# Patient Record
Sex: FEMALE | Race: WHITE | Employment: PART TIME
[De-identification: names, ages, dates, MRNs, and addresses within clinical notes are randomized per-mention and may not be internally consistent; named-entity substitution may affect disease eponyms.]

---

## 2017-01-26 ENCOUNTER — OFFICE VISIT (OUTPATIENT)
Dept: OBGYN | Facility: CLINIC | Age: 37
End: 2017-01-26

## 2017-01-26 VITALS
SYSTOLIC BLOOD PRESSURE: 135 MMHG | WEIGHT: 142 LBS | HEIGHT: 64 IN | HEART RATE: 90 BPM | BODY MASS INDEX: 24.24 KG/M2 | DIASTOLIC BLOOD PRESSURE: 87 MMHG

## 2017-01-26 DIAGNOSIS — N91.2 AMENORRHEA: Primary | ICD-10-CM

## 2017-01-26 DIAGNOSIS — Z32.01 POSITIVE PREGNANCY TEST: ICD-10-CM

## 2017-01-26 LAB
CONTROL: ABNORMAL
PREGNANCY TEST URINE, POC: ABNORMAL

## 2017-01-26 PROCEDURE — 81025 URINE PREGNANCY TEST: CPT | Performed by: SPECIALIST

## 2017-01-26 PROCEDURE — 99213 OFFICE O/P EST LOW 20 MIN: CPT | Performed by: SPECIALIST

## 2017-01-26 RX ORDER — PROMETHAZINE HYDROCHLORIDE 25 MG/1
25 TABLET ORAL EVERY 6 HOURS PRN
Qty: 30 TABLET | Refills: 1 | Status: ON HOLD | OUTPATIENT
Start: 2017-01-26 | End: 2017-07-27 | Stop reason: HOSPADM

## 2017-01-26 RX ORDER — VITAMIN A ACETATE, .BETA.-CAROTENE, ASCORBIC ACID, CHOLECALCIFEROL, .ALPHA.-TOCOPHEROL ACETATE, DL-, THIAMINE MONONITRATE, RIBOFLAVIN, NIACINAMIDE, PYRIDOXINE HYDROCHLORIDE, FOLIC ACID, CYANOCOBALAMIN, CALCIUM CARBONATE, FERROUS FUMARATE, ZINC OXIDE, AND CUPRIC OXIDE 2000; 2000; 120; 400; 22; 1.84; 3; 20; 10; 1; 12; 200; 27; 25; 2 [IU]/1; [IU]/1; MG/1; [IU]/1; MG/1; MG/1; MG/1; MG/1; MG/1; MG/1; UG/1; MG/1; MG/1; MG/1; MG/1
1 TABLET ORAL DAILY
Qty: 30 TABLET | Refills: 11 | Status: SHIPPED | OUTPATIENT
Start: 2017-01-26 | End: 2018-09-05

## 2017-02-03 ENCOUNTER — PROCEDURE VISIT (OUTPATIENT)
Dept: OBGYN | Facility: CLINIC | Age: 37
End: 2017-02-03

## 2017-02-03 DIAGNOSIS — Z3A.14 14 WEEKS GESTATION OF PREGNANCY: ICD-10-CM

## 2017-02-03 DIAGNOSIS — O36.80X0 ENCOUNTER TO DETERMINE FETAL VIABILITY OF PREGNANCY, NOT APPLICABLE OR UNSPECIFIED FETUS: Primary | ICD-10-CM

## 2017-02-03 PROCEDURE — 76805 OB US >/= 14 WKS SNGL FETUS: CPT | Performed by: SPECIALIST

## 2017-02-03 ASSESSMENT — ENCOUNTER SYMPTOMS
APNEA: 0
ABDOMINAL DISTENTION: 0
CONSTIPATION: 0
ABDOMINAL PAIN: 0
VOMITING: 1
NAUSEA: 0
COUGH: 0
DIARRHEA: 0
EYE PAIN: 0

## 2017-02-21 ENCOUNTER — INITIAL PRENATAL (OUTPATIENT)
Dept: OBGYN | Facility: CLINIC | Age: 37
End: 2017-02-21

## 2017-02-21 ENCOUNTER — HOSPITAL ENCOUNTER (OUTPATIENT)
Age: 37
Setting detail: SPECIMEN
Discharge: HOME | End: 2017-02-21
Payer: COMMERCIAL

## 2017-02-21 VITALS
BODY MASS INDEX: 25.23 KG/M2 | WEIGHT: 147 LBS | DIASTOLIC BLOOD PRESSURE: 86 MMHG | HEART RATE: 94 BPM | SYSTOLIC BLOOD PRESSURE: 124 MMHG

## 2017-02-21 DIAGNOSIS — Z3A.16 16 WEEKS GESTATION OF PREGNANCY: ICD-10-CM

## 2017-02-21 DIAGNOSIS — Z34.80 SUPERVISION OF OTHER NORMAL PREGNANCY, ANTEPARTUM: Primary | ICD-10-CM

## 2017-02-21 PROCEDURE — 0502F SUBSEQUENT PRENATAL CARE: CPT | Performed by: SPECIALIST

## 2017-02-22 LAB
-: NORMAL
ABO/RH: NORMAL
ABSOLUTE EOS #: 0.1 K/UL (ref 0–0.4)
ABSOLUTE LYMPH #: 1.9 K/UL (ref 1–4.8)
ABSOLUTE MONO #: 0.4 K/UL (ref 0.1–1.2)
AMORPHOUS: NORMAL
AMPHETAMINE SCREEN URINE: NEGATIVE
ANTIBODY SCREEN: NEGATIVE
BACTERIA: NORMAL
BARBITURATE SCREEN URINE: NEGATIVE
BASOPHILS # BLD: 1 % (ref 0–2)
BASOPHILS ABSOLUTE: 0 K/UL (ref 0–0.2)
BENZODIAZEPINE SCREEN, URINE: NEGATIVE
BILIRUBIN URINE: NEGATIVE
BUPRENORPHINE URINE: ABNORMAL
CANNABINOID SCREEN URINE: POSITIVE
CASTS UA: NORMAL /LPF (ref 0–8)
COCAINE METABOLITE, URINE: NEGATIVE
COLOR: YELLOW
COMMENT UA: ABNORMAL
CRYSTALS, UA: NORMAL /HPF
DIFFERENTIAL TYPE: ABNORMAL
EOSINOPHILS RELATIVE PERCENT: 1 % (ref 1–4)
EPITHELIAL CELLS UA: NORMAL /HPF (ref 0–5)
GLUCOSE URINE: NEGATIVE
HCT VFR BLD CALC: 33.9 % (ref 36–46)
HEMOGLOBIN: 11.6 G/DL (ref 12–16)
HEPATITIS B SURFACE ANTIGEN: NONREACTIVE
HIV AG/AB: NONREACTIVE
KETONES, URINE: NEGATIVE
LEUKOCYTE ESTERASE, URINE: NEGATIVE
LYMPHOCYTES # BLD: 25 % (ref 24–44)
MCH RBC QN AUTO: 29.9 PG (ref 26–34)
MCHC RBC AUTO-ENTMCNC: 34.2 G/DL (ref 31–37)
MCV RBC AUTO: 87.5 FL (ref 80–100)
MDMA URINE: ABNORMAL
METHADONE SCREEN, URINE: NEGATIVE
METHAMPHETAMINE, URINE: ABNORMAL
MONOCYTES # BLD: 5 % (ref 2–11)
MUCUS: NORMAL
NITRITE, URINE: NEGATIVE
OPIATES, URINE: NEGATIVE
OTHER OBSERVATIONS UA: NORMAL
OXYCODONE SCREEN URINE: NEGATIVE
PDW BLD-RTO: 14.1 % (ref 12.5–15.4)
PH UA: 7 (ref 5–8)
PHENCYCLIDINE, URINE: NEGATIVE
PLATELET # BLD: 264 K/UL (ref 140–450)
PLATELET ESTIMATE: ABNORMAL
PMV BLD AUTO: 8.8 FL (ref 6–12)
PROPOXYPHENE, URINE: ABNORMAL
PROTEIN UA: NEGATIVE
RBC # BLD: 3.88 M/UL (ref 4–5.2)
RBC # BLD: ABNORMAL 10*6/UL
RBC UA: NORMAL /HPF (ref 0–4)
RENAL EPITHELIAL, UA: NORMAL /HPF
RUBV IGG SER QL: 10.8 IU/ML
SEG NEUTROPHILS: 68 % (ref 36–66)
SEGMENTED NEUTROPHILS ABSOLUTE COUNT: 5.2 K/UL (ref 1.8–7.7)
SICKLE CELL SCREEN: NEGATIVE
SPECIFIC GRAVITY UA: 1.02 (ref 1–1.03)
T. PALLIDUM, IGG: NONREACTIVE
TEST INFORMATION: ABNORMAL
TRICHOMONAS: NORMAL
TRICYCLIC ANTIDEPRESSANTS, UR: ABNORMAL
TSH SERPL DL<=0.05 MIU/L-ACNC: 3.05 MIU/L (ref 0.3–5)
TURBIDITY: ABNORMAL
URINE HGB: NEGATIVE
UROBILINOGEN, URINE: NORMAL
WBC # BLD: 7.6 K/UL (ref 3.5–11)
WBC # BLD: ABNORMAL 10*3/UL
WBC UA: NORMAL /HPF (ref 0–5)
YEAST: NORMAL

## 2017-03-01 LAB — CYSTIC FIBROSIS: NORMAL

## 2017-03-16 ENCOUNTER — HOSPITAL ENCOUNTER (OUTPATIENT)
Age: 37
Setting detail: SPECIMEN
Discharge: HOME | End: 2017-03-16

## 2017-03-16 ENCOUNTER — ROUTINE PRENATAL (OUTPATIENT)
Dept: OBGYN CLINIC | Age: 37
End: 2017-03-16

## 2017-03-16 VITALS
BODY MASS INDEX: 25.92 KG/M2 | WEIGHT: 151 LBS | HEART RATE: 98 BPM | DIASTOLIC BLOOD PRESSURE: 82 MMHG | SYSTOLIC BLOOD PRESSURE: 128 MMHG

## 2017-03-16 DIAGNOSIS — Z34.82 ENCOUNTER FOR SUPERVISION OF OTHER NORMAL PREGNANCY, SECOND TRIMESTER: ICD-10-CM

## 2017-03-16 DIAGNOSIS — N76.0 ACUTE VAGINITIS: ICD-10-CM

## 2017-03-16 DIAGNOSIS — Z12.4 CERVICAL CANCER SCREENING: Primary | ICD-10-CM

## 2017-03-16 PROCEDURE — 0502F SUBSEQUENT PRENATAL CARE: CPT | Performed by: SPECIALIST

## 2017-03-16 RX ORDER — FLUCONAZOLE 100 MG/1
100 TABLET ORAL DAILY
Qty: 7 TABLET | Refills: 0 | Status: SHIPPED | OUTPATIENT
Start: 2017-03-16 | End: 2017-03-23

## 2017-03-21 LAB
CHLAMYDIA BY THIN PREP: NEGATIVE
N. GONORRHOEAE DNA, THIN PREP: NEGATIVE

## 2017-03-22 LAB — CYTOLOGY REPORT: NORMAL

## 2017-03-24 ENCOUNTER — PROCEDURE VISIT (OUTPATIENT)
Dept: OBGYN CLINIC | Age: 37
End: 2017-03-24
Payer: COMMERCIAL

## 2017-03-24 DIAGNOSIS — Z3A.21 21 WEEKS GESTATION OF PREGNANCY: ICD-10-CM

## 2017-03-24 DIAGNOSIS — R93.89 ABNORMAL FINDING ON ULTRASOUND: ICD-10-CM

## 2017-03-24 DIAGNOSIS — Z36.89 ENCOUNTER FOR FETAL ANATOMIC SURVEY: Primary | ICD-10-CM

## 2017-03-24 PROCEDURE — 76805 OB US >/= 14 WKS SNGL FETUS: CPT | Performed by: SPECIALIST

## 2017-04-18 ENCOUNTER — ROUTINE PRENATAL (OUTPATIENT)
Dept: OBGYN CLINIC | Age: 37
End: 2017-04-18

## 2017-04-18 VITALS
BODY MASS INDEX: 26.26 KG/M2 | HEART RATE: 114 BPM | SYSTOLIC BLOOD PRESSURE: 121 MMHG | DIASTOLIC BLOOD PRESSURE: 76 MMHG | WEIGHT: 153 LBS

## 2017-04-18 DIAGNOSIS — Z34.82 ENCOUNTER FOR SUPERVISION OF OTHER NORMAL PREGNANCY, SECOND TRIMESTER: Primary | ICD-10-CM

## 2017-04-18 DIAGNOSIS — Z3A.25 25 WEEKS GESTATION OF PREGNANCY: ICD-10-CM

## 2017-04-18 PROCEDURE — 0502F SUBSEQUENT PRENATAL CARE: CPT | Performed by: SPECIALIST

## 2017-04-25 ENCOUNTER — HOSPITAL ENCOUNTER (OUTPATIENT)
Age: 37
Discharge: HOME | End: 2017-04-25
Payer: COMMERCIAL

## 2017-04-25 ENCOUNTER — ROUTINE PRENATAL (OUTPATIENT)
Dept: PERINATAL CARE | Age: 37
End: 2017-04-25
Payer: COMMERCIAL

## 2017-04-25 VITALS
BODY MASS INDEX: 27.12 KG/M2 | HEART RATE: 100 BPM | TEMPERATURE: 98.3 F | WEIGHT: 158 LBS | SYSTOLIC BLOOD PRESSURE: 130 MMHG | DIASTOLIC BLOOD PRESSURE: 84 MMHG | RESPIRATION RATE: 20 BRPM

## 2017-04-25 DIAGNOSIS — O35.DXX0 ECHOGENIC FOCUS OF BOWEL, FETAL, AFFECTING CARE OF MOTHER, ANTEPARTUM, NOT APPLICABLE OR UNSPECIFIED FETUS: Primary | ICD-10-CM

## 2017-04-25 DIAGNOSIS — Z3A.25 25 WEEKS GESTATION OF PREGNANCY: ICD-10-CM

## 2017-04-25 LAB
TOXOPLASM IGM: 0.15 INDEX
TOXOPLASMA BLOOD FOR RATIO: <0.5 IU/ML

## 2017-04-25 PROCEDURE — 36415 COLL VENOUS BLD VENIPUNCTURE: CPT

## 2017-04-25 PROCEDURE — 99242 OFF/OP CONSLTJ NEW/EST SF 20: CPT | Performed by: OBSTETRICS & GYNECOLOGY

## 2017-04-25 PROCEDURE — 86777 TOXOPLASMA ANTIBODY: CPT

## 2017-04-25 PROCEDURE — 86658 ENTEROVIRUS ANTIBODY: CPT

## 2017-04-25 PROCEDURE — 86778 TOXOPLASMA ANTIBODY IGM: CPT

## 2017-04-25 PROCEDURE — 76811 OB US DETAILED SNGL FETUS: CPT | Performed by: OBSTETRICS & GYNECOLOGY

## 2017-04-25 PROCEDURE — 86747 PARVOVIRUS ANTIBODY: CPT

## 2017-04-25 PROCEDURE — 86644 CMV ANTIBODY: CPT

## 2017-04-25 PROCEDURE — 86645 CMV ANTIBODY IGM: CPT

## 2017-04-26 LAB
CMV IGM: 0.2
CYTOMEGALOVIRUS IGG ANTIBODY: 7
SEND OUT REPORT: NORMAL
TEST NAME: NORMAL

## 2017-04-28 LAB
COXSACKIE A9 AB: NORMAL
PARVOVIRUS B19 IGG ANTIBODY: 6.47 IV
PARVOVIRUS B19 IGM ANTIBODY: 1.24 IV

## 2017-05-02 ENCOUNTER — ROUTINE PRENATAL (OUTPATIENT)
Dept: OBGYN CLINIC | Age: 37
End: 2017-05-02

## 2017-05-02 VITALS
WEIGHT: 159 LBS | BODY MASS INDEX: 27.29 KG/M2 | SYSTOLIC BLOOD PRESSURE: 133 MMHG | HEART RATE: 120 BPM | DIASTOLIC BLOOD PRESSURE: 82 MMHG

## 2017-05-02 DIAGNOSIS — O36.0930: ICD-10-CM

## 2017-05-02 DIAGNOSIS — Z3A.26 26 WEEKS GESTATION OF PREGNANCY: ICD-10-CM

## 2017-05-02 DIAGNOSIS — Z34.83 ENCOUNTER FOR SUPERVISION OF OTHER NORMAL PREGNANCY, THIRD TRIMESTER: Primary | ICD-10-CM

## 2017-05-02 LAB
COXSACKIE B1 ANTIBODY: NORMAL
COXSACKIE B2 ANTIBODY: NORMAL
COXSACKIE B3 ANTIBODY: NORMAL
COXSACKIE B4 ANTIBODY: NORMAL
COXSACKIE B5 ANTIBODY: NORMAL
COXSACKIE B6 ANTIBODY: NORMAL

## 2017-05-02 PROCEDURE — 0502F SUBSEQUENT PRENATAL CARE: CPT | Performed by: SPECIALIST

## 2017-05-12 ENCOUNTER — HOSPITAL ENCOUNTER (OUTPATIENT)
Dept: LABOR AND DELIVERY | Age: 37
Discharge: HOME | End: 2017-05-12
Payer: COMMERCIAL

## 2017-05-12 ENCOUNTER — HOSPITAL ENCOUNTER (OUTPATIENT)
Age: 37
Discharge: HOME | End: 2017-05-12
Payer: COMMERCIAL

## 2017-05-12 ENCOUNTER — TELEPHONE (OUTPATIENT)
Dept: OBGYN CLINIC | Age: 37
End: 2017-05-12

## 2017-05-12 DIAGNOSIS — T80.40XA: Primary | ICD-10-CM

## 2017-05-12 DIAGNOSIS — Z3A.26 26 WEEKS GESTATION OF PREGNANCY: ICD-10-CM

## 2017-05-12 DIAGNOSIS — Z34.83 ENCOUNTER FOR SUPERVISION OF OTHER NORMAL PREGNANCY, THIRD TRIMESTER: ICD-10-CM

## 2017-05-12 DIAGNOSIS — O36.0930: ICD-10-CM

## 2017-05-12 LAB
ABSOLUTE EOS #: 0.1 K/UL (ref 0–0.4)
ABSOLUTE LYMPH #: 1.4 K/UL (ref 1–4.8)
ABSOLUTE MONO #: 0.4 K/UL (ref 0.1–1.3)
BASOPHILS # BLD: 0 %
BASOPHILS ABSOLUTE: 0 K/UL (ref 0–0.2)
DIFFERENTIAL TYPE: ABNORMAL
EOSINOPHILS RELATIVE PERCENT: 1 %
GLUCOSE ADMINISTRATION: ABNORMAL
GLUCOSE TOLERANCE SCREEN 50G: 190 MG/DL (ref 70–135)
HCT VFR BLD CALC: 33.2 % (ref 36–46)
HEMOGLOBIN: 11 G/DL (ref 12–16)
LYMPHOCYTES # BLD: 16 %
MCH RBC QN AUTO: 28.7 PG (ref 26–34)
MCHC RBC AUTO-ENTMCNC: 33.3 G/DL (ref 31–37)
MCV RBC AUTO: 86.3 FL (ref 80–100)
MONOCYTES # BLD: 4 %
PDW BLD-RTO: 12.8 % (ref 11.5–14.9)
PLATELET # BLD: 282 K/UL (ref 150–450)
PLATELET ESTIMATE: ABNORMAL
PMV BLD AUTO: 7.9 FL (ref 6–12)
RBC # BLD: 3.84 M/UL (ref 4–5.2)
RBC # BLD: ABNORMAL 10*6/UL
SEG NEUTROPHILS: 79 %
SEGMENTED NEUTROPHILS ABSOLUTE COUNT: 7 K/UL (ref 1.3–9.1)
WBC # BLD: 8.9 K/UL (ref 3.5–11)
WBC # BLD: ABNORMAL 10*3/UL

## 2017-05-12 PROCEDURE — 86850 RBC ANTIBODY SCREEN: CPT

## 2017-05-12 PROCEDURE — 86900 BLOOD TYPING SEROLOGIC ABO: CPT

## 2017-05-12 PROCEDURE — 6360000002 HC RX W HCPCS: Performed by: SPECIALIST

## 2017-05-12 PROCEDURE — 36415 COLL VENOUS BLD VENIPUNCTURE: CPT

## 2017-05-12 PROCEDURE — 86901 BLOOD TYPING SEROLOGIC RH(D): CPT

## 2017-05-12 PROCEDURE — 85025 COMPLETE CBC W/AUTO DIFF WBC: CPT

## 2017-05-12 PROCEDURE — 96372 THER/PROPH/DIAG INJ SC/IM: CPT

## 2017-05-12 PROCEDURE — 82950 GLUCOSE TEST: CPT

## 2017-05-12 RX ADMIN — HUMAN RHO(D) IMMUNE GLOBULIN 300 MCG: 1500 SOLUTION INTRAMUSCULAR; INTRAVENOUS at 11:47

## 2017-05-15 ENCOUNTER — TELEPHONE (OUTPATIENT)
Dept: OBGYN CLINIC | Age: 37
End: 2017-05-15

## 2017-05-15 DIAGNOSIS — O24.410 DIET CONTROLLED GESTATIONAL DIABETES MELLITUS (GDM) IN THIRD TRIMESTER: Primary | ICD-10-CM

## 2017-05-15 LAB
ABO/RH: NORMAL
ANTIBODY SCREEN: NEGATIVE
BLD PROD TYP BPU: NORMAL
BLD PROD TYP BPU: NORMAL
DISPENSE STATUS BLOOD BANK: NORMAL
DU ANTIGEN: NEGATIVE
HISTORY CHECK: NORMAL
Lab: 1
TRANSFUSION STATUS: NORMAL
UNIT DIVISION: 0
UNIT NUMBER: NORMAL

## 2017-05-18 ENCOUNTER — ROUTINE PRENATAL (OUTPATIENT)
Dept: OBGYN CLINIC | Age: 37
End: 2017-05-18

## 2017-05-18 VITALS
HEART RATE: 111 BPM | WEIGHT: 161 LBS | SYSTOLIC BLOOD PRESSURE: 128 MMHG | BODY MASS INDEX: 27.64 KG/M2 | DIASTOLIC BLOOD PRESSURE: 81 MMHG

## 2017-05-18 DIAGNOSIS — O35.DXX0 ECHOGENIC FOCUS OF BOWEL, FETAL, AFFECTING CARE OF MOTHER, ANTEPARTUM, NOT APPLICABLE OR UNSPECIFIED FETUS: ICD-10-CM

## 2017-05-18 DIAGNOSIS — O24.410 DIET CONTROLLED GESTATIONAL DIABETES MELLITUS (GDM) IN THIRD TRIMESTER: Primary | ICD-10-CM

## 2017-05-18 DIAGNOSIS — Z3A.29 29 WEEKS GESTATION OF PREGNANCY: ICD-10-CM

## 2017-05-18 DIAGNOSIS — O09.93 HRP (HIGH RISK PREGNANCY), THIRD TRIMESTER: ICD-10-CM

## 2017-05-18 PROCEDURE — 0502F SUBSEQUENT PRENATAL CARE: CPT | Performed by: SPECIALIST

## 2017-05-26 ENCOUNTER — ROUTINE PRENATAL (OUTPATIENT)
Dept: OBGYN CLINIC | Age: 37
End: 2017-05-26

## 2017-05-26 VITALS
BODY MASS INDEX: 27.64 KG/M2 | DIASTOLIC BLOOD PRESSURE: 80 MMHG | WEIGHT: 161 LBS | HEART RATE: 101 BPM | SYSTOLIC BLOOD PRESSURE: 126 MMHG

## 2017-05-26 DIAGNOSIS — Z3A.30 30 WEEKS GESTATION OF PREGNANCY: Primary | ICD-10-CM

## 2017-05-26 DIAGNOSIS — Z34.83 PRENATAL CARE, SUBSEQUENT PREGNANCY, THIRD TRIMESTER: ICD-10-CM

## 2017-05-26 PROCEDURE — 0502F SUBSEQUENT PRENATAL CARE: CPT | Performed by: SPECIALIST

## 2017-05-26 RX ORDER — BLOOD SUGAR DIAGNOSTIC
STRIP MISCELLANEOUS
COMMUNITY
Start: 2017-05-25 | End: 2017-08-09

## 2017-05-26 RX ORDER — LANCETS
EACH MISCELLANEOUS
COMMUNITY
Start: 2017-05-25 | End: 2017-08-09

## 2017-05-28 PROBLEM — Z34.80 PRENATAL CARE, SUBSEQUENT PREGNANCY: Status: ACTIVE | Noted: 2017-05-28

## 2017-06-02 ENCOUNTER — ROUTINE PRENATAL (OUTPATIENT)
Dept: OBGYN CLINIC | Age: 37
End: 2017-06-02

## 2017-06-02 VITALS
DIASTOLIC BLOOD PRESSURE: 92 MMHG | HEART RATE: 112 BPM | BODY MASS INDEX: 27.12 KG/M2 | WEIGHT: 158 LBS | SYSTOLIC BLOOD PRESSURE: 132 MMHG

## 2017-06-02 DIAGNOSIS — Z34.83 PRENATAL CARE, SUBSEQUENT PREGNANCY, THIRD TRIMESTER: ICD-10-CM

## 2017-06-02 DIAGNOSIS — Z3A.31 31 WEEKS GESTATION OF PREGNANCY: Primary | ICD-10-CM

## 2017-06-02 PROCEDURE — 0502F SUBSEQUENT PRENATAL CARE: CPT | Performed by: SPECIALIST

## 2017-06-02 RX ORDER — FAMOTIDINE 20 MG/1
20 TABLET, FILM COATED ORAL 2 TIMES DAILY
Qty: 60 TABLET | Refills: 3 | Status: SHIPPED | OUTPATIENT
Start: 2017-06-02 | End: 2017-08-09

## 2017-06-09 ENCOUNTER — PROCEDURE VISIT (OUTPATIENT)
Dept: OBGYN CLINIC | Age: 37
End: 2017-06-09
Payer: COMMERCIAL

## 2017-06-09 DIAGNOSIS — Z3A.32 32 WEEKS GESTATION OF PREGNANCY: ICD-10-CM

## 2017-06-09 DIAGNOSIS — O24.419 GESTATIONAL DIABETES MELLITUS (GDM) AFFECTING PREGNANCY: Primary | ICD-10-CM

## 2017-06-09 LAB
ABDOMINAL CIRCUMFERENCE: 27.57 CM
BIPARIETAL DIAMETER: 7.62 CM
ESTIMATED FETAL WEIGHT: 1869 GRAMS
FEMORAL DIAMETER: NORMAL CM
HC/AC: 1.05
HEAD CIRCUMFERENCE: 28.82 CM

## 2017-06-09 PROCEDURE — 76818 FETAL BIOPHYS PROFILE W/NST: CPT | Performed by: SPECIALIST

## 2017-06-13 ENCOUNTER — ROUTINE PRENATAL (OUTPATIENT)
Dept: OBGYN CLINIC | Age: 37
End: 2017-06-13
Payer: COMMERCIAL

## 2017-06-13 VITALS
WEIGHT: 163 LBS | SYSTOLIC BLOOD PRESSURE: 125 MMHG | BODY MASS INDEX: 27.98 KG/M2 | DIASTOLIC BLOOD PRESSURE: 85 MMHG | HEART RATE: 100 BPM

## 2017-06-13 DIAGNOSIS — O24.410 DIET CONTROLLED GESTATIONAL DIABETES MELLITUS (GDM) IN THIRD TRIMESTER: ICD-10-CM

## 2017-06-13 DIAGNOSIS — Z3A.32 32 WEEKS GESTATION OF PREGNANCY: ICD-10-CM

## 2017-06-13 DIAGNOSIS — O09.93 HRP (HIGH RISK PREGNANCY), THIRD TRIMESTER: Primary | ICD-10-CM

## 2017-06-13 DIAGNOSIS — Z34.83 PRENATAL CARE, SUBSEQUENT PREGNANCY, THIRD TRIMESTER: ICD-10-CM

## 2017-06-13 LAB
ACCELERATIONS > 10BPM: NORMAL
ACCELERATIONS > 15 BPM: NORMAL
ACOUSTIC STIMULATION: NORMAL
DECELERATIONS: NORMAL
FHR VARIABILITIES: NORMAL
NST ASSESSMENT: NORMAL
NST BASELINE: NORMAL
NST DURATION: NORMAL MINUTES
NST INDICATIONS: NORMAL
NST LOCATIONS: NORMAL
NST READ BY: NORMAL
NST RETURN: NORMAL
UTERINE ACTIVITY: NORMAL

## 2017-06-13 PROCEDURE — 99213 OFFICE O/P EST LOW 20 MIN: CPT | Performed by: SPECIALIST

## 2017-06-13 PROCEDURE — 59025 FETAL NON-STRESS TEST: CPT | Performed by: SPECIALIST

## 2017-06-14 ENCOUNTER — HOSPITAL ENCOUNTER (EMERGENCY)
Facility: CLINIC | Age: 37
Discharge: OTHER INSTITUTION | End: 2017-06-14
Attending: EMERGENCY MEDICINE
Payer: COMMERCIAL

## 2017-06-14 ENCOUNTER — HOSPITAL ENCOUNTER (OUTPATIENT)
Age: 37
Discharge: HOME | End: 2017-06-14
Attending: SPECIALIST | Admitting: SPECIALIST
Payer: COMMERCIAL

## 2017-06-14 VITALS
BODY MASS INDEX: 30 KG/M2 | DIASTOLIC BLOOD PRESSURE: 87 MMHG | HEART RATE: 103 BPM | SYSTOLIC BLOOD PRESSURE: 124 MMHG | OXYGEN SATURATION: 97 % | HEIGHT: 62 IN | WEIGHT: 163 LBS | TEMPERATURE: 98.4 F | RESPIRATION RATE: 16 BRPM

## 2017-06-14 VITALS
SYSTOLIC BLOOD PRESSURE: 125 MMHG | DIASTOLIC BLOOD PRESSURE: 78 MMHG | TEMPERATURE: 98.3 F | RESPIRATION RATE: 18 BRPM | HEART RATE: 89 BPM

## 2017-06-14 DIAGNOSIS — R10.9 ABDOMINAL CRAMPING AFFECTING PREGNANCY: Primary | ICD-10-CM

## 2017-06-14 DIAGNOSIS — O26.899 ABDOMINAL CRAMPING AFFECTING PREGNANCY: Primary | ICD-10-CM

## 2017-06-14 LAB
ABSOLUTE EOS #: 0 K/UL (ref 0–0.4)
ABSOLUTE LYMPH #: 1.5 K/UL (ref 1–4.8)
ABSOLUTE MONO #: 0.4 K/UL (ref 0.1–1.2)
ANION GAP SERPL CALCULATED.3IONS-SCNC: 15 MMOL/L (ref 9–17)
BASOPHILS # BLD: 0 %
BASOPHILS ABSOLUTE: 0 K/UL (ref 0–0.2)
BILIRUBIN URINE: NEGATIVE
BUN BLDV-MCNC: 8 MG/DL (ref 6–20)
BUN/CREAT BLD: ABNORMAL (ref 9–20)
CALCIUM SERPL-MCNC: 8.2 MG/DL (ref 8.6–10.4)
CHLORIDE BLD-SCNC: 102 MMOL/L (ref 98–107)
CO2: 20 MMOL/L (ref 20–31)
COLOR: YELLOW
COMMENT UA: NORMAL
CREAT SERPL-MCNC: 0.6 MG/DL (ref 0.5–0.9)
DIFFERENTIAL TYPE: ABNORMAL
EOSINOPHILS RELATIVE PERCENT: 0 %
FETAL FIBRONECTIN APPEARANCE: NORMAL
FETAL FIBRONECTIN: NEGATIVE
GFR AFRICAN AMERICAN: >60 ML/MIN
GFR NON-AFRICAN AMERICAN: >60 ML/MIN
GFR SERPL CREATININE-BSD FRML MDRD: ABNORMAL ML/MIN/{1.73_M2}
GFR SERPL CREATININE-BSD FRML MDRD: ABNORMAL ML/MIN/{1.73_M2}
GLUCOSE BLD-MCNC: 98 MG/DL (ref 70–99)
GLUCOSE URINE: NEGATIVE
HCT VFR BLD CALC: 30.9 % (ref 36–46)
HEMOGLOBIN: 10.4 G/DL (ref 12–16)
KETONES, URINE: NEGATIVE
LEUKOCYTE ESTERASE, URINE: NEGATIVE
LYMPHOCYTES # BLD: 16 %
MCH RBC QN AUTO: 28.6 PG (ref 26–34)
MCHC RBC AUTO-ENTMCNC: 33.6 G/DL (ref 31–37)
MCV RBC AUTO: 85.2 FL (ref 80–100)
MONOCYTES # BLD: 4 %
NITRITE, URINE: NEGATIVE
PDW BLD-RTO: 12.8 % (ref 12.5–15.4)
PH UA: 6 (ref 5–8)
PLATELET # BLD: 273 K/UL (ref 140–450)
PLATELET ESTIMATE: ABNORMAL
PMV BLD AUTO: 7.9 FL (ref 6–12)
POTASSIUM SERPL-SCNC: 3.9 MMOL/L (ref 3.7–5.3)
PROTEIN UA: NEGATIVE
RBC # BLD: 3.63 M/UL (ref 4–5.2)
RBC # BLD: ABNORMAL 10*6/UL
SEG NEUTROPHILS: 80 %
SEGMENTED NEUTROPHILS ABSOLUTE COUNT: 7.7 K/UL (ref 1.8–7.7)
SODIUM BLD-SCNC: 137 MMOL/L (ref 135–144)
SPECIFIC GRAVITY UA: 1.02 (ref 1–1.03)
TURBIDITY: CLEAR
URINE HGB: NEGATIVE
UROBILINOGEN, URINE: NORMAL
WBC # BLD: 9.7 K/UL (ref 3.5–11)
WBC # BLD: ABNORMAL 10*3/UL

## 2017-06-14 PROCEDURE — 85025 COMPLETE CBC W/AUTO DIFF WBC: CPT

## 2017-06-14 PROCEDURE — 99284 EMERGENCY DEPT VISIT MOD MDM: CPT

## 2017-06-14 PROCEDURE — G0463 HOSPITAL OUTPT CLINIC VISIT: HCPCS

## 2017-06-14 PROCEDURE — 99213 OFFICE O/P EST LOW 20 MIN: CPT

## 2017-06-14 PROCEDURE — 80048 BASIC METABOLIC PNL TOTAL CA: CPT

## 2017-06-14 PROCEDURE — 82731 ASSAY OF FETAL FIBRONECTIN: CPT

## 2017-06-14 PROCEDURE — 36415 COLL VENOUS BLD VENIPUNCTURE: CPT

## 2017-06-14 ASSESSMENT — PAIN DESCRIPTION - ORIENTATION: ORIENTATION: RIGHT;LEFT;LOWER

## 2017-06-14 ASSESSMENT — PAIN DESCRIPTION - FREQUENCY: FREQUENCY: CONTINUOUS

## 2017-06-14 ASSESSMENT — ENCOUNTER SYMPTOMS
ABDOMINAL PAIN: 1
VOMITING: 0
DIARRHEA: 0

## 2017-06-14 ASSESSMENT — PAIN DESCRIPTION - DESCRIPTORS: DESCRIPTORS: ACHING

## 2017-06-14 ASSESSMENT — PAIN DESCRIPTION - LOCATION: LOCATION: ABDOMEN

## 2017-06-14 ASSESSMENT — PAIN DESCRIPTION - PAIN TYPE: TYPE: ACUTE PAIN

## 2017-06-14 ASSESSMENT — PAIN DESCRIPTION - PROGRESSION: CLINICAL_PROGRESSION: NOT CHANGED

## 2017-06-14 ASSESSMENT — PAIN SCALES - GENERAL: PAINLEVEL_OUTOF10: 3

## 2017-06-23 ENCOUNTER — PROCEDURE VISIT (OUTPATIENT)
Dept: OBGYN CLINIC | Age: 37
End: 2017-06-23
Payer: COMMERCIAL

## 2017-06-23 DIAGNOSIS — Z3A.32 32 WEEKS GESTATION OF PREGNANCY: ICD-10-CM

## 2017-06-23 DIAGNOSIS — O24.419 GESTATIONAL DIABETES MELLITUS (GDM) AFFECTING PREGNANCY: ICD-10-CM

## 2017-06-23 DIAGNOSIS — Z3A.34 34 WEEKS GESTATION OF PREGNANCY: Primary | ICD-10-CM

## 2017-06-23 PROCEDURE — 76818 FETAL BIOPHYS PROFILE W/NST: CPT | Performed by: SPECIALIST

## 2017-06-27 ENCOUNTER — ROUTINE PRENATAL (OUTPATIENT)
Dept: OBGYN CLINIC | Age: 37
End: 2017-06-27
Payer: COMMERCIAL

## 2017-06-27 VITALS
WEIGHT: 162.6 LBS | HEART RATE: 103 BPM | BODY MASS INDEX: 29.74 KG/M2 | SYSTOLIC BLOOD PRESSURE: 115 MMHG | DIASTOLIC BLOOD PRESSURE: 75 MMHG

## 2017-06-27 DIAGNOSIS — Z3A.34 34 WEEKS GESTATION OF PREGNANCY: ICD-10-CM

## 2017-06-27 DIAGNOSIS — O09.93 HIGH-RISK PREGNANCY SUPERVISION, THIRD TRIMESTER: Primary | ICD-10-CM

## 2017-06-27 DIAGNOSIS — O24.410 DIET CONTROLLED GESTATIONAL DIABETES MELLITUS (GDM) IN THIRD TRIMESTER: ICD-10-CM

## 2017-06-27 LAB
ACCELERATIONS > 10BPM: NORMAL
ACCELERATIONS > 15 BPM: NORMAL
ACOUSTIC STIMULATION: NORMAL
BILIRUBIN, POC: NORMAL
BLOOD URINE, POC: NORMAL
CLARITY, POC: CLEAR
COLOR, POC: YELLOW
DECELERATIONS: NORMAL
FHR VARIABILITIES: NORMAL
GLUCOSE URINE, POC: NORMAL
KETONES, POC: NORMAL
LEUKOCYTE EST, POC: NORMAL
NITRITE, POC: NORMAL
NST ASSESSMENT: NORMAL
NST BASELINE: NORMAL
NST DURATION: NORMAL MINUTES
NST INDICATIONS: NORMAL
NST LOCATIONS: NORMAL
NST READ BY: NORMAL
NST RETURN: NORMAL
PH, POC: NORMAL
PROTEIN, POC: NORMAL
SPECIFIC GRAVITY, POC: NORMAL
UROBILINOGEN, POC: NORMAL
UTERINE ACTIVITY: NORMAL

## 2017-06-27 PROCEDURE — 0502F SUBSEQUENT PRENATAL CARE: CPT | Performed by: SPECIALIST

## 2017-06-27 PROCEDURE — 59025 FETAL NON-STRESS TEST: CPT | Performed by: SPECIALIST

## 2017-06-27 PROCEDURE — 81002 URINALYSIS NONAUTO W/O SCOPE: CPT | Performed by: SPECIALIST

## 2017-06-30 ENCOUNTER — PROCEDURE VISIT (OUTPATIENT)
Dept: OBGYN CLINIC | Age: 37
End: 2017-06-30
Payer: COMMERCIAL

## 2017-06-30 DIAGNOSIS — Z3A.32 32 WEEKS GESTATION OF PREGNANCY: ICD-10-CM

## 2017-06-30 DIAGNOSIS — O24.419 GESTATIONAL DIABETES MELLITUS (GDM) AFFECTING PREGNANCY: Primary | ICD-10-CM

## 2017-06-30 DIAGNOSIS — Z3A.35 35 WEEKS GESTATION OF PREGNANCY: ICD-10-CM

## 2017-06-30 LAB
ABDOMINAL CIRCUMFERENCE: NORMAL CM
BIPARIETAL DIAMETER: NORMAL CM
ESTIMATED FETAL WEIGHT: NORMAL GRAMS
FEMORAL DIAMETER: NORMAL CM
HC/AC: NORMAL
HEAD CIRCUMFERENCE: NORMAL CM

## 2017-06-30 PROCEDURE — 76816 OB US FOLLOW-UP PER FETUS: CPT | Performed by: SPECIALIST

## 2017-06-30 PROCEDURE — 76818 FETAL BIOPHYS PROFILE W/NST: CPT | Performed by: SPECIALIST

## 2017-07-04 ENCOUNTER — HOSPITAL ENCOUNTER (OUTPATIENT)
Dept: LABOR AND DELIVERY | Age: 37
Discharge: HOME | End: 2017-07-04
Payer: COMMERCIAL

## 2017-07-04 ENCOUNTER — HOSPITAL ENCOUNTER (OUTPATIENT)
Age: 37
Discharge: HOME | End: 2017-07-04
Attending: SPECIALIST | Admitting: SPECIALIST
Payer: COMMERCIAL

## 2017-07-04 VITALS — HEART RATE: 109 BPM | SYSTOLIC BLOOD PRESSURE: 114 MMHG | DIASTOLIC BLOOD PRESSURE: 75 MMHG | TEMPERATURE: 99.4 F

## 2017-07-04 PROCEDURE — 59025 FETAL NON-STRESS TEST: CPT

## 2017-07-07 ENCOUNTER — PROCEDURE VISIT (OUTPATIENT)
Dept: OBGYN CLINIC | Age: 37
End: 2017-07-07
Payer: COMMERCIAL

## 2017-07-07 DIAGNOSIS — Z3A.32 32 WEEKS GESTATION OF PREGNANCY: ICD-10-CM

## 2017-07-07 DIAGNOSIS — O24.419 GESTATIONAL DIABETES MELLITUS (GDM) AFFECTING PREGNANCY: ICD-10-CM

## 2017-07-07 DIAGNOSIS — Z3A.36 36 WEEKS GESTATION OF PREGNANCY: Primary | ICD-10-CM

## 2017-07-07 PROCEDURE — 76818 FETAL BIOPHYS PROFILE W/NST: CPT | Performed by: SPECIALIST

## 2017-07-11 ENCOUNTER — HOSPITAL ENCOUNTER (OUTPATIENT)
Age: 37
Setting detail: SPECIMEN
Discharge: HOME | End: 2017-07-11
Payer: COMMERCIAL

## 2017-07-11 ENCOUNTER — ROUTINE PRENATAL (OUTPATIENT)
Dept: OBGYN CLINIC | Age: 37
End: 2017-07-11
Payer: COMMERCIAL

## 2017-07-11 VITALS
WEIGHT: 166 LBS | DIASTOLIC BLOOD PRESSURE: 82 MMHG | SYSTOLIC BLOOD PRESSURE: 122 MMHG | BODY MASS INDEX: 30.36 KG/M2 | HEART RATE: 103 BPM

## 2017-07-11 DIAGNOSIS — O09.93 HRP (HIGH RISK PREGNANCY), THIRD TRIMESTER: Primary | ICD-10-CM

## 2017-07-11 DIAGNOSIS — O24.410 DIET CONTROLLED GESTATIONAL DIABETES MELLITUS (GDM) IN THIRD TRIMESTER: ICD-10-CM

## 2017-07-11 DIAGNOSIS — Z3A.36 36 WEEKS GESTATION OF PREGNANCY: ICD-10-CM

## 2017-07-11 PROBLEM — O09.90 HRP (HIGH RISK PREGNANCY): Status: ACTIVE | Noted: 2017-07-11

## 2017-07-11 PROCEDURE — 0502F SUBSEQUENT PRENATAL CARE: CPT | Performed by: SPECIALIST

## 2017-07-11 PROCEDURE — 59025 FETAL NON-STRESS TEST: CPT | Performed by: SPECIALIST

## 2017-07-14 ENCOUNTER — PROCEDURE VISIT (OUTPATIENT)
Dept: OBGYN CLINIC | Age: 37
End: 2017-07-14
Payer: COMMERCIAL

## 2017-07-14 DIAGNOSIS — Z3A.37 37 WEEKS GESTATION OF PREGNANCY: Primary | ICD-10-CM

## 2017-07-14 DIAGNOSIS — O24.419 GESTATIONAL DIABETES MELLITUS (GDM) AFFECTING PREGNANCY: ICD-10-CM

## 2017-07-14 DIAGNOSIS — Z3A.32 32 WEEKS GESTATION OF PREGNANCY: ICD-10-CM

## 2017-07-14 LAB
CULTURE: NORMAL
CULTURE: NORMAL
Lab: NORMAL
SPECIMEN DESCRIPTION: NORMAL
STATUS: NORMAL

## 2017-07-14 PROCEDURE — 76818 FETAL BIOPHYS PROFILE W/NST: CPT | Performed by: SPECIALIST

## 2017-07-18 ENCOUNTER — ROUTINE PRENATAL (OUTPATIENT)
Dept: OBGYN CLINIC | Age: 37
End: 2017-07-18
Payer: COMMERCIAL

## 2017-07-18 VITALS
HEART RATE: 76 BPM | BODY MASS INDEX: 30.36 KG/M2 | DIASTOLIC BLOOD PRESSURE: 81 MMHG | WEIGHT: 166 LBS | SYSTOLIC BLOOD PRESSURE: 139 MMHG

## 2017-07-18 DIAGNOSIS — O09.93 HRP (HIGH RISK PREGNANCY), THIRD TRIMESTER: Primary | ICD-10-CM

## 2017-07-18 DIAGNOSIS — Z3A.37 37 WEEKS GESTATION OF PREGNANCY: ICD-10-CM

## 2017-07-18 DIAGNOSIS — O35.DXX1 ECHOGENIC FOCUS OF BOWEL, FETAL, AFFECTING CARE OF MOTHER, ANTEPARTUM, FETUS 1: ICD-10-CM

## 2017-07-18 PROCEDURE — 0502F SUBSEQUENT PRENATAL CARE: CPT | Performed by: SPECIALIST

## 2017-07-18 PROCEDURE — 59025 FETAL NON-STRESS TEST: CPT | Performed by: SPECIALIST

## 2017-07-21 ENCOUNTER — PROCEDURE VISIT (OUTPATIENT)
Dept: OBGYN CLINIC | Age: 37
End: 2017-07-21
Payer: COMMERCIAL

## 2017-07-21 DIAGNOSIS — O24.419 GESTATIONAL DIABETES MELLITUS (GDM) AFFECTING PREGNANCY: ICD-10-CM

## 2017-07-21 DIAGNOSIS — Z3A.38 38 WEEKS GESTATION OF PREGNANCY: Primary | ICD-10-CM

## 2017-07-21 DIAGNOSIS — Z3A.32 32 WEEKS GESTATION OF PREGNANCY: ICD-10-CM

## 2017-07-21 PROCEDURE — 76818 FETAL BIOPHYS PROFILE W/NST: CPT | Performed by: SPECIALIST

## 2017-07-25 ENCOUNTER — ROUTINE PRENATAL (OUTPATIENT)
Dept: OBGYN CLINIC | Age: 37
End: 2017-07-25
Payer: COMMERCIAL

## 2017-07-25 VITALS
HEART RATE: 110 BPM | WEIGHT: 168 LBS | DIASTOLIC BLOOD PRESSURE: 84 MMHG | SYSTOLIC BLOOD PRESSURE: 137 MMHG | BODY MASS INDEX: 30.73 KG/M2

## 2017-07-25 DIAGNOSIS — Z3A.38 38 WEEKS GESTATION OF PREGNANCY: ICD-10-CM

## 2017-07-25 DIAGNOSIS — O09.93 HRP (HIGH RISK PREGNANCY), THIRD TRIMESTER: ICD-10-CM

## 2017-07-25 DIAGNOSIS — O24.410 DIET CONTROLLED GESTATIONAL DIABETES MELLITUS (GDM) IN THIRD TRIMESTER: Primary | ICD-10-CM

## 2017-07-25 PROCEDURE — 0502F SUBSEQUENT PRENATAL CARE: CPT | Performed by: SPECIALIST

## 2017-07-25 PROCEDURE — 59025 FETAL NON-STRESS TEST: CPT | Performed by: SPECIALIST

## 2017-07-26 ENCOUNTER — ANESTHESIA EVENT (OUTPATIENT)
Dept: LABOR AND DELIVERY | Age: 37
End: 2017-07-26
Payer: COMMERCIAL

## 2017-07-26 ENCOUNTER — HOSPITAL ENCOUNTER (INPATIENT)
Age: 37
LOS: 2 days | Discharge: HOME | End: 2017-07-28
Attending: SPECIALIST | Admitting: SPECIALIST
Payer: COMMERCIAL

## 2017-07-26 ENCOUNTER — ANESTHESIA (OUTPATIENT)
Dept: LABOR AND DELIVERY | Age: 37
End: 2017-07-26
Payer: COMMERCIAL

## 2017-07-26 ENCOUNTER — HOSPITAL ENCOUNTER (OUTPATIENT)
Dept: LABOR AND DELIVERY | Age: 37
Discharge: HOME | End: 2017-07-26
Payer: COMMERCIAL

## 2017-07-26 PROBLEM — Z34.80 PRENATAL CARE, SUBSEQUENT PREGNANCY: Status: RESOLVED | Noted: 2017-05-28 | Resolved: 2017-07-26

## 2017-07-26 PROBLEM — F12.90 MARIJUANA USE: Status: ACTIVE | Noted: 2017-07-26

## 2017-07-26 LAB
-: ABNORMAL
ABO/RH: NORMAL
ABSOLUTE EOS #: 0 K/UL (ref 0–0.4)
ABSOLUTE LYMPH #: 1.3 K/UL (ref 1–4.8)
ABSOLUTE MONO #: 0.4 K/UL (ref 0.1–1.3)
AMORPHOUS: ABNORMAL
ANTIBODY SCREEN: NEGATIVE
ARM BAND NUMBER: NORMAL
BACTERIA: ABNORMAL
BASOPHILS # BLD: 1 %
BASOPHILS ABSOLUTE: 0 K/UL (ref 0–0.2)
BILIRUBIN URINE: ABNORMAL
CASTS UA: ABNORMAL /LPF
COLOR: ABNORMAL
COMMENT UA: ABNORMAL
CRYSTALS, UA: ABNORMAL /HPF
DIFFERENTIAL TYPE: ABNORMAL
DU ANTIGEN: NEGATIVE
EOSINOPHILS RELATIVE PERCENT: 0 %
EPITHELIAL CELLS UA: ABNORMAL /HPF
EXPIRATION DATE: NORMAL
GLUCOSE BLD-MCNC: 102 MG/DL (ref 65–105)
GLUCOSE BLD-MCNC: 125 MG/DL (ref 65–105)
GLUCOSE URINE: NEGATIVE
HCT VFR BLD CALC: 29.6 % (ref 36–46)
HEMOGLOBIN: 9.8 G/DL (ref 12–16)
KETONES, URINE: NEGATIVE
LEUKOCYTE ESTERASE, URINE: NEGATIVE
LYMPHOCYTES # BLD: 22 %
MCH RBC QN AUTO: 28.2 PG (ref 26–34)
MCHC RBC AUTO-ENTMCNC: 33.2 G/DL (ref 31–37)
MCV RBC AUTO: 84.8 FL (ref 80–100)
MONOCYTES # BLD: 7 %
MUCUS: ABNORMAL
NITRITE, URINE: NEGATIVE
OTHER OBSERVATIONS UA: ABNORMAL
PDW BLD-RTO: 13.5 % (ref 11.5–14.9)
PH UA: 6 (ref 5–8)
PLATELET # BLD: 255 K/UL (ref 150–450)
PLATELET ESTIMATE: ABNORMAL
PMV BLD AUTO: 8.3 FL (ref 6–12)
PROTEIN UA: ABNORMAL
RBC # BLD: 3.49 M/UL (ref 4–5.2)
RBC # BLD: ABNORMAL 10*6/UL
RBC UA: ABNORMAL /HPF
RENAL EPITHELIAL, UA: ABNORMAL /HPF
SEG NEUTROPHILS: 70 %
SEGMENTED NEUTROPHILS ABSOLUTE COUNT: 4.1 K/UL (ref 1.3–9.1)
SPECIFIC GRAVITY UA: 1.03 (ref 1–1.03)
T. PALLIDUM, IGG: NONREACTIVE
TRICHOMONAS: ABNORMAL
TURBIDITY: CLEAR
URINE HGB: ABNORMAL
UROBILINOGEN, URINE: ABNORMAL
WBC # BLD: 5.8 K/UL (ref 3.5–11)
WBC # BLD: ABNORMAL 10*3/UL
WBC UA: ABNORMAL /HPF
YEAST: ABNORMAL

## 2017-07-26 PROCEDURE — 10907ZC DRAINAGE OF AMNIOTIC FLUID, THERAPEUTIC FROM PRODUCTS OF CONCEPTION, VIA NATURAL OR ARTIFICIAL OPENING: ICD-10-PCS | Performed by: OBSTETRICS & GYNECOLOGY

## 2017-07-26 PROCEDURE — 81001 URINALYSIS AUTO W/SCOPE: CPT

## 2017-07-26 PROCEDURE — 88307 TISSUE EXAM BY PATHOLOGIST: CPT

## 2017-07-26 PROCEDURE — 6360000002 HC RX W HCPCS: Performed by: STUDENT IN AN ORGANIZED HEALTH CARE EDUCATION/TRAINING PROGRAM

## 2017-07-26 PROCEDURE — 2580000003 HC RX 258: Performed by: OBSTETRICS & GYNECOLOGY

## 2017-07-26 PROCEDURE — 86900 BLOOD TYPING SEROLOGIC ABO: CPT

## 2017-07-26 PROCEDURE — 3E0P7GC INTRODUCTION OF OTHER THERAPEUTIC SUBSTANCE INTO FEMALE REPRODUCTIVE, VIA NATURAL OR ARTIFICIAL OPENING: ICD-10-PCS | Performed by: OBSTETRICS & GYNECOLOGY

## 2017-07-26 PROCEDURE — 7200000001 HC VAGINAL DELIVERY

## 2017-07-26 PROCEDURE — 2500000003 HC RX 250 WO HCPCS

## 2017-07-26 PROCEDURE — 86850 RBC ANTIBODY SCREEN: CPT

## 2017-07-26 PROCEDURE — 1220000000 HC SEMI PRIVATE OB R&B

## 2017-07-26 PROCEDURE — 36415 COLL VENOUS BLD VENIPUNCTURE: CPT

## 2017-07-26 PROCEDURE — 86901 BLOOD TYPING SEROLOGIC RH(D): CPT

## 2017-07-26 PROCEDURE — 82947 ASSAY GLUCOSE BLOOD QUANT: CPT

## 2017-07-26 PROCEDURE — 6370000000 HC RX 637 (ALT 250 FOR IP): Performed by: OBSTETRICS & GYNECOLOGY

## 2017-07-26 PROCEDURE — 0HQ9XZZ REPAIR PERINEUM SKIN, EXTERNAL APPROACH: ICD-10-PCS | Performed by: SPECIALIST

## 2017-07-26 PROCEDURE — 86780 TREPONEMA PALLIDUM: CPT

## 2017-07-26 PROCEDURE — 3700000025 ANESTHESIA EPIDURAL BLOCK: Performed by: ANESTHESIOLOGY

## 2017-07-26 PROCEDURE — 6360000002 HC RX W HCPCS: Performed by: ANESTHESIOLOGY

## 2017-07-26 PROCEDURE — 85025 COMPLETE CBC W/AUTO DIFF WBC: CPT

## 2017-07-26 PROCEDURE — 6360000002 HC RX W HCPCS: Performed by: OBSTETRICS & GYNECOLOGY

## 2017-07-26 RX ORDER — LANOLIN 100 %
OINTMENT (GRAM) TOPICAL PRN
Status: DISCONTINUED | OUTPATIENT
Start: 2017-07-26 | End: 2017-07-28 | Stop reason: HOSPADM

## 2017-07-26 RX ORDER — SODIUM CHLORIDE 0.9 % (FLUSH) 0.9 %
10 SYRINGE (ML) INJECTION EVERY 12 HOURS SCHEDULED
Status: DISCONTINUED | OUTPATIENT
Start: 2017-07-26 | End: 2017-07-26

## 2017-07-26 RX ORDER — SIMETHICONE 80 MG
80 TABLET,CHEWABLE ORAL EVERY 6 HOURS PRN
Status: DISCONTINUED | OUTPATIENT
Start: 2017-07-26 | End: 2017-07-28 | Stop reason: HOSPADM

## 2017-07-26 RX ORDER — EPHEDRINE SULFATE 50 MG/ML
INJECTION, SOLUTION INTRAVENOUS
Status: DISCONTINUED
Start: 2017-07-26 | End: 2017-07-26 | Stop reason: WASHOUT

## 2017-07-26 RX ORDER — SODIUM CHLORIDE 0.9 % (FLUSH) 0.9 %
10 SYRINGE (ML) INJECTION PRN
Status: DISCONTINUED | OUTPATIENT
Start: 2017-07-26 | End: 2017-07-28 | Stop reason: HOSPADM

## 2017-07-26 RX ORDER — FAMOTIDINE 20 MG/1
20 TABLET, FILM COATED ORAL 2 TIMES DAILY PRN
Status: DISCONTINUED | OUTPATIENT
Start: 2017-07-26 | End: 2017-07-28 | Stop reason: HOSPADM

## 2017-07-26 RX ORDER — SODIUM CHLORIDE 9 MG/ML
INJECTION, SOLUTION INTRAVENOUS CONTINUOUS
Status: DISCONTINUED | OUTPATIENT
Start: 2017-07-26 | End: 2017-07-26

## 2017-07-26 RX ORDER — BISACODYL 10 MG
10 SUPPOSITORY, RECTAL RECTAL DAILY PRN
Status: DISCONTINUED | OUTPATIENT
Start: 2017-07-26 | End: 2017-07-28 | Stop reason: HOSPADM

## 2017-07-26 RX ORDER — SODIUM CHLORIDE 0.9 % (FLUSH) 0.9 %
10 SYRINGE (ML) INJECTION PRN
Status: DISCONTINUED | OUTPATIENT
Start: 2017-07-26 | End: 2017-07-26

## 2017-07-26 RX ORDER — SODIUM CHLORIDE 0.9 % (FLUSH) 0.9 %
10 SYRINGE (ML) INJECTION EVERY 12 HOURS SCHEDULED
Status: DISCONTINUED | OUTPATIENT
Start: 2017-07-26 | End: 2017-07-28 | Stop reason: HOSPADM

## 2017-07-26 RX ORDER — ACETAMINOPHEN 500 MG
1000 TABLET ORAL EVERY 6 HOURS PRN
Status: DISCONTINUED | OUTPATIENT
Start: 2017-07-26 | End: 2017-07-27

## 2017-07-26 RX ORDER — ROPIVACAINE HYDROCHLORIDE 2 MG/ML
INJECTION, SOLUTION EPIDURAL; INFILTRATION; PERINEURAL CONTINUOUS PRN
Status: DISCONTINUED | OUTPATIENT
Start: 2017-07-26 | End: 2017-07-26 | Stop reason: SDUPTHER

## 2017-07-26 RX ORDER — ACETAMINOPHEN 325 MG/1
650 TABLET ORAL EVERY 4 HOURS PRN
Status: DISCONTINUED | OUTPATIENT
Start: 2017-07-26 | End: 2017-07-26

## 2017-07-26 RX ORDER — NALBUPHINE HCL 10 MG/ML
10 AMPUL (ML) INJECTION
Status: COMPLETED | OUTPATIENT
Start: 2017-07-26 | End: 2017-07-26

## 2017-07-26 RX ORDER — DOCUSATE SODIUM 100 MG/1
100 CAPSULE, LIQUID FILLED ORAL 2 TIMES DAILY
Status: DISCONTINUED | OUTPATIENT
Start: 2017-07-26 | End: 2017-07-28 | Stop reason: HOSPADM

## 2017-07-26 RX ORDER — ONDANSETRON 2 MG/ML
4 INJECTION INTRAMUSCULAR; INTRAVENOUS EVERY 6 HOURS PRN
Status: DISCONTINUED | OUTPATIENT
Start: 2017-07-26 | End: 2017-07-26

## 2017-07-26 RX ORDER — IBUPROFEN 800 MG/1
800 TABLET ORAL EVERY 8 HOURS
Status: DISCONTINUED | OUTPATIENT
Start: 2017-07-26 | End: 2017-07-28 | Stop reason: HOSPADM

## 2017-07-26 RX ORDER — ONDANSETRON HYDROCHLORIDE 8 MG/1
8 TABLET, FILM COATED ORAL EVERY 8 HOURS PRN
Status: DISCONTINUED | OUTPATIENT
Start: 2017-07-26 | End: 2017-07-28 | Stop reason: HOSPADM

## 2017-07-26 RX ORDER — FENTANYL CITRATE 50 UG/ML
INJECTION, SOLUTION INTRAMUSCULAR; INTRAVENOUS
Status: DISCONTINUED
Start: 2017-07-26 | End: 2017-07-26 | Stop reason: WASHOUT

## 2017-07-26 RX ADMIN — SODIUM CHLORIDE: 9 INJECTION, SOLUTION INTRAVENOUS at 18:19

## 2017-07-26 RX ADMIN — Medication 100 ML: at 19:20

## 2017-07-26 RX ADMIN — SODIUM CHLORIDE: 9 INJECTION, SOLUTION INTRAVENOUS at 09:58

## 2017-07-26 RX ADMIN — NALBUPHINE HYDROCHLORIDE 10 MG: 10 INJECTION, SOLUTION INTRAMUSCULAR; INTRAVENOUS; SUBCUTANEOUS at 15:36

## 2017-07-26 RX ADMIN — Medication 1 MILLI-UNITS/MIN: at 20:51

## 2017-07-26 RX ADMIN — IBUPROFEN 800 MG: 800 TABLET, FILM COATED ORAL at 22:58

## 2017-07-26 RX ADMIN — ROPIVACAINE HYDROCHLORIDE 7 ML/HR: 2 INJECTION, SOLUTION EPIDURAL; INFILTRATION at 19:16

## 2017-07-26 RX ADMIN — SODIUM CHLORIDE: 9 INJECTION, SOLUTION INTRAVENOUS at 20:53

## 2017-07-26 RX ADMIN — DINOPROSTONE 10 MG: 10 INSERT VAGINAL at 10:31

## 2017-07-26 ASSESSMENT — PAIN SCALES - GENERAL
PAINLEVEL_OUTOF10: 8
PAINLEVEL_OUTOF10: 0
PAINLEVEL_OUTOF10: 3

## 2017-07-27 LAB
GLUCOSE BLD-MCNC: 154 MG/DL (ref 65–105)
HCT VFR BLD CALC: 27.4 % (ref 36–46)
HCT VFR BLD CALC: 29.3 % (ref 36–46)
HEMOGLOBIN: 9.2 G/DL (ref 12–16)
HEMOGLOBIN: 9.7 G/DL (ref 12–16)
MCH RBC QN AUTO: 28 PG (ref 26–34)
MCHC RBC AUTO-ENTMCNC: 33.2 G/DL (ref 31–37)
MCV RBC AUTO: 84.5 FL (ref 80–100)
PDW BLD-RTO: 13.7 % (ref 11.5–14.9)
PLATELET # BLD: 239 K/UL (ref 150–450)
PMV BLD AUTO: 8.6 FL (ref 6–12)
RBC # BLD: 3.47 M/UL (ref 4–5.2)
WBC # BLD: 12.5 K/UL (ref 3.5–11)

## 2017-07-27 PROCEDURE — 36415 COLL VENOUS BLD VENIPUNCTURE: CPT

## 2017-07-27 PROCEDURE — 85014 HEMATOCRIT: CPT

## 2017-07-27 PROCEDURE — 86900 BLOOD TYPING SEROLOGIC ABO: CPT

## 2017-07-27 PROCEDURE — 85461 HEMOGLOBIN FETAL: CPT

## 2017-07-27 PROCEDURE — 86901 BLOOD TYPING SEROLOGIC RH(D): CPT

## 2017-07-27 PROCEDURE — 6360000002 HC RX W HCPCS: Performed by: OBSTETRICS & GYNECOLOGY

## 2017-07-27 PROCEDURE — 2580000003 HC RX 258: Performed by: OBSTETRICS & GYNECOLOGY

## 2017-07-27 PROCEDURE — 86850 RBC ANTIBODY SCREEN: CPT

## 2017-07-27 PROCEDURE — 6370000000 HC RX 637 (ALT 250 FOR IP): Performed by: STUDENT IN AN ORGANIZED HEALTH CARE EDUCATION/TRAINING PROGRAM

## 2017-07-27 PROCEDURE — 1220000000 HC SEMI PRIVATE OB R&B

## 2017-07-27 PROCEDURE — 85027 COMPLETE CBC AUTOMATED: CPT

## 2017-07-27 PROCEDURE — 85018 HEMOGLOBIN: CPT

## 2017-07-27 PROCEDURE — 6370000000 HC RX 637 (ALT 250 FOR IP): Performed by: OBSTETRICS & GYNECOLOGY

## 2017-07-27 RX ORDER — IBUPROFEN 800 MG/1
800 TABLET ORAL EVERY 8 HOURS
Qty: 40 TABLET | Refills: 0 | Status: SHIPPED | OUTPATIENT
Start: 2017-07-27 | End: 2018-09-05

## 2017-07-27 RX ORDER — FERROUS SULFATE TAB EC 324 MG (65 MG FE EQUIVALENT) 324 (65 FE) MG
324 TABLET DELAYED RESPONSE ORAL
Qty: 30 TABLET | Refills: 0 | Status: SHIPPED | OUTPATIENT
Start: 2017-07-27 | End: 2017-08-09

## 2017-07-27 RX ORDER — PSEUDOEPHEDRINE HCL 30 MG
100 TABLET ORAL 2 TIMES DAILY
Qty: 60 CAPSULE | Refills: 0 | Status: SHIPPED | OUTPATIENT
Start: 2017-07-27 | End: 2017-08-09

## 2017-07-27 RX ORDER — FERROUS SULFATE TAB EC 324 MG (65 MG FE EQUIVALENT) 324 (65 FE) MG
324 TABLET DELAYED RESPONSE ORAL
Status: DISCONTINUED | OUTPATIENT
Start: 2017-07-27 | End: 2017-07-28 | Stop reason: HOSPADM

## 2017-07-27 RX ORDER — ACETAMINOPHEN 325 MG/1
650 TABLET ORAL EVERY 4 HOURS PRN
Status: DISCONTINUED | OUTPATIENT
Start: 2017-07-27 | End: 2017-07-28 | Stop reason: HOSPADM

## 2017-07-27 RX ADMIN — DOCUSATE SODIUM 100 MG: 100 CAPSULE, LIQUID FILLED ORAL at 20:40

## 2017-07-27 RX ADMIN — HUMAN RHO(D) IMMUNE GLOBULIN 300 MCG: 1500 SOLUTION INTRAMUSCULAR; INTRAVENOUS at 13:44

## 2017-07-27 RX ADMIN — IRON SUPPLEMENT 324 MG: 325 TABLET ORAL at 08:08

## 2017-07-27 RX ADMIN — Medication 10 ML: at 09:00

## 2017-07-27 RX ADMIN — ACETAMINOPHEN 650 MG: 325 TABLET ORAL at 13:43

## 2017-07-27 RX ADMIN — IBUPROFEN 800 MG: 800 TABLET, FILM COATED ORAL at 16:06

## 2017-07-27 RX ADMIN — ACETAMINOPHEN 650 MG: 325 TABLET ORAL at 20:39

## 2017-07-27 RX ADMIN — DOCUSATE SODIUM 100 MG: 100 CAPSULE, LIQUID FILLED ORAL at 08:08

## 2017-07-27 RX ADMIN — ACETAMINOPHEN 1000 MG: 500 TABLET ORAL at 09:25

## 2017-07-27 RX ADMIN — Medication 10 ML: at 13:44

## 2017-07-27 RX ADMIN — IBUPROFEN 800 MG: 800 TABLET, FILM COATED ORAL at 08:08

## 2017-07-27 ASSESSMENT — PAIN SCALES - GENERAL
PAINLEVEL_OUTOF10: 6
PAINLEVEL_OUTOF10: 3
PAINLEVEL_OUTOF10: 5
PAINLEVEL_OUTOF10: 4
PAINLEVEL_OUTOF10: 5
PAINLEVEL_OUTOF10: 1

## 2017-07-28 VITALS
SYSTOLIC BLOOD PRESSURE: 106 MMHG | RESPIRATION RATE: 16 BRPM | HEIGHT: 62 IN | BODY MASS INDEX: 30.91 KG/M2 | DIASTOLIC BLOOD PRESSURE: 67 MMHG | HEART RATE: 63 BPM | OXYGEN SATURATION: 98 % | TEMPERATURE: 97.2 F | WEIGHT: 168 LBS

## 2017-07-28 LAB
ABO/RH: NORMAL
ANTIBODY SCREEN: NORMAL
BLD PROD TYP BPU: NORMAL
BLD PROD TYP BPU: NORMAL
DISPENSE STATUS BLOOD BANK: NORMAL
DU ANTIGEN: NORMAL
FETAL ROSETTE: NEGATIVE
HISTORY CHECK: NORMAL
Lab: 1
RHIG ELIGIBILITY: NORMAL
TRANSFUSION STATUS: NORMAL
UNIT DIVISION: 0
UNIT NUMBER: NORMAL

## 2017-07-28 PROCEDURE — 6360000002 HC RX W HCPCS: Performed by: OBSTETRICS & GYNECOLOGY

## 2017-07-28 PROCEDURE — 6370000000 HC RX 637 (ALT 250 FOR IP): Performed by: STUDENT IN AN ORGANIZED HEALTH CARE EDUCATION/TRAINING PROGRAM

## 2017-07-28 PROCEDURE — 90715 TDAP VACCINE 7 YRS/> IM: CPT | Performed by: OBSTETRICS & GYNECOLOGY

## 2017-07-28 PROCEDURE — 6370000000 HC RX 637 (ALT 250 FOR IP): Performed by: OBSTETRICS & GYNECOLOGY

## 2017-07-28 PROCEDURE — 90471 IMMUNIZATION ADMIN: CPT | Performed by: OBSTETRICS & GYNECOLOGY

## 2017-07-28 RX ADMIN — IBUPROFEN 800 MG: 800 TABLET, FILM COATED ORAL at 07:57

## 2017-07-28 RX ADMIN — DOCUSATE SODIUM 100 MG: 100 CAPSULE, LIQUID FILLED ORAL at 11:25

## 2017-07-28 RX ADMIN — TETANUS TOXOID, REDUCED DIPHTHERIA TOXOID AND ACELLULAR PERTUSSIS VACCINE, ADSORBED 0.5 ML: 5; 2.5; 8; 8; 2.5 SUSPENSION INTRAMUSCULAR at 11:43

## 2017-07-28 RX ADMIN — IBUPROFEN 800 MG: 800 TABLET, FILM COATED ORAL at 00:29

## 2017-07-28 RX ADMIN — ACETAMINOPHEN 650 MG: 325 TABLET ORAL at 06:01

## 2017-07-28 RX ADMIN — IRON SUPPLEMENT 324 MG: 325 TABLET ORAL at 11:25

## 2017-07-28 ASSESSMENT — PAIN SCALES - GENERAL
PAINLEVEL_OUTOF10: 3
PAINLEVEL_OUTOF10: 5
PAINLEVEL_OUTOF10: 5

## 2017-07-28 ASSESSMENT — PAIN DESCRIPTION - PROGRESSION
CLINICAL_PROGRESSION: GRADUALLY WORSENING

## 2017-07-31 LAB — SURGICAL PATHOLOGY REPORT: NORMAL

## 2017-08-09 ENCOUNTER — POSTPARTUM VISIT (OUTPATIENT)
Dept: OBGYN CLINIC | Age: 37
End: 2017-08-09
Payer: COMMERCIAL

## 2017-08-09 VITALS
SYSTOLIC BLOOD PRESSURE: 135 MMHG | WEIGHT: 151 LBS | DIASTOLIC BLOOD PRESSURE: 83 MMHG | HEART RATE: 77 BPM | BODY MASS INDEX: 27.62 KG/M2

## 2017-08-09 DIAGNOSIS — Z30.011 BCP (BIRTH CONTROL PILLS) INITIATION: Primary | ICD-10-CM

## 2017-08-09 PROCEDURE — 99212 OFFICE O/P EST SF 10 MIN: CPT | Performed by: SPECIALIST

## 2017-08-09 RX ORDER — ACETAMINOPHEN AND CODEINE PHOSPHATE 120; 12 MG/5ML; MG/5ML
1 SOLUTION ORAL DAILY
Qty: 28 TABLET | Refills: 3 | Status: SHIPPED | OUTPATIENT
Start: 2017-08-09 | End: 2018-09-05

## 2017-08-09 ASSESSMENT — ENCOUNTER SYMPTOMS
NAUSEA: 0
VOMITING: 0
DIARRHEA: 0
EYE PAIN: 0
ABDOMINAL PAIN: 0
CONSTIPATION: 0
APNEA: 0
ABDOMINAL DISTENTION: 0
COUGH: 0

## 2017-09-06 ENCOUNTER — POSTPARTUM VISIT (OUTPATIENT)
Dept: OBGYN CLINIC | Age: 37
End: 2017-09-06

## 2017-09-06 VITALS
BODY MASS INDEX: 29.81 KG/M2 | HEART RATE: 104 BPM | RESPIRATION RATE: 18 BRPM | SYSTOLIC BLOOD PRESSURE: 133 MMHG | DIASTOLIC BLOOD PRESSURE: 81 MMHG | HEIGHT: 62 IN | WEIGHT: 162 LBS

## 2017-09-06 DIAGNOSIS — Z86.32 HISTORY OF GESTATIONAL DIABETES: ICD-10-CM

## 2017-09-06 DIAGNOSIS — N76.0 ACUTE VAGINITIS: ICD-10-CM

## 2017-09-06 PROCEDURE — 0503F POSTPARTUM CARE VISIT: CPT | Performed by: SPECIALIST

## 2017-09-06 RX ORDER — FLUCONAZOLE 100 MG/1
100 TABLET ORAL DAILY
Qty: 7 TABLET | Refills: 0 | Status: SHIPPED | OUTPATIENT
Start: 2017-09-06 | End: 2017-09-13

## 2017-09-06 RX ORDER — METRONIDAZOLE 500 MG/1
500 TABLET ORAL 2 TIMES DAILY
Qty: 14 TABLET | Refills: 0 | Status: SHIPPED | OUTPATIENT
Start: 2017-09-06 | End: 2017-09-13

## 2017-09-06 ASSESSMENT — ENCOUNTER SYMPTOMS
EYE PAIN: 0
ABDOMINAL PAIN: 0
APNEA: 0
NAUSEA: 0
ABDOMINAL DISTENTION: 0
COUGH: 0
VOMITING: 0
DIARRHEA: 0
CONSTIPATION: 0

## 2017-09-19 ENCOUNTER — HOSPITAL ENCOUNTER (OUTPATIENT)
Age: 37
Discharge: HOME | End: 2017-09-19
Payer: COMMERCIAL

## 2017-09-19 DIAGNOSIS — Z86.32 HISTORY OF GESTATIONAL DIABETES: ICD-10-CM

## 2017-09-19 LAB
AMOUNT GLUCOSE GIVEN: 75 G
GLUCOSE FASTING: 100 MG/DL (ref 65–99)
GLUCOSE TOLERANCE TEST 1 HOUR: 70 MG/DL (ref 65–184)
GLUCOSE TOLERANCE TEST 2 HOUR: 71 MG/DL (ref 65–139)

## 2017-09-19 PROCEDURE — 82951 GLUCOSE TOLERANCE TEST (GTT): CPT

## 2017-09-19 PROCEDURE — 36415 COLL VENOUS BLD VENIPUNCTURE: CPT

## 2018-09-05 ENCOUNTER — HOSPITAL ENCOUNTER (EMERGENCY)
Facility: CLINIC | Age: 38
Discharge: HOME | End: 2018-09-05
Attending: SPECIALIST
Payer: MEDICAID

## 2018-09-05 VITALS
OXYGEN SATURATION: 100 % | HEART RATE: 113 BPM | HEIGHT: 62 IN | WEIGHT: 160 LBS | SYSTOLIC BLOOD PRESSURE: 134 MMHG | RESPIRATION RATE: 16 BRPM | BODY MASS INDEX: 29.44 KG/M2 | DIASTOLIC BLOOD PRESSURE: 93 MMHG | TEMPERATURE: 98.3 F

## 2018-09-05 DIAGNOSIS — E86.0 DEHYDRATION: ICD-10-CM

## 2018-09-05 DIAGNOSIS — R19.7 NAUSEA VOMITING AND DIARRHEA: ICD-10-CM

## 2018-09-05 DIAGNOSIS — R11.2 NAUSEA VOMITING AND DIARRHEA: ICD-10-CM

## 2018-09-05 DIAGNOSIS — R10.33 PERIUMBILICAL ABDOMINAL PAIN: Primary | ICD-10-CM

## 2018-09-05 LAB
-: ABNORMAL
ABSOLUTE EOS #: 0.1 K/UL (ref 0–0.4)
ABSOLUTE IMMATURE GRANULOCYTE: ABNORMAL K/UL (ref 0–0.3)
ABSOLUTE LYMPH #: 1.6 K/UL (ref 1–4.8)
ABSOLUTE MONO #: 0.3 K/UL (ref 0.1–1.2)
ALBUMIN SERPL-MCNC: 4.3 G/DL (ref 3.5–5.2)
ALBUMIN/GLOBULIN RATIO: 1.3 (ref 1–2.5)
ALP BLD-CCNC: 104 U/L (ref 35–104)
ALT SERPL-CCNC: 10 U/L (ref 5–33)
AMORPHOUS: ABNORMAL
ANION GAP SERPL CALCULATED.3IONS-SCNC: 14 MMOL/L (ref 9–17)
AST SERPL-CCNC: 11 U/L
BACTERIA: ABNORMAL
BASOPHILS # BLD: 0 % (ref 0–2)
BASOPHILS ABSOLUTE: 0 K/UL (ref 0–0.2)
BILIRUB SERPL-MCNC: 0.6 MG/DL (ref 0.3–1.2)
BILIRUBIN URINE: ABNORMAL
BUN BLDV-MCNC: 12 MG/DL (ref 6–20)
BUN/CREAT BLD: ABNORMAL (ref 9–20)
CALCIUM SERPL-MCNC: 9.2 MG/DL (ref 8.6–10.4)
CASTS UA: ABNORMAL /LPF (ref 0–2)
CHLORIDE BLD-SCNC: 100 MMOL/L (ref 98–107)
CO2: 26 MMOL/L (ref 20–31)
COLOR: YELLOW
COMMENT UA: ABNORMAL
CREAT SERPL-MCNC: 0.8 MG/DL (ref 0.5–0.9)
CRYSTALS, UA: ABNORMAL /HPF
DIFFERENTIAL TYPE: ABNORMAL
EOSINOPHILS RELATIVE PERCENT: 1 % (ref 1–4)
EPITHELIAL CELLS UA: ABNORMAL /HPF (ref 0–5)
GFR AFRICAN AMERICAN: >60 ML/MIN
GFR NON-AFRICAN AMERICAN: >60 ML/MIN
GFR SERPL CREATININE-BSD FRML MDRD: ABNORMAL ML/MIN/{1.73_M2}
GFR SERPL CREATININE-BSD FRML MDRD: ABNORMAL ML/MIN/{1.73_M2}
GLUCOSE BLD-MCNC: 107 MG/DL (ref 70–99)
GLUCOSE URINE: NEGATIVE
HCG(URINE) PREGNANCY TEST: NEGATIVE
HCT VFR BLD CALC: 42.3 % (ref 36–46)
HEMOGLOBIN: 14.1 G/DL (ref 12–16)
IMMATURE GRANULOCYTES: ABNORMAL %
KETONES, URINE: ABNORMAL
LACTIC ACID: 1.6 MMOL/L (ref 0.5–2.2)
LEUKOCYTE ESTERASE, URINE: NEGATIVE
LIPASE: 13 U/L (ref 13–60)
LYMPHOCYTES # BLD: 25 % (ref 24–44)
MCH RBC QN AUTO: 28.2 PG (ref 26–34)
MCHC RBC AUTO-ENTMCNC: 33.3 G/DL (ref 31–37)
MCV RBC AUTO: 84.7 FL (ref 80–100)
MONOCYTES # BLD: 5 % (ref 2–11)
MUCUS: ABNORMAL
NITRITE, URINE: NEGATIVE
NRBC AUTOMATED: ABNORMAL PER 100 WBC
OTHER OBSERVATIONS UA: ABNORMAL
PDW BLD-RTO: 13.8 % (ref 12.5–15.4)
PH UA: 6 (ref 5–8)
PLATELET # BLD: 326 K/UL (ref 140–450)
PLATELET ESTIMATE: ABNORMAL
PMV BLD AUTO: 8.7 FL (ref 6–12)
POTASSIUM SERPL-SCNC: 4 MMOL/L (ref 3.7–5.3)
PROTEIN UA: ABNORMAL
RBC # BLD: 4.99 M/UL (ref 4–5.2)
RBC # BLD: ABNORMAL 10*6/UL
RBC UA: ABNORMAL /HPF (ref 0–2)
RENAL EPITHELIAL, UA: ABNORMAL /HPF
SEG NEUTROPHILS: 69 % (ref 36–66)
SEGMENTED NEUTROPHILS ABSOLUTE COUNT: 4.5 K/UL (ref 1.8–7.7)
SODIUM BLD-SCNC: 140 MMOL/L (ref 135–144)
SPECIFIC GRAVITY UA: 1.02 (ref 1–1.03)
TOTAL PROTEIN: 7.5 G/DL (ref 6.4–8.3)
TRICHOMONAS: ABNORMAL
TURBIDITY: CLEAR
URINE HGB: NEGATIVE
UROBILINOGEN, URINE: NORMAL
WBC # BLD: 6.6 K/UL (ref 3.5–11)
WBC # BLD: ABNORMAL 10*3/UL
WBC UA: ABNORMAL /HPF (ref 0–5)
YEAST: ABNORMAL

## 2018-09-05 PROCEDURE — 96374 THER/PROPH/DIAG INJ IV PUSH: CPT

## 2018-09-05 PROCEDURE — 84703 CHORIONIC GONADOTROPIN ASSAY: CPT

## 2018-09-05 PROCEDURE — 2580000003 HC RX 258: Performed by: SPECIALIST

## 2018-09-05 PROCEDURE — 85025 COMPLETE CBC W/AUTO DIFF WBC: CPT

## 2018-09-05 PROCEDURE — 83605 ASSAY OF LACTIC ACID: CPT

## 2018-09-05 PROCEDURE — 80053 COMPREHEN METABOLIC PANEL: CPT

## 2018-09-05 PROCEDURE — 96361 HYDRATE IV INFUSION ADD-ON: CPT

## 2018-09-05 PROCEDURE — 99284 EMERGENCY DEPT VISIT MOD MDM: CPT

## 2018-09-05 PROCEDURE — 96375 TX/PRO/DX INJ NEW DRUG ADDON: CPT

## 2018-09-05 PROCEDURE — 36415 COLL VENOUS BLD VENIPUNCTURE: CPT

## 2018-09-05 PROCEDURE — 81001 URINALYSIS AUTO W/SCOPE: CPT

## 2018-09-05 PROCEDURE — 6360000002 HC RX W HCPCS: Performed by: SPECIALIST

## 2018-09-05 PROCEDURE — 83690 ASSAY OF LIPASE: CPT

## 2018-09-05 RX ORDER — ONDANSETRON 4 MG/1
4 TABLET, ORALLY DISINTEGRATING ORAL EVERY 8 HOURS PRN
Qty: 12 TABLET | Refills: 0 | Status: SHIPPED | OUTPATIENT
Start: 2018-09-05 | End: 2019-09-13

## 2018-09-05 RX ORDER — MORPHINE SULFATE 2 MG/ML
2 INJECTION, SOLUTION INTRAMUSCULAR; INTRAVENOUS ONCE
Status: COMPLETED | OUTPATIENT
Start: 2018-09-05 | End: 2018-09-05

## 2018-09-05 RX ORDER — 0.9 % SODIUM CHLORIDE 0.9 %
1000 INTRAVENOUS SOLUTION INTRAVENOUS ONCE
Status: COMPLETED | OUTPATIENT
Start: 2018-09-05 | End: 2018-09-05

## 2018-09-05 RX ORDER — ONDANSETRON 2 MG/ML
4 INJECTION INTRAMUSCULAR; INTRAVENOUS ONCE
Status: COMPLETED | OUTPATIENT
Start: 2018-09-05 | End: 2018-09-05

## 2018-09-05 RX ORDER — SODIUM CHLORIDE 9 MG/ML
INJECTION, SOLUTION INTRAVENOUS CONTINUOUS
Status: DISCONTINUED | OUTPATIENT
Start: 2018-09-05 | End: 2018-09-05 | Stop reason: HOSPADM

## 2018-09-05 RX ADMIN — ONDANSETRON HYDROCHLORIDE 4 MG: 2 INJECTION, SOLUTION INTRAMUSCULAR; INTRAVENOUS at 13:52

## 2018-09-05 RX ADMIN — SODIUM CHLORIDE 1000 ML: 9 INJECTION, SOLUTION INTRAVENOUS at 13:52

## 2018-09-05 RX ADMIN — MORPHINE SULFATE 2 MG: 2 INJECTION, SOLUTION INTRAMUSCULAR; INTRAVENOUS at 13:52

## 2018-09-05 ASSESSMENT — ENCOUNTER SYMPTOMS
DIARRHEA: 1
VOMITING: 1
BLOOD IN STOOL: 0
CONSTIPATION: 0
NAUSEA: 1
BACK PAIN: 0
ABDOMINAL PAIN: 1

## 2018-09-05 ASSESSMENT — PAIN SCALES - GENERAL
PAINLEVEL_OUTOF10: 3
PAINLEVEL_OUTOF10: 3
PAINLEVEL_OUTOF10: 2

## 2018-09-05 ASSESSMENT — PAIN DESCRIPTION - PAIN TYPE: TYPE: ACUTE PAIN

## 2018-09-05 ASSESSMENT — PAIN DESCRIPTION - ORIENTATION: ORIENTATION: RIGHT;UPPER

## 2018-09-05 ASSESSMENT — PAIN DESCRIPTION - PROGRESSION: CLINICAL_PROGRESSION: GRADUALLY IMPROVING

## 2018-09-05 ASSESSMENT — PAIN DESCRIPTION - LOCATION: LOCATION: ABDOMEN

## 2018-09-05 NOTE — ED PROVIDER NOTES
[facility]  [address] [facility] [address]  Phone: [phone]      Pt Name: Addis Mckeon  MRN: [id number]  [place] 1980  Date of evaluation: 9/5/2018      CHIEF COMPLAINT       Chief Complaint   Patient presents with    Emesis     Vomiting and diarrhea yesterday. Today, improved but persistant diarrhea, no vomiting, and RUQ abd pain.  Diarrhea    Abdominal Pain         HISTORY OF PRESENT ILLNESS    Addis Mckeon is a 30 y.o. female who presents   Chief Complaint   Patient presents with    Emesis     Vomiting and diarrhea yesterday. Today, improved but persistant diarrhea, no vomiting, and RUQ abd pain.  Diarrhea    Abdominal Pain   . 79-year-old female patient presents to the emergency department complaining of abdominal pain, nausea, vomiting and diarrhea. Patient started having vomiting yesterday and has had about 7-8 episodes of vomiting yesterday but none today. She has had about 5 episodes of diarrhea yesterday and once this morning. She started having abdominal pain on the right side of the umbilicus at about 10 a.m. this morning and describes abdominal pain as sharp stabbing and burning in nature 8-9 out of 10 in intensity. She denies any hematemesis, melena or hematochezia and denies any fever or chills. She denies any flank pain, back pain, lightheadedness or dizziness and denies any urinary frequency, urgency, dysuria or hematuria. Abdominal pain is worse with the coughing and straining abdominal muscles and is better by laying on her back and pushing on the abdomen. Patient has not taken any medications for the pain. Her last menstrual period was 5 days ago and patient denies any vaginal bleeding or discharge. She has not had any abdominal surgeries in the past.     REVIEW OF SYSTEMS       Review of Systems   Constitutional: Negative for chills and fever. Gastrointestinal: Positive for abdominal pain, diarrhea, nausea and vomiting.  Negative for blood in Neurological: She is alert and oriented to person, place, and time. Skin: Skin is warm and dry. Nursing note and vitals reviewed. DIFFERENTIAL DIAGNOSIS/ MDM:     Cholecystitis, appendicitis, pancreatitis,  urinary tract infection, renal stone, small bowel obstruction,diverticulitis, gastritis, enteritis, colitis. DIAGNOSTIC RESULTS     EKG: All EKG's are interpreted by the Emergency Department Physician who either signs or Co-signs this chart in the absence of a cardiologist.    None clinically indicated    RADIOLOGY:   I directly visualized the following  images and reviewed the radiologist interpretations:  No orders to display      None clinically indicated      LABS:  Labs Reviewed   CBC WITH AUTO DIFFERENTIAL - Abnormal; Notable for the following:        Result Value    Seg Neutrophils 69 (*)     All other components within normal limits   COMPREHENSIVE METABOLIC PANEL - Abnormal; Notable for the following:     Glucose 107 (*)     All other components within normal limits   URINE RT REFLEX TO CULTURE - Abnormal; Notable for the following:     Bilirubin Urine NEGATIVE  Verified by ictotest. (*)     Ketones, Urine MODERATE (*)     Protein, UA TRACE (*)     All other components within normal limits   MICROSCOPIC URINALYSIS - Abnormal; Notable for the following: Other Observations UA   (*)     Value: Utilizing a urinalysis as the only screening method to exclude a potential    All other components within normal limits   LIPASE   PREGNANCY, URINE   LACTIC ACID       I reviewed all the lab results, patient has moderate ketones in the urine, rest of the labs are within normal range.     EMERGENCY DEPARTMENT COURSE:   Vitals:    Vitals:    09/05/18 1326   BP: (!) 134/93   Pulse: 113   Resp: 18   Temp: 98.3 °F (36.8 °C)   TempSrc: Oral   SpO2: 100%   Weight: 72.6 kg (160 lb)   Height: 5' 2\" (1.575 m)     -------------------------  BP: (!) 134/93, Temp: 98.3 °F (36.8 °C), Pulse: 113, Resp: 18    Orders

## 2019-09-13 ENCOUNTER — HOSPITAL ENCOUNTER (EMERGENCY)
Facility: CLINIC | Age: 39
Discharge: HOME | End: 2019-09-13
Attending: EMERGENCY MEDICINE
Payer: MEDICAID

## 2019-09-13 VITALS
WEIGHT: 140 LBS | SYSTOLIC BLOOD PRESSURE: 134 MMHG | HEART RATE: 99 BPM | BODY MASS INDEX: 25.76 KG/M2 | OXYGEN SATURATION: 98 % | TEMPERATURE: 97.9 F | HEIGHT: 62 IN | DIASTOLIC BLOOD PRESSURE: 94 MMHG | RESPIRATION RATE: 16 BRPM

## 2019-09-13 DIAGNOSIS — S05.01XA RIGHT CORNEAL ABRASION, INITIAL ENCOUNTER: Primary | ICD-10-CM

## 2019-09-13 PROCEDURE — 99283 EMERGENCY DEPT VISIT LOW MDM: CPT

## 2019-09-13 RX ORDER — GENTAMICIN SULFATE 3 MG/ML
1 SOLUTION/ DROPS OPHTHALMIC EVERY 4 HOURS
Qty: 5 ML | Refills: 0 | Status: SHIPPED | OUTPATIENT
Start: 2019-09-13 | End: 2019-09-20

## 2019-09-13 ASSESSMENT — PAIN SCALES - GENERAL: PAINLEVEL_OUTOF10: 5

## 2019-09-13 ASSESSMENT — PAIN DESCRIPTION - LOCATION: LOCATION: EYE

## 2019-09-13 ASSESSMENT — ENCOUNTER SYMPTOMS
EYE ITCHING: 1
EYE PAIN: 1
EYE REDNESS: 1
EYE DISCHARGE: 1

## 2019-09-13 ASSESSMENT — PAIN DESCRIPTION - ORIENTATION: ORIENTATION: RIGHT

## 2020-05-28 ENCOUNTER — HOSPITAL ENCOUNTER (OUTPATIENT)
Age: 40
Setting detail: SPECIMEN
Discharge: HOME | End: 2020-05-28
Payer: MEDICAID

## 2020-05-28 ENCOUNTER — OFFICE VISIT (OUTPATIENT)
Dept: OBGYN CLINIC | Age: 40
End: 2020-05-28
Payer: MEDICAID

## 2020-05-28 VITALS
HEART RATE: 104 BPM | DIASTOLIC BLOOD PRESSURE: 82 MMHG | BODY MASS INDEX: 25.51 KG/M2 | HEIGHT: 62 IN | WEIGHT: 138.6 LBS | SYSTOLIC BLOOD PRESSURE: 138 MMHG | TEMPERATURE: 99.3 F

## 2020-05-28 LAB
CONTROL: ABNORMAL
PREGNANCY TEST URINE, POC: POSITIVE

## 2020-05-28 PROCEDURE — 1036F TOBACCO NON-USER: CPT | Performed by: CLINICAL NURSE SPECIALIST

## 2020-05-28 PROCEDURE — 99213 OFFICE O/P EST LOW 20 MIN: CPT | Performed by: CLINICAL NURSE SPECIALIST

## 2020-05-28 PROCEDURE — 81025 URINE PREGNANCY TEST: CPT | Performed by: CLINICAL NURSE SPECIALIST

## 2020-05-28 PROCEDURE — G8427 DOCREV CUR MEDS BY ELIG CLIN: HCPCS | Performed by: CLINICAL NURSE SPECIALIST

## 2020-05-28 PROCEDURE — G8419 CALC BMI OUT NRM PARAM NOF/U: HCPCS | Performed by: CLINICAL NURSE SPECIALIST

## 2020-05-28 RX ORDER — VITAMIN A ACETATE, .BETA.-CAROTENE, ASCORBIC ACID, CHOLECALCIFEROL, .ALPHA.-TOCOPHEROL ACETATE, DL-, THIAMINE MONONITRATE, RIBOFLAVIN, NIACINAMIDE, PYRIDOXINE HYDROCHLORIDE, FOLIC ACID, CYANOCOBALAMIN, CALCIUM CARBONATE, FERROUS FUMARATE, ZINC OXIDE, AND CUPRIC OXIDE 2000; 2000; 120; 400; 22; 1.84; 3; 20; 10; 1; 12; 200; 27; 25; 2 [IU]/1; [IU]/1; MG/1; [IU]/1; MG/1; MG/1; MG/1; MG/1; MG/1; MG/1; UG/1; MG/1; MG/1; MG/1; MG/1
1 TABLET ORAL DAILY
Qty: 30 TABLET | Refills: 11 | Status: SHIPPED | OUTPATIENT
Start: 2020-05-28 | End: 2021-02-18

## 2020-05-28 NOTE — PROGRESS NOTES
DATE OF VISIT:  20  PATIENT NAME:  Freeman Bajwa     YOB: 1980    SUBJECTIVE:    Chief Complaint:  Chief Complaint   Patient presents with    Amenorrhea     LMP 2020       HPI:  Damon Blanc  is being seen today for missed menses. She reports a  positive pregnancy test on 20. This  is not a planned pregnancy. She is not accepting at this time. LMP: 20      Sure and definite: Yes     28 day cycle: Yes    She was not on a contraceptive at the time of conception. Estimated weeks gestation today : 6w 2d  Tentative SUZANNA: 21    Relationship with FOB: involved    OBJECTIVE:    Vitals:    20 1624 20 1634   BP: (!) 141/96 138/82   Site:  Right Upper Arm   Position:  Sitting   Cuff Size:  Medium Adult   Pulse: 104    Temp: 99.3 °F (37.4 °C)    TempSrc: Oral    Weight: 138 lb 9.6 oz (62.9 kg)    Height: 5' 2\" (1.575 m)      Body mass index is 25.35 kg/m². Patient's last menstrual period was 2020 (approximate). Mother's ethnicity:    Father's ethnicity:      She is complaining today of:   Pain: No  Cramping: Yes intermittent  Bleeding or spotting: No    Nausea: No  Vomiting: No    Breast enlargement/tenderness: Yes  Fatigue: Yes  Frequency of urination: Yes    Previous high risk obstetrical history: yes GDM  OB History    Para Term  AB Living   4 3 3     3   SAB TAB Ectopic Molar Multiple Live Births             3      # Outcome Date GA Lbr Joel/2nd Weight Sex Delivery Anes PTL Lv   4 Current            3 Term 17 39w0d 00:38 / 00:05 6 lb 14.1 oz (3.12 kg) M Vag-Spont EPI N PALLAVI   2 Term 10/12/10 39w0d  7 lb 7 oz (3.374 kg) F Vag-Spont EPI N PALLAVI   1 Term 08 39w0d  7 lb 14 oz (3.572 kg) F Vag-Spont EPI N PALLAVI       ROS was done and is negative except as documented in HPI. History was reviewed as documented on Epic Navigator. ASSESSMENT:  Missed menses with + UCG  1. Missed menses    2. Amenorrhea    3.  Positive pregnancy test    4. Pregnancy with inconclusive fetal viability, single or unspecified fetus    Patient was in the office today for a beta Hcg. Draw per physician order using sterile technique. Drawn from the right antecube. Patient is very ambivalent with pregnancy diagnosis     PLAN:  UCG was done and noted as positive  Prenatal vitamins were ordered: Yes  Ultrasound for dating and viability was ordered: Yes  1 hour glucola was ordered: Yes  She was instructed to call with any concerns or worsening of any symptoms  She will return for New OB intake visit  Becki pinedo reviewed    Patient was seen with total face to face time of 15 minutes. More than 50% of this visit was spent face to face coordinating plan of care and answering questions . She was also counseled on her preventative health maintenance recommendations and follow-up. Return for call in am for quant level if 5000 or above will schedule dating US.     Electronically signed by: Payal Pack CNP

## 2020-05-29 LAB — HCG QUANTITATIVE: ABNORMAL IU/L

## 2020-06-08 ENCOUNTER — TELEPHONE (OUTPATIENT)
Dept: OBGYN CLINIC | Age: 40
End: 2020-06-08

## 2020-06-08 RX ORDER — DOCUSATE SODIUM 100 MG/1
100 CAPSULE, LIQUID FILLED ORAL 2 TIMES DAILY PRN
Qty: 60 CAPSULE | Refills: 1 | Status: SHIPPED | OUTPATIENT
Start: 2020-06-08 | End: 2020-07-08

## 2020-06-08 RX ORDER — PROMETHAZINE HYDROCHLORIDE 25 MG/1
25 TABLET ORAL EVERY 8 HOURS PRN
Qty: 30 TABLET | Refills: 2 | Status: SHIPPED | OUTPATIENT
Start: 2020-06-08 | End: 2021-02-02

## 2020-06-23 ENCOUNTER — TELEPHONE (OUTPATIENT)
Dept: OBGYN CLINIC | Age: 40
End: 2020-06-23

## 2020-06-23 RX ORDER — SENNA PLUS 8.6 MG/1
1 TABLET ORAL DAILY PRN
Qty: 60 TABLET | Refills: 0 | Status: SHIPPED | OUTPATIENT
Start: 2020-06-23 | End: 2021-02-02

## 2020-06-30 ENCOUNTER — INITIAL PRENATAL (OUTPATIENT)
Dept: OBGYN CLINIC | Age: 40
End: 2020-06-30
Payer: MEDICAID

## 2020-06-30 ENCOUNTER — HOSPITAL ENCOUNTER (OUTPATIENT)
Age: 40
Setting detail: SPECIMEN
Discharge: HOME | End: 2020-06-30
Payer: MEDICAID

## 2020-06-30 VITALS
SYSTOLIC BLOOD PRESSURE: 117 MMHG | WEIGHT: 143 LBS | DIASTOLIC BLOOD PRESSURE: 72 MMHG | TEMPERATURE: 99.2 F | BODY MASS INDEX: 26.16 KG/M2 | HEART RATE: 83 BPM

## 2020-06-30 PROBLEM — O35.DXX0 ECHOGENIC FOCUS OF BOWEL, FETAL, AFFECTING CARE OF MOTHER, ANTEPARTUM: Status: RESOLVED | Noted: 2017-04-25 | Resolved: 2020-06-30

## 2020-06-30 PROBLEM — O09.521 HIGH RISK PREGNANCY, MULTIGRAVIDA OF ADVANCED MATERNAL AGE IN FIRST TRIMESTER: Status: ACTIVE | Noted: 2017-07-11

## 2020-06-30 PROBLEM — O24.410 DIET CONTROLLED GESTATIONAL DIABETES MELLITUS (GDM) IN THIRD TRIMESTER: Status: RESOLVED | Noted: 2017-07-11 | Resolved: 2020-06-30

## 2020-06-30 PROBLEM — Z86.32 HISTORY OF GESTATIONAL DIABETES MELLITUS (GDM): Status: ACTIVE | Noted: 2020-06-30

## 2020-06-30 LAB
-: ABNORMAL
AMORPHOUS: ABNORMAL
AMPHETAMINE SCREEN URINE: NEGATIVE
BACTERIA: ABNORMAL
BARBITURATE SCREEN URINE: NEGATIVE
BENZODIAZEPINE SCREEN, URINE: NEGATIVE
BILIRUBIN URINE: NEGATIVE
BUPRENORPHINE URINE: ABNORMAL
CANNABINOID SCREEN URINE: POSITIVE
CASTS UA: ABNORMAL /LPF (ref 0–8)
COCAINE METABOLITE, URINE: NEGATIVE
COLOR: YELLOW
COMMENT UA: ABNORMAL
CRYSTALS, UA: ABNORMAL /HPF
EPITHELIAL CELLS UA: ABNORMAL /HPF (ref 0–5)
GLUCOSE URINE: NEGATIVE
KETONES, URINE: NEGATIVE
LEUKOCYTE ESTERASE, URINE: ABNORMAL
MDMA URINE: ABNORMAL
METHADONE SCREEN, URINE: NEGATIVE
METHAMPHETAMINE, URINE: ABNORMAL
MUCUS: ABNORMAL
NITRITE, URINE: NEGATIVE
OPIATES, URINE: NEGATIVE
OTHER OBSERVATIONS UA: ABNORMAL
OXYCODONE SCREEN URINE: NEGATIVE
PH UA: 5 (ref 5–8)
PHENCYCLIDINE, URINE: NEGATIVE
PROPOXYPHENE, URINE: ABNORMAL
PROTEIN UA: NEGATIVE
RBC UA: ABNORMAL /HPF (ref 0–4)
RENAL EPITHELIAL, UA: ABNORMAL /HPF
SICKLE CELL SCREEN: NEGATIVE
SPECIFIC GRAVITY UA: 1.02 (ref 1–1.03)
TEST INFORMATION: ABNORMAL
TRICHOMONAS: ABNORMAL
TRICYCLIC ANTIDEPRESSANTS, UR: ABNORMAL
TURBIDITY: ABNORMAL
URINE HGB: NEGATIVE
UROBILINOGEN, URINE: NORMAL
WBC UA: ABNORMAL /HPF (ref 0–5)
YEAST: ABNORMAL

## 2020-06-30 PROCEDURE — 36415 COLL VENOUS BLD VENIPUNCTURE: CPT | Performed by: CLINICAL NURSE SPECIALIST

## 2020-06-30 PROCEDURE — 1036F TOBACCO NON-USER: CPT | Performed by: CLINICAL NURSE SPECIALIST

## 2020-06-30 PROCEDURE — G8419 CALC BMI OUT NRM PARAM NOF/U: HCPCS | Performed by: CLINICAL NURSE SPECIALIST

## 2020-06-30 PROCEDURE — 99213 OFFICE O/P EST LOW 20 MIN: CPT | Performed by: CLINICAL NURSE SPECIALIST

## 2020-06-30 PROCEDURE — G8427 DOCREV CUR MEDS BY ELIG CLIN: HCPCS | Performed by: CLINICAL NURSE SPECIALIST

## 2020-06-30 PROCEDURE — H1000 PRENATAL CARE ATRISK ASSESSM: HCPCS | Performed by: CLINICAL NURSE SPECIALIST

## 2020-06-30 NOTE — PROGRESS NOTES
Hoda Bustillo is a 42 y.o. female 10w3d    [id number]    OB History    Para Term  AB Living   5 3 3   1 3   SAB TAB Ectopic Molar Multiple Live Births   1         3      # Outcome Date GA Lbr Joel/2nd Weight Sex Delivery Anes PTL Lv   5 Current            4 Term 17 39w0d 00:38 / 00:05 6 lb 14.1 oz (3.12 kg) M Vag-Spont EPI N PALLAVI   3 SAB            2 Term 10/12/10 39w0d  7 lb 7 oz (3.374 kg) F Vag-Spont EPI N PALLAVI   1 Term 08 39w0d  7 lb 14 oz (3.572 kg) F Vag-Spont EPI N PALLAVI       Blood pressure 117/72, pulse 83, temperature 99.2 °F (37.3 °C), temperature source Oral, weight 143 lb (64.9 kg), last menstrual period 2020, not currently breastfeeding. The patient was seen and evaluated. There was N/A fetal movements. No contractions or leakage of fluid. Signs and symptoms of  labor as well as labor were reviewed. Dates were reviewed with the patient. The physical exam will be completed along with pap and cultures at next visit. Prenatal labs were drawn today. The patient will return to the office for her next visit in 1 weeks. See antepartum flow sheet. Patient Active Problem List    Diagnosis Date Noted    History of gestational diabetes mellitus (GDM) 2020    Marijuana use 2017     UDS + THC on 17  M Apg 8/8 Wt 6#14  2017    High risk pregnancy, multigravida of advanced maternal age in first trimester 2017        Diagnosis Orders   1. High risk pregnancy, multigravida of advanced maternal age in first trimester  PAP SMEAR    C.trachomatis N.gonorrhoeae DNA    Maternal Serum Screen Quad   2. 10 weeks gestation of pregnancy  PAP SMEAR    C.trachomatis N.gonorrhoeae DNA    Maternal Serum Screen Quad   3. History of gestational diabetes mellitus (GDM)     4. Genetic screening  Maternal Serum Screen Quad   Continue prenatal vitamins, increase water intake and frequent rest periods as needed.     10w3d Prenatal history and OB

## 2020-07-01 LAB
ABO/RH: NORMAL
ABSOLUTE EOS #: 0.03 K/UL (ref 0–0.44)
ABSOLUTE IMMATURE GRANULOCYTE: <0.03 K/UL (ref 0–0.3)
ABSOLUTE LYMPH #: 1.57 K/UL (ref 1.1–3.7)
ABSOLUTE MONO #: 0.36 K/UL (ref 0.1–1.2)
ANTIBODY SCREEN: NEGATIVE
BASOPHILS # BLD: 0 % (ref 0–2)
BASOPHILS ABSOLUTE: 0.03 K/UL (ref 0–0.2)
CULTURE: NORMAL
DIFFERENTIAL TYPE: ABNORMAL
EOSINOPHILS RELATIVE PERCENT: 0 % (ref 1–4)
GLUCOSE ADMINISTRATION: NORMAL
GLUCOSE TOLERANCE SCREEN 50G: 103 MG/DL (ref 70–135)
HCT VFR BLD CALC: 38.3 % (ref 36.3–47.1)
HEMOGLOBIN: 12.4 G/DL (ref 11.9–15.1)
HEPATITIS B SURFACE ANTIGEN: NONREACTIVE
HIV AG/AB: NONREACTIVE
IMMATURE GRANULOCYTES: 0 %
LYMPHOCYTES # BLD: 21 % (ref 24–43)
Lab: NORMAL
MCH RBC QN AUTO: 30.1 PG (ref 25.2–33.5)
MCHC RBC AUTO-ENTMCNC: 32.4 G/DL (ref 28.4–34.8)
MCV RBC AUTO: 93 FL (ref 82.6–102.9)
MONOCYTES # BLD: 5 % (ref 3–12)
NRBC AUTOMATED: 0 PER 100 WBC
PDW BLD-RTO: 13 % (ref 11.8–14.4)
PLATELET # BLD: 328 K/UL (ref 138–453)
PLATELET ESTIMATE: ABNORMAL
PMV BLD AUTO: 10.5 FL (ref 8.1–13.5)
RBC # BLD: 4.12 M/UL (ref 3.95–5.11)
RBC # BLD: ABNORMAL 10*6/UL
RUBV IGG SER QL: 6.9 IU/ML
SEG NEUTROPHILS: 74 % (ref 36–65)
SEGMENTED NEUTROPHILS ABSOLUTE COUNT: 5.5 K/UL (ref 1.5–8.1)
SPECIMEN DESCRIPTION: NORMAL
T. PALLIDUM, IGG: NONREACTIVE
TSH SERPL DL<=0.05 MIU/L-ACNC: 1.57 MIU/L (ref 0.3–5)
WBC # BLD: 7.5 K/UL (ref 3.5–11.3)
WBC # BLD: ABNORMAL 10*3/UL

## 2020-07-01 RX ORDER — NITROFURANTOIN 25; 75 MG/1; MG/1
100 CAPSULE ORAL 2 TIMES DAILY
Qty: 20 CAPSULE | Refills: 0 | Status: SHIPPED | OUTPATIENT
Start: 2020-07-01 | End: 2020-07-11

## 2020-07-14 ENCOUNTER — ROUTINE PRENATAL (OUTPATIENT)
Dept: OBGYN CLINIC | Age: 40
End: 2020-07-14
Payer: MEDICAID

## 2020-07-14 ENCOUNTER — HOSPITAL ENCOUNTER (OUTPATIENT)
Age: 40
Setting detail: SPECIMEN
Discharge: HOME | End: 2020-07-14
Payer: MEDICAID

## 2020-07-14 VITALS
TEMPERATURE: 99.3 F | SYSTOLIC BLOOD PRESSURE: 117 MMHG | WEIGHT: 142.2 LBS | HEART RATE: 92 BPM | DIASTOLIC BLOOD PRESSURE: 74 MMHG | BODY MASS INDEX: 26.01 KG/M2

## 2020-07-14 PROCEDURE — G8419 CALC BMI OUT NRM PARAM NOF/U: HCPCS | Performed by: CLINICAL NURSE SPECIALIST

## 2020-07-14 PROCEDURE — 99213 OFFICE O/P EST LOW 20 MIN: CPT | Performed by: CLINICAL NURSE SPECIALIST

## 2020-07-14 PROCEDURE — G8427 DOCREV CUR MEDS BY ELIG CLIN: HCPCS | Performed by: CLINICAL NURSE SPECIALIST

## 2020-07-14 PROCEDURE — 1036F TOBACCO NON-USER: CPT | Performed by: CLINICAL NURSE SPECIALIST

## 2020-07-14 RX ORDER — FLUCONAZOLE 100 MG/1
100 TABLET ORAL DAILY
Qty: 7 TABLET | Refills: 0 | Status: SHIPPED | OUTPATIENT
Start: 2020-07-14 | End: 2020-07-21

## 2020-07-14 RX ORDER — METRONIDAZOLE 500 MG/1
500 TABLET ORAL 2 TIMES DAILY
Qty: 14 TABLET | Refills: 0 | Status: SHIPPED | OUTPATIENT
Start: 2020-07-14 | End: 2020-07-21

## 2020-07-14 NOTE — PROGRESS NOTES
Zulema Bolaños is a 42 y.o. female 12w3d    [id number]    OB History    Para Term  AB Living   5 3 3   1 3   SAB TAB Ectopic Molar Multiple Live Births   1         3      # Outcome Date GA Lbr Joel/2nd Weight Sex Delivery Anes PTL Lv   5 Current            4 Term 17 39w0d 00:38 / 00:05 6 lb 14.1 oz (3.12 kg) M Vag-Spont EPI N PALLAVI   3 SAB            2 Term 10/12/10 39w0d  7 lb 7 oz (3.374 kg) F Vag-Spont EPI N PALLAVI   1 Term 08 39w0d  7 lb 14 oz (3.572 kg) F Vag-Spont EPI N PALLAVI     Blood pressure 117/74, pulse 92, temperature 99.3 °F (37.4 °C), temperature source Oral, weight 142 lb 3.2 oz (64.5 kg), last menstrual period 2020, not currently breastfeeding. The patient was seen and evaluated. There was N/A fetal movements. No contractions or leakage of fluid. Signs and symptoms of  labor as well as labor were reviewed. Dates were reviewed with the patient. The physical exam was completed along with pap and cultures. Prenatal labs were drawn today. The patient will return to the office for her next visit in 1 weeks. See antepartum flow sheet. Patient Active Problem List    Diagnosis Date Noted    History of gestational diabetes mellitus (GDM) 2020    Marijuana use 2017     UDS + THC on 17  M Apg 8/8 Wt 6#14  2017    High risk pregnancy, multigravida of advanced maternal age in first trimester 2017        Diagnosis Orders   1. High risk pregnancy, multigravida of advanced maternal age in first trimester  VAGINITIS DNA PROBE   2. 12 weeks gestation of pregnancy  VAGINITIS DNA PROBE   3. Acute vaginitis  metroNIDAZOLE (FLAGYL) 500 MG tablet    fluconazole (DIFLUCAN) 100 MG tablet   Continue prenatal vitamins, increase water intake and frequent rest periods as needed.   Patient was given numbers to shan wise zepf for grief counseling     Electronically signed by: Keith Dawson CNP

## 2020-07-15 LAB
C TRACH DNA GENITAL QL NAA+PROBE: NEGATIVE
DIRECT EXAM: ABNORMAL
Lab: ABNORMAL
N. GONORRHOEAE DNA: NEGATIVE
SPECIMEN DESCRIPTION: ABNORMAL
SPECIMEN DESCRIPTION: NORMAL

## 2020-07-16 LAB
HPV SAMPLE: NORMAL
HPV, GENOTYPE 16: NOT DETECTED
HPV, GENOTYPE 18: NOT DETECTED
HPV, HIGH RISK OTHER: NOT DETECTED
HPV, INTERPRETATION: NORMAL
SPECIMEN DESCRIPTION: NORMAL

## 2020-07-17 LAB — CYTOLOGY REPORT: NORMAL

## 2020-07-21 ENCOUNTER — ROUTINE PRENATAL (OUTPATIENT)
Dept: OBGYN CLINIC | Age: 40
End: 2020-07-21
Payer: MEDICAID

## 2020-07-21 VITALS
HEART RATE: 86 BPM | WEIGHT: 142.8 LBS | SYSTOLIC BLOOD PRESSURE: 114 MMHG | DIASTOLIC BLOOD PRESSURE: 73 MMHG | TEMPERATURE: 99.7 F | BODY MASS INDEX: 26.12 KG/M2

## 2020-07-21 PROBLEM — Z36.89 ENCOUNTER FOR FETAL ANATOMIC SURVEY: Status: ACTIVE | Noted: 2020-07-21

## 2020-07-21 PROBLEM — Z36.89 ENCOUNTER FOR FETAL ANATOMIC SURVEY: Status: RESOLVED | Noted: 2020-07-21 | Resolved: 2020-07-21

## 2020-07-21 PROCEDURE — G8419 CALC BMI OUT NRM PARAM NOF/U: HCPCS | Performed by: SPECIALIST

## 2020-07-21 PROCEDURE — G8427 DOCREV CUR MEDS BY ELIG CLIN: HCPCS | Performed by: SPECIALIST

## 2020-07-21 PROCEDURE — 1036F TOBACCO NON-USER: CPT | Performed by: SPECIALIST

## 2020-07-21 PROCEDURE — 99213 OFFICE O/P EST LOW 20 MIN: CPT | Performed by: SPECIALIST

## 2020-07-21 NOTE — PROGRESS NOTES
Charbel Rojas is a 42 y.o. female 13w3d    [id number]    OB History    Para Term  AB Living   5 3 3   1 3   SAB TAB Ectopic Molar Multiple Live Births   1         3      # Outcome Date GA Lbr Joel/2nd Weight Sex Delivery Anes PTL Lv   5 Current            4 Term 17 39w0d 00:38 / 00:05 6 lb 14.1 oz (3.12 kg) M Vag-Spont EPI N PALLAVI   3 SAB            2 Term 10/12/10 39w0d  7 lb 7 oz (3.374 kg) F Vag-Spont EPI N PALLAVI   1 Term 08 39w0d  7 lb 14 oz (3.572 kg) F Vag-Spont EPI N PALLAVI       Chief Complaint   Patient presents with    Routine Prenatal Visit     Patient is 13 weeks and 3 days gestation and is here for supervision of high risk pregnancy    High Risk Pregnancy    Advanced Maternal Age            Blood pressure 114/73, pulse 86, temperature 99.7 °F (37.6 °C), temperature source Temporal, weight 142 lb 12.8 oz (64.8 kg), last menstrual period 2020, not currently breastfeeding. The patient was seen and evaluated. There was N/A fetal movements. No contractions or leakage of fluid. Signs and symptoms of  labor as well as labor were reviewed. Dates were reviewed with the patient. The patient will return to the office for her next visit in 1 week. See antepartum flow sheet. Patient Active Problem List    Diagnosis Date Noted    History of gestational diabetes mellitus (GDM) 2020    Marijuana use 2017     UDS + THC on 17      High risk pregnancy, multigravida of advanced maternal age in first trimester 2017        Diagnosis Orders   1. Encounter for fetal anatomic survey  US OB Transvaginal    US OB 14 Plus Weeks Single or First Gestation   2. High risk pregnancy, multigravida of advanced maternal age in first trimester         Patient doing well. Fetal heart rate auscultated at 146 bpm and fundal height was not done today due to early gestational age. Patient advised to continue taking prenatal vitamins and to rest as necessary.  A second trimester ultrasound and cervical length ultrasound were ordered today and patient was advised to schedule an appointment to have these done between 21 to 22 gestational weeks. Patient also reminded that novel coronavirus may infect her and the baby and to wear a mask at all time when she leaves her home. 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 Brenda Justice am scribing for, and in the presence of Dr. Mildred García. Electronically signed by: Lizzy Gongora 7/21/20 10:45 AM   I agree to the above documentation placed by my scribe Lizzy Gongora. I reviewed the scribe's note and agree with the documented findings and plan of care. Any areas of disagreement are noted on the chart. I have personally evaluated this patient. Additional findings are as noted. I agree with the chief complaint, past medical history, past surgical history, allergies, medications, social and family history as documented unless otherwise noted below.      Electronically signed by Mildred García MD on 7/22/2020 at 5:29 AM

## 2020-07-22 PROBLEM — Z3A.13 13 WEEKS GESTATION OF PREGNANCY: Status: ACTIVE | Noted: 2020-07-22

## 2020-07-28 ENCOUNTER — ROUTINE PRENATAL (OUTPATIENT)
Dept: OBGYN CLINIC | Age: 40
End: 2020-07-28
Payer: MEDICAID

## 2020-07-28 VITALS
DIASTOLIC BLOOD PRESSURE: 68 MMHG | HEART RATE: 82 BPM | TEMPERATURE: 99.7 F | WEIGHT: 144 LBS | BODY MASS INDEX: 26.5 KG/M2 | HEIGHT: 62 IN | SYSTOLIC BLOOD PRESSURE: 109 MMHG

## 2020-07-28 PROCEDURE — 1036F TOBACCO NON-USER: CPT | Performed by: CLINICAL NURSE SPECIALIST

## 2020-07-28 PROCEDURE — G8427 DOCREV CUR MEDS BY ELIG CLIN: HCPCS | Performed by: CLINICAL NURSE SPECIALIST

## 2020-07-28 PROCEDURE — G8419 CALC BMI OUT NRM PARAM NOF/U: HCPCS | Performed by: CLINICAL NURSE SPECIALIST

## 2020-07-28 PROCEDURE — 99213 OFFICE O/P EST LOW 20 MIN: CPT | Performed by: CLINICAL NURSE SPECIALIST

## 2020-07-28 NOTE — PROGRESS NOTES
Gustavo Soni is a 42 y.o. female 14w3d, reports occasional headaches mostly when she is working at the end of the day and states that she does not drink enough when working and feels it could be related to wearing a ponytail. Patient denies any visual disturbances or epigastric pain. [id number]    OB History    Para Term  AB Living   5 3 3   1 3   SAB TAB Ectopic Molar Multiple Live Births   1         3      # Outcome Date GA Lbr Joel/2nd Weight Sex Delivery Anes PTL Lv   5 Current            4 Term 17 39w0d 00:38 / 00:05 6 lb 14.1 oz (3.12 kg) M Vag-Spont EPI N PALLAVI   3 SAB            2 Term 10/12/10 39w0d  7 lb 7 oz (3.374 kg) F Vag-Spont EPI N PALLAVI   1 Term 08 39w0d  7 lb 14 oz (3.572 kg) F Vag-Spont EPI N PALLAVI       Blood pressure 109/68, pulse 82, temperature 99.7 °F (37.6 °C), temperature source Temporal, height 5' 2\" (1.575 m), weight 144 lb (65.3 kg), last menstrual period 2020, not currently breastfeeding. The patient was seen and evaluated. There was Positive fetal movements. No contractions or leakage of fluid. Signs and symptoms of  labor as well as labor were reviewed. A Quad Screen was ordered for a 15-20 week gestational age window. Dates were reviewed with the patient. An 18-22 week anatomy ultrasound has been ordered. The patient will return to the office for her next visit in 1 weeks. See antepartum flow sheet. Patient Active Problem List    Diagnosis Date Noted    13 weeks gestation of pregnancy 2020    History of gestational diabetes mellitus (GDM) 2020    Marijuana use 2017     UDS + THC on 17      High risk pregnancy, multigravida of advanced maternal age in first trimester 2017        Diagnosis Orders   1. Encounter for supervision of high risk pregnancy in second trimester, antepartum     2. High risk pregnancy, multigravida of advanced maternal age in second trimester     3.  History of gestational diabetes mellitus (GDM)     4. 14 weeks gestation of pregnancy     Continue prenatal vitamins, increase water intake and frequent rest periods as needed.     Electronically signed by: Abdullahi Smith CNP

## 2020-08-04 ENCOUNTER — ROUTINE PRENATAL (OUTPATIENT)
Dept: OBGYN CLINIC | Age: 40
End: 2020-08-04
Payer: MEDICAID

## 2020-08-04 VITALS
WEIGHT: 145 LBS | HEIGHT: 62 IN | TEMPERATURE: 98.8 F | BODY MASS INDEX: 26.68 KG/M2 | DIASTOLIC BLOOD PRESSURE: 66 MMHG | SYSTOLIC BLOOD PRESSURE: 106 MMHG | HEART RATE: 82 BPM

## 2020-08-04 PROBLEM — O09.92 ENCOUNTER FOR SUPERVISION OF HIGH RISK PREGNANCY IN SECOND TRIMESTER, ANTEPARTUM: Status: ACTIVE | Noted: 2017-07-11

## 2020-08-04 PROCEDURE — 99213 OFFICE O/P EST LOW 20 MIN: CPT | Performed by: CLINICAL NURSE SPECIALIST

## 2020-08-04 PROCEDURE — G8427 DOCREV CUR MEDS BY ELIG CLIN: HCPCS | Performed by: CLINICAL NURSE SPECIALIST

## 2020-08-04 PROCEDURE — G8419 CALC BMI OUT NRM PARAM NOF/U: HCPCS | Performed by: CLINICAL NURSE SPECIALIST

## 2020-08-04 PROCEDURE — 1036F TOBACCO NON-USER: CPT | Performed by: CLINICAL NURSE SPECIALIST

## 2020-08-04 NOTE — PROGRESS NOTES
Primus Mark is a 42 y.o. female 15w3d, doing well patient reports that no more headaches she is using a clip in her hair instead a ponytail armos and not making her surgical cap as tight and that has helped. [id number]    OB History    Para Term  AB Living   5 3 3   1 3   SAB TAB Ectopic Molar Multiple Live Births   1         3      # Outcome Date GA Lbr Joel/2nd Weight Sex Delivery Anes PTL Lv   5 Current            4 Term 17 39w0d 00:38 / 00:05 6 lb 14.1 oz (3.12 kg) M Vag-Spont EPI N PALLAVI   3 SAB            2 Term 10/12/10 39w0d  7 lb 7 oz (3.374 kg) F Vag-Spont EPI N PALLAVI   1 Term 08 39w0d  7 lb 14 oz (3.572 kg) F Vag-Spont EPI N PALLAVI       Blood pressure 106/66, pulse 82, temperature 98.8 °F (37.1 °C), temperature source Temporal, height 5' 2\" (1.575 m), weight 145 lb (65.8 kg), last menstrual period 2020, not currently breastfeeding. The patient was seen and evaluated. There was Positive fetal movements. No contractions or leakage of fluid. Signs and symptoms of  labor as well as labor were reviewed. A Quad Screen was ordered for a 15-20 week gestational age window. Dates were reviewed with the patient. An 18-22 week anatomy ultrasound has been ordered. The patient will return to the office for her next visit in 1 weeks. See antepartum flow sheet. Patient Active Problem List    Diagnosis Date Noted    13 weeks gestation of pregnancy 2020    History of gestational diabetes mellitus (GDM) 2020    Marijuana use 2017     UDS + THC on 17      Encounter for supervision of high risk pregnancy in second trimester, antepartum 2017        Diagnosis Orders   1. Encounter for supervision of high risk pregnancy in second trimester, antepartum     2. 15 weeks gestation of pregnancy     3.  High risk pregnancy, multigravida of advanced maternal age in second trimester     Continue prenatal vitamins, increase water intake and frequent rest periods as needed.       Electronically signed by: Veronica Kay CNP

## 2020-08-11 ENCOUNTER — HOSPITAL ENCOUNTER (OUTPATIENT)
Age: 40
Setting detail: SPECIMEN
Discharge: HOME | End: 2020-08-11
Payer: MEDICAID

## 2020-08-11 ENCOUNTER — ROUTINE PRENATAL (OUTPATIENT)
Dept: OBGYN CLINIC | Age: 40
End: 2020-08-11
Payer: MEDICAID

## 2020-08-11 VITALS
DIASTOLIC BLOOD PRESSURE: 68 MMHG | BODY MASS INDEX: 26.7 KG/M2 | SYSTOLIC BLOOD PRESSURE: 106 MMHG | WEIGHT: 146 LBS | TEMPERATURE: 99.5 F | HEART RATE: 85 BPM

## 2020-08-11 PROBLEM — O09.522 MULTIGRAVIDA OF ADVANCED MATERNAL AGE IN SECOND TRIMESTER: Status: ACTIVE | Noted: 2020-08-11

## 2020-08-11 PROCEDURE — 99213 OFFICE O/P EST LOW 20 MIN: CPT | Performed by: SPECIALIST

## 2020-08-11 PROCEDURE — 1036F TOBACCO NON-USER: CPT | Performed by: SPECIALIST

## 2020-08-11 PROCEDURE — G8419 CALC BMI OUT NRM PARAM NOF/U: HCPCS | Performed by: SPECIALIST

## 2020-08-11 PROCEDURE — G8427 DOCREV CUR MEDS BY ELIG CLIN: HCPCS | Performed by: SPECIALIST

## 2020-08-11 PROCEDURE — 36415 COLL VENOUS BLD VENIPUNCTURE: CPT | Performed by: SPECIALIST

## 2020-08-11 NOTE — PROGRESS NOTES
Patient was in the office today for a Maternal Quad screen. Draw per physician order using sterile technique.   Drawn from the Right antecubital.
height was not measured today due to early gestational age. She had the quad screen drawn today. Patient advised to continue taking prenatal vitamins and to rest as necessary. Patient states she is avoiding crowds due to the risk of Covid-19. Kirstie Grossman am scribing for, and in the presence of Dr. Torito Yanez. Electronically signed by: Neto Sams 8/11/20 10:40 AM   I agree to the above documentation placed by my scribe Neto Sams. I reviewed the scribe's note and agree with the documented findings and plan of care. Any areas of disagreement are noted on the chart. I have personally evaluated this patient. Additional findings are as noted. I agree with the chief complaint, past medical history, past surgical history, allergies, medications, social and family history as documented unless otherwise noted below.      Electronically signed by Torito Yanez MD on 8/12/2020 at 5:12 AM

## 2020-08-14 LAB
AFP INTERPRETATION: NORMAL
AFP MOM: 0.68
AFP QUAD INTERPRETATION: NORMAL
AFP SPECIMEN: NORMAL
AFP: 24 NG/ML
DATE OF BIRTH: NORMAL
DATING METHOD: NORMAL
DETERMINED BY: NORMAL
DIABETIC: NEGATIVE
DIMERIC INHIBIN A: 120 PG/ML
DUE DATE: NORMAL
ESTIMATED DUE DATE: NORMAL
FAMILY HISTORY NTD: NEGATIVE
GESTATIONAL AGE: NORMAL
HISTORY OF ANEUPLOIDY?: NORMAL
IN VITRO FERTILIZATION: NORMAL
INHIBIN A MOM: 0.71
INSULIN REQ DIABETES: NO
LAST MENSTRUAL PERIOD: NORMAL
MATERNAL AGE AT EDD: 38.1 YR
MATERNAL WEIGHT: 146
MOM FOR HCG, 2ND TRIMESTER: 0.37
MONOCHORIONIC TWINS: NORMAL
NUMBER OF FETUSES: NORMAL
PATIENT WEIGHT UNITS: NORMAL
PATIENT WEIGHT: NORMAL
PATIENT'S HCG, TRI 2: NORMAL IU/L
PREV TRISOMY PREG: NORMAL
RACE (MATERNAL): NORMAL
RACE: NORMAL
REPEAT SPECIMEN?: NORMAL
SMOKING: NORMAL
SMOKING: NORMAL
UE3 MOM: 0.83
UE3 VALUE: 0.92 NG/ML
VALPROIC/CARBAMAZEP: NORMAL
ZZ NTE CLEAN UP: HISTORY: NO

## 2020-08-18 ENCOUNTER — ROUTINE PRENATAL (OUTPATIENT)
Dept: OBGYN CLINIC | Age: 40
End: 2020-08-18
Payer: MEDICAID

## 2020-08-18 VITALS
DIASTOLIC BLOOD PRESSURE: 78 MMHG | HEIGHT: 62 IN | SYSTOLIC BLOOD PRESSURE: 115 MMHG | BODY MASS INDEX: 27.05 KG/M2 | TEMPERATURE: 99.7 F | HEART RATE: 91 BPM | WEIGHT: 147 LBS

## 2020-08-18 PROCEDURE — G8419 CALC BMI OUT NRM PARAM NOF/U: HCPCS | Performed by: CLINICAL NURSE SPECIALIST

## 2020-08-18 PROCEDURE — 1036F TOBACCO NON-USER: CPT | Performed by: CLINICAL NURSE SPECIALIST

## 2020-08-18 PROCEDURE — G8427 DOCREV CUR MEDS BY ELIG CLIN: HCPCS | Performed by: CLINICAL NURSE SPECIALIST

## 2020-08-18 PROCEDURE — 99213 OFFICE O/P EST LOW 20 MIN: CPT | Performed by: CLINICAL NURSE SPECIALIST

## 2020-08-18 NOTE — PROGRESS NOTES
vitamins, increase water intake and frequent rest periods as needed. Patient was offered physical therapy or LSO but she declined today but is aware that if she changes her mind the referral for either can be made.    T 162    Electronically signed by: Isidoro Ortega CNP

## 2020-08-25 ENCOUNTER — ROUTINE PRENATAL (OUTPATIENT)
Dept: OBGYN CLINIC | Age: 40
End: 2020-08-25
Payer: MEDICAID

## 2020-08-25 VITALS
WEIGHT: 148 LBS | SYSTOLIC BLOOD PRESSURE: 116 MMHG | HEART RATE: 77 BPM | BODY MASS INDEX: 27.23 KG/M2 | DIASTOLIC BLOOD PRESSURE: 69 MMHG | HEIGHT: 62 IN | TEMPERATURE: 98.1 F

## 2020-08-25 PROBLEM — Z3A.13 13 WEEKS GESTATION OF PREGNANCY: Status: RESOLVED | Noted: 2020-07-22 | Resolved: 2020-08-25

## 2020-08-25 PROBLEM — Z3A.18 18 WEEKS GESTATION OF PREGNANCY: Status: ACTIVE | Noted: 2020-08-25

## 2020-08-25 PROCEDURE — G8419 CALC BMI OUT NRM PARAM NOF/U: HCPCS | Performed by: SPECIALIST

## 2020-08-25 PROCEDURE — 1036F TOBACCO NON-USER: CPT | Performed by: SPECIALIST

## 2020-08-25 PROCEDURE — 99213 OFFICE O/P EST LOW 20 MIN: CPT | Performed by: SPECIALIST

## 2020-08-25 PROCEDURE — G8427 DOCREV CUR MEDS BY ELIG CLIN: HCPCS | Performed by: SPECIALIST

## 2020-08-25 NOTE — PROGRESS NOTES
Gustavo Soni is a 42 y.o. female 18w3d    [id number]    OB History    Para Term  AB Living   5 3 3   1 3   SAB TAB Ectopic Molar Multiple Live Births   1         3      # Outcome Date GA Lbr Joel/2nd Weight Sex Delivery Anes PTL Lv   5 Current            4 Term 17 39w0d 00:38 / 00:05 6 lb 14.1 oz (3.12 kg) M Vag-Spont EPI N PALLAVI   3 SAB            2 Term 10/12/10 39w0d  7 lb 7 oz (3.374 kg) F Vag-Spont EPI N PALLAVI   1 Term 08 39w0d  7 lb 14 oz (3.572 kg) F Vag-Spont EPI N PALLAVI       Chief Complaint   Patient presents with    Routine Prenatal Visit            Blood pressure 116/69, pulse 77, temperature 98.1 °F (36.7 °C), temperature source Temporal, height 5' 2\" (1.575 m), weight 148 lb (67.1 kg), last menstrual period 2020, not currently breastfeeding. The patient was seen and evaluated. There was Positive fetal movements. No contractions or leakage of fluid. Signs and symptoms of  labor as well as labor were reviewed. Dates were reviewed with the patient. The patient will return to the office for her next visit in 1 week. See antepartum flow sheet. Patient Active Problem List    Diagnosis Date Noted    18 weeks gestation of pregnancy 2020   Cecillia Due of advanced maternal age in second trimester 2020    History of gestational diabetes mellitus (GDM) 2020    Marijuana use 2017     UDS + THC on 17      Encounter for supervision of high risk pregnancy in second trimester, antepartum 2017        Diagnosis Orders   1. 18 weeks gestation of pregnancy     2. Multigravida of advanced maternal age in second trimester     3. Encounter for supervision of high risk pregnancy in second trimester, antepartum         Patient doing well. She reports more fetal movement recently. Fetal heart rate auscultated at 146 bpm and fundal height is 18 cm. today. Patient advised to continue taking prenatal vitamins and to rest as necessary.   Patient reminded to stay away from crowds and to wear a mask when outside her home. Addie Stacy am scribing for, and in the presence of Dr. Roman Tomlinson. Electronically signed by: Loretta Riedel 8/25/20 9:46 AM   I agree to the above documentation placed by my scribe Loretta Riedel. I reviewed the scribe's note and agree with the documented findings and plan of care. Any areas of disagreement are noted on the chart. I have personally evaluated this patient. Additional findings are as noted. I agree with the chief complaint, past medical history, past surgical history, allergies, medications, social and family history as documented unless otherwise noted below.      Electronically signed by Roman Tomlinson MD on 8/26/2020 at 5:17 AM

## 2020-09-01 ENCOUNTER — ROUTINE PRENATAL (OUTPATIENT)
Dept: OBGYN CLINIC | Age: 40
End: 2020-09-01
Payer: MEDICAID

## 2020-09-01 VITALS
SYSTOLIC BLOOD PRESSURE: 111 MMHG | TEMPERATURE: 97.3 F | DIASTOLIC BLOOD PRESSURE: 76 MMHG | BODY MASS INDEX: 27.22 KG/M2 | WEIGHT: 148.8 LBS | HEART RATE: 86 BPM

## 2020-09-01 PROBLEM — Z3A.19 19 WEEKS GESTATION OF PREGNANCY: Status: ACTIVE | Noted: 2020-09-01

## 2020-09-01 PROCEDURE — G8427 DOCREV CUR MEDS BY ELIG CLIN: HCPCS | Performed by: SPECIALIST

## 2020-09-01 PROCEDURE — 99213 OFFICE O/P EST LOW 20 MIN: CPT | Performed by: SPECIALIST

## 2020-09-01 PROCEDURE — 1036F TOBACCO NON-USER: CPT | Performed by: SPECIALIST

## 2020-09-01 PROCEDURE — G8419 CALC BMI OUT NRM PARAM NOF/U: HCPCS | Performed by: SPECIALIST

## 2020-09-01 NOTE — PROGRESS NOTES
Mirna Gauthier is a 42 y.o. female 19w3d    [id number]    OB History    Para Term  AB Living   5 3 3   1 3   SAB TAB Ectopic Molar Multiple Live Births   1         3      # Outcome Date GA Lbr Joel/2nd Weight Sex Delivery Anes PTL Lv   5 Current            4 Term 17 39w0d 00:38 / 00:05 6 lb 14.1 oz (3.12 kg) M Vag-Spont EPI N PALLAVI   3 SAB            2 Term 10/12/10 39w0d  7 lb 7 oz (3.374 kg) F Vag-Spont EPI N PALLAVI   1 Term 08 39w0d  7 lb 14 oz (3.572 kg) F Vag-Spont EPI N PALLAVI       Chief Complaint   Patient presents with    Routine Prenatal Visit     Patient is 19 weeks and 3 days gestation and is here for supervision of high risk pregnancy.  High Risk Pregnancy    Advanced Maternal Age        Blood pressure 111/76, pulse 86, temperature 97.3 °F (36.3 °C), temperature source Temporal, weight 148 lb 12.8 oz (67.5 kg), last menstrual period 2020, not currently breastfeeding. The patient was seen and evaluated. There was positive fetal movements. No contractions or leakage of fluid. Signs and symptoms of  labor as well as labor were reviewed. The patient's anatomy ultrasound has been scheduled. The S/S of Pre-Eclampsia were reviewed with the patient in detail. She is to report any of these if they occur. She currently denies any of these. The patient is RH negative Rhogam Ordered: Not due yet. Patient Active Problem List    Diagnosis Date Noted    19 weeks gestation of pregnancy 2020    18 weeks gestation of pregnancy 2020   Kathryn Paulson of advanced maternal age in second trimester 2020    History of gestational diabetes mellitus (GDM) 2020    Marijuana use 2017     UDS + THC on 17      Encounter for supervision of high risk pregnancy in second trimester, antepartum 2017        Diagnosis Orders   1. 19 weeks gestation of pregnancy     2. Multigravida of advanced maternal age in second trimester     3.  Encounter for supervision of high risk pregnancy in second trimester, antepartum         Patient doing well. Fetal heart rate auscultated at 150 bpm and fundal height is 19 cm. today. Patient advised to continue taking prenatal vitamins and to rest as necessary. The patient will return to the office for her next visit in 1 week. See antepartum flow sheet. Peyton Curling, am scribing for, and in the presence of Dr. Iain Huggins. Electronically signed by: Chiquis Rome 9/1/20 9:53 AM   I agree to the above documentation placed by my scribe Chiquis Rome. I reviewed the scribe's note and agree with the documented findings and plan of care. Any areas of disagreement are noted on the chart. I have personally evaluated this patient. Additional findings are as noted. I agree with the chief complaint, past medical history, past surgical history, allergies, medications, social and family history as documented unless otherwise noted below.      Electronically signed by Iain Huggins MD on 9/2/2020 at 2:31 AM

## 2020-09-08 ENCOUNTER — ROUTINE PRENATAL (OUTPATIENT)
Dept: OBGYN CLINIC | Age: 40
End: 2020-09-08
Payer: MEDICAID

## 2020-09-08 VITALS
BODY MASS INDEX: 27.44 KG/M2 | HEART RATE: 88 BPM | DIASTOLIC BLOOD PRESSURE: 72 MMHG | SYSTOLIC BLOOD PRESSURE: 124 MMHG | WEIGHT: 150 LBS

## 2020-09-08 PROBLEM — Z3A.20 20 WEEKS GESTATION OF PREGNANCY: Status: ACTIVE | Noted: 2020-09-08

## 2020-09-08 PROCEDURE — G8419 CALC BMI OUT NRM PARAM NOF/U: HCPCS | Performed by: SPECIALIST

## 2020-09-08 PROCEDURE — 1036F TOBACCO NON-USER: CPT | Performed by: SPECIALIST

## 2020-09-08 PROCEDURE — G8427 DOCREV CUR MEDS BY ELIG CLIN: HCPCS | Performed by: SPECIALIST

## 2020-09-08 PROCEDURE — 99213 OFFICE O/P EST LOW 20 MIN: CPT | Performed by: SPECIALIST

## 2020-09-08 NOTE — PROGRESS NOTES
wears it. Patient was warned about the risk of Covid-19 and that is could be serious in a pregnant woman and it is known to affect both the baby and mother. Fetal heart rate auscultated at 147 bpm and fundal height is 20 cm. today. Patient advised to continue taking prenatal vitamins and to rest as necessary. The patient will return to the office for her next visit in 1 week. See antepartum flow sheet. Alex Perry am scribing for, and in the presence of Dr. Rabia Henderson. Electronically signed by: Soniya Love 9/8/20 10:08 AM   I agree to the above documentation placed by my scribe Soniya Love. I reviewed the scribe's note and agree with the documented findings and plan of care. Any areas of disagreement are noted on the chart. I have personally evaluated this patient. Additional findings are as noted. I agree with the chief complaint, past medical history, past surgical history, allergies, medications, social and family history as documented unless otherwise noted below.      Electronically signed by Rabia Henderson MD on 9/9/2020 at 5:19 AM

## 2020-09-14 ENCOUNTER — ROUTINE PRENATAL (OUTPATIENT)
Dept: OBGYN CLINIC | Age: 40
End: 2020-09-14
Payer: MEDICAID

## 2020-09-14 VITALS
DIASTOLIC BLOOD PRESSURE: 67 MMHG | WEIGHT: 151 LBS | HEART RATE: 78 BPM | TEMPERATURE: 98.6 F | BODY MASS INDEX: 27.62 KG/M2 | SYSTOLIC BLOOD PRESSURE: 110 MMHG

## 2020-09-14 PROBLEM — Z3A.19 19 WEEKS GESTATION OF PREGNANCY: Status: RESOLVED | Noted: 2020-09-01 | Resolved: 2020-09-14

## 2020-09-14 PROBLEM — Z3A.18 18 WEEKS GESTATION OF PREGNANCY: Status: RESOLVED | Noted: 2020-08-25 | Resolved: 2020-09-14

## 2020-09-14 PROCEDURE — G8427 DOCREV CUR MEDS BY ELIG CLIN: HCPCS | Performed by: SPECIALIST

## 2020-09-14 PROCEDURE — 1036F TOBACCO NON-USER: CPT | Performed by: SPECIALIST

## 2020-09-14 PROCEDURE — 99213 OFFICE O/P EST LOW 20 MIN: CPT | Performed by: SPECIALIST

## 2020-09-14 PROCEDURE — G8419 CALC BMI OUT NRM PARAM NOF/U: HCPCS | Performed by: SPECIALIST

## 2020-09-19 NOTE — PROGRESS NOTES
Mirna Gauthier is a 42 y.o. female 21w2d    [id number]    OB History    Para Term  AB Living   5 3 3   1 3   SAB TAB Ectopic Molar Multiple Live Births   1         3      # Outcome Date GA Lbr Joel/2nd Weight Sex Delivery Anes PTL Lv   5 Current            4 Term 17 39w0d 00:38 / 00:05 6 lb 14.1 oz (3.12 kg) M Vag-Spont EPI N PALLAVI   3 SAB            2 Term 10/12/10 39w0d  7 lb 7 oz (3.374 kg) F Vag-Spont EPI N PALLAVI   1 Term 08 39w0d  7 lb 14 oz (3.572 kg) F Vag-Spont EPI N PALLAVI       Chief Complaint   Patient presents with    Routine Prenatal Visit        Blood pressure 110/67, pulse 78, temperature 98.6 °F (37 °C), temperature source Temporal, weight 151 lb (68.5 kg), last menstrual period 2020, not currently breastfeeding. The patient was seen and evaluated. There was positive fetal movements. No contractions or leakage of fluid. Signs and symptoms of  labor as well as labor were reviewed. The patient's anatomy ultrasound has been completed today. The S/S of Pre-Eclampsia were reviewed with the patient in detail. She is to report any of these if they occur. She currently denies any of these. The patient is RH negative Rhogam Ordered: Not due yet. Patient Active Problem List    Diagnosis Date Noted    20 weeks gestation of pregnancy 2020   Kathryn Paulson of advanced maternal age in second trimester 2020    History of gestational diabetes mellitus (GDM) 2020    Marijuana use 2017     UDS + THC on 17      HRP (high risk pregnancy), second trimester 2017        Diagnosis Orders   1. 21 weeks gestation of pregnancy     2. Multigravida of advanced maternal age in second trimester     3. HRP (high risk pregnancy), second trimester         Patient doing well. Fetal heart rate auscultated at 158 bpm and fundal height is 22 cm. today. Patient advised to continue taking prenatal vitamins and to rest as necessary.      The patient will return to the office for her next visit in 1 week. See antepartum flow sheet. Sowmya Alcantar am scribing for, and in the presence of Dr. Katiana Araujo. Electronically signed by: Marily Laura 9/20/20 10:04 AM   I agree to the above documentation placed by my scribe Marily Laura. I reviewed the scribe's note and agree with the documented findings and plan of care. Any areas of disagreement are noted on the chart. I have personally evaluated this patient. Additional findings are as noted. I agree with the chief complaint, past medical history, past surgical history, allergies, medications, social and family history as documented unless otherwise noted below.      Electronically signed by Katiana Araujo MD on 9/20/2020 at 1:19 PM

## 2020-09-20 PROBLEM — Z3A.21 21 WEEKS GESTATION OF PREGNANCY: Status: ACTIVE | Noted: 2020-09-20

## 2020-09-21 ENCOUNTER — ROUTINE PRENATAL (OUTPATIENT)
Dept: OBGYN CLINIC | Age: 40
End: 2020-09-21
Payer: MEDICAID

## 2020-09-21 VITALS — DIASTOLIC BLOOD PRESSURE: 70 MMHG | HEART RATE: 88 BPM | SYSTOLIC BLOOD PRESSURE: 110 MMHG | TEMPERATURE: 98.9 F

## 2020-09-21 PROCEDURE — 99213 OFFICE O/P EST LOW 20 MIN: CPT | Performed by: CLINICAL NURSE SPECIALIST

## 2020-09-21 PROCEDURE — 90471 IMMUNIZATION ADMIN: CPT | Performed by: CLINICAL NURSE SPECIALIST

## 2020-09-21 PROCEDURE — G8419 CALC BMI OUT NRM PARAM NOF/U: HCPCS | Performed by: CLINICAL NURSE SPECIALIST

## 2020-09-21 PROCEDURE — G8427 DOCREV CUR MEDS BY ELIG CLIN: HCPCS | Performed by: CLINICAL NURSE SPECIALIST

## 2020-09-21 PROCEDURE — 1036F TOBACCO NON-USER: CPT | Performed by: CLINICAL NURSE SPECIALIST

## 2020-09-21 NOTE — PROGRESS NOTES
Patient was given FLU in the Left Deltoid. NDC# [id number]  LOT# [id number]  Exp date- 6/30/2021  Patient's last injection was n/a  Patient's last annual exam was on n/a  Patient tolerated well without difficulty.

## 2020-09-21 NOTE — PROGRESS NOTES
Steven Cid is a 42 y.o. female 22w2d, complains of round ligament pain intermittently. [id number]    OB History    Para Term  AB Living   5 3 3   1 3   SAB TAB Ectopic Molar Multiple Live Births   1         3      # Outcome Date GA Lbr Joel/2nd Weight Sex Delivery Anes PTL Lv   5 Current            4 Term 17 39w0d 00:38 / 00:05 6 lb 14.1 oz (3.12 kg) M Vag-Spont EPI N PALLAVI   3 SAB            2 Term 10/12/10 39w0d  7 lb 7 oz (3.374 kg) F Vag-Spont EPI N PALLAVI   1 Term 08 39w0d  7 lb 14 oz (3.572 kg) F Vag-Spont EPI N PALLAVI         Blood pressure 110/70, pulse 88, temperature 98.9 °F (37.2 °C), temperature source Temporal, last menstrual period 2020, not currently breastfeeding. The patient was seen and evaluated. There was positive fetal movements. No contractions or leakage of fluid. Signs and symptoms of  labor as well as labor were reviewed. The patients anatomy ultrasound has been completed and reviewed with patient. A 28 week lab panel was ordered. This includes a (CBC, 1 hr GTT). The patient is to complete this in the next two to four weeks. The S/S of Pre-Eclampsia were reviewed with the patient in detail. She is to report any of these if they occur. She currently denies any of these. The patient is RH negative Rhogam Ordered: Not due at this time. Patient Active Problem List    Diagnosis Date Noted    21 weeks gestation of pregnancy 2020    20 weeks gestation of pregnancy 2020   Tevin Rader of advanced maternal age in second trimester 2020    History of gestational diabetes mellitus (GDM) 2020    Marijuana use 2017     UDS + THC on 17      HRP (high risk pregnancy), second trimester 2017        Diagnosis Orders   1. HRP (high risk pregnancy), second trimester     2.  Multigravida of advanced maternal age in second trimester     3. 22 weeks gestation of pregnancy     Continue prenatal vitamins, increase water intake and frequent rest periods as needed. Patient signed consent for flu vaccine. The patient will return to the office for her next visit in 1 weeks. See antepartum flow sheet.      Electronically signed by: Mendoza Schulz CNP

## 2020-09-25 ENCOUNTER — IMMUNIZATION (OUTPATIENT)
Dept: OBGYN CLINIC | Age: 40
End: 2020-09-25

## 2020-09-25 PROCEDURE — 90471 IMMUNIZATION ADMIN: CPT | Performed by: CLINICAL NURSE SPECIALIST

## 2020-09-25 PROCEDURE — 90688 IIV4 VACCINE SPLT 0.5 ML IM: CPT | Performed by: CLINICAL NURSE SPECIALIST

## 2020-09-28 ENCOUNTER — ROUTINE PRENATAL (OUTPATIENT)
Dept: OBGYN CLINIC | Age: 40
End: 2020-09-28
Payer: MEDICAID

## 2020-09-28 VITALS
DIASTOLIC BLOOD PRESSURE: 85 MMHG | SYSTOLIC BLOOD PRESSURE: 131 MMHG | TEMPERATURE: 98.4 F | BODY MASS INDEX: 28.13 KG/M2 | WEIGHT: 153.8 LBS | HEART RATE: 85 BPM

## 2020-09-28 PROBLEM — O26.892 HEARTBURN DURING PREGNANCY IN SECOND TRIMESTER: Status: ACTIVE | Noted: 2020-09-28

## 2020-09-28 PROBLEM — R12 HEARTBURN DURING PREGNANCY IN SECOND TRIMESTER: Status: ACTIVE | Noted: 2020-09-28

## 2020-09-28 PROBLEM — Z3A.23 23 WEEKS GESTATION OF PREGNANCY: Status: ACTIVE | Noted: 2020-09-28

## 2020-09-28 PROCEDURE — G8419 CALC BMI OUT NRM PARAM NOF/U: HCPCS | Performed by: SPECIALIST

## 2020-09-28 PROCEDURE — G8427 DOCREV CUR MEDS BY ELIG CLIN: HCPCS | Performed by: SPECIALIST

## 2020-09-28 PROCEDURE — 1036F TOBACCO NON-USER: CPT | Performed by: SPECIALIST

## 2020-09-28 PROCEDURE — 99213 OFFICE O/P EST LOW 20 MIN: CPT | Performed by: SPECIALIST

## 2020-09-28 RX ORDER — FAMOTIDINE 20 MG/1
20 TABLET, FILM COATED ORAL 2 TIMES DAILY
Qty: 60 TABLET | Refills: 3 | Status: SHIPPED | OUTPATIENT
Start: 2020-09-28 | End: 2021-02-02

## 2020-09-28 NOTE — PROGRESS NOTES
Nola Barber is a 42 y.o. female 23w2d    [id number]    OB History    Para Term  AB Living   5 3 3   1 3   SAB TAB Ectopic Molar Multiple Live Births   1         3      # Outcome Date GA Lbr Joel/2nd Weight Sex Delivery Anes PTL Lv   5 Current            4 Term 17 39w0d 00:38 / 00:05 6 lb 14.1 oz (3.12 kg) M Vag-Spont EPI N PALLAVI   3 SAB            2 Term 10/12/10 39w0d  7 lb 7 oz (3.374 kg) F Vag-Spont EPI N PALLAVI   1 Term 08 39w0d  7 lb 14 oz (3.572 kg) F Vag-Spont EPI N PALLAVI       Chief Complaint   Patient presents with    Routine Prenatal Visit     Patient is 23 weeks and 2 days gestation and is here for supervision of high risk pregnancy.  High Risk Pregnancy    Advanced Maternal Age        Blood pressure 131/85, pulse 85, temperature 98.4 °F (36.9 °C), temperature source Temporal, weight 153 lb 12.8 oz (69.8 kg), last menstrual period 2020, not currently breastfeeding. The patient was seen and evaluated. There was positive fetal movements. No contractions or leakage of fluid. Signs and symptoms of  labor as well as labor were reviewed. The patient's anatomy ultrasound has been completed and reviewed with patient. The S/S of Pre-Eclampsia were reviewed with the patient in detail. She is to report any of these if they occur. She currently denies any of these.     The patient is RH negative Rhogam Ordered: Not due yet    Patient Active Problem List    Diagnosis Date Noted    Heartburn during pregnancy in second trimester 2020    23 weeks gestation of pregnancy 2020    21 weeks gestation of pregnancy 2020    20 weeks gestation of pregnancy 2020   [place] of advanced maternal age in second trimester 2020    History of gestational diabetes mellitus (GDM) 2020    Marijuana use 2017     UDS + THC on 17      HRP (high risk pregnancy), second trimester 2017        Diagnosis Orders   1. 23 weeks gestation of pregnancy  famotidine (PEPCID) 20 MG tablet   2. Heartburn during pregnancy in second trimester  famotidine (PEPCID) 20 MG tablet   3. Multigravida of advanced maternal age in second trimester     4. HRP (high risk pregnancy), second trimester       Orders Placed This Encounter   Medications    famotidine (PEPCID) 20 MG tablet     Sig: Take 1 tablet by mouth 2 times daily     Dispense:  60 tablet     Refill:  3        Patient complains of heartburn, otherwise doing well. Will treat with Pepcid. Fetal heart rate auscultated at 152 bpm and fundal height is 23 cm. today. Patient advised to continue taking prenatal vitamins and to rest as necessary. The patient will return to the office for her next visit in 1 week. See antepartum flow sheet. Chas Casanova am scribing for, and in the presence of Dr. Lilia Downs. Electronically signed by: Jyoti Luna 9/28/20 10:14 AM   I agree to the above documentation placed by my scribe Jyoti Luna. I reviewed the scribe's note and agree with the documented findings and plan of care. Any areas of disagreement are noted on the chart. I have personally evaluated this patient. Additional findings are as noted. I agree with the chief complaint, past medical history, past surgical history, allergies, medications, social and family history as documented unless otherwise noted below.      Electronically signed by Lilia Downs MD on 9/28/2020 at 3:50 PM

## 2020-10-05 ENCOUNTER — ROUTINE PRENATAL (OUTPATIENT)
Dept: OBGYN CLINIC | Age: 40
End: 2020-10-05
Payer: MEDICAID

## 2020-10-05 VITALS
SYSTOLIC BLOOD PRESSURE: 120 MMHG | WEIGHT: 157 LBS | DIASTOLIC BLOOD PRESSURE: 78 MMHG | HEART RATE: 84 BPM | BODY MASS INDEX: 28.72 KG/M2

## 2020-10-05 PROCEDURE — G8419 CALC BMI OUT NRM PARAM NOF/U: HCPCS | Performed by: CLINICAL NURSE SPECIALIST

## 2020-10-05 PROCEDURE — G8482 FLU IMMUNIZE ORDER/ADMIN: HCPCS | Performed by: CLINICAL NURSE SPECIALIST

## 2020-10-05 PROCEDURE — 99213 OFFICE O/P EST LOW 20 MIN: CPT | Performed by: CLINICAL NURSE SPECIALIST

## 2020-10-05 PROCEDURE — G8427 DOCREV CUR MEDS BY ELIG CLIN: HCPCS | Performed by: CLINICAL NURSE SPECIALIST

## 2020-10-05 PROCEDURE — 1036F TOBACCO NON-USER: CPT | Performed by: CLINICAL NURSE SPECIALIST

## 2020-10-05 NOTE — PROGRESS NOTES
Melissa Rueda is a 42 y.o. female 24w2d, doing well        OB History    Para Term  AB Living   5 3 3   1 3   SAB TAB Ectopic Molar Multiple Live Births   1         3      # Outcome Date GA Lbr Joel/2nd Weight Sex Delivery Anes PTL Lv   5 Current            4 Term 17 39w0d 00:38 / 00:05 6 lb 14.1 oz (3.12 kg) M Vag-Spont EPI N PALLAVI   3 SAB            2 Term 10/12/10 39w0d  7 lb 7 oz (3.374 kg) F Vag-Spont EPI N PALLAVI   1 Term 08 39w0d  7 lb 14 oz (3.572 kg) F Vag-Spont EPI N PALLAVI         Blood pressure 120/78, pulse 84, weight 157 lb (71.2 kg), last menstrual period 2020, not currently breastfeeding. The patient was seen and evaluated. There was positive fetal movements. No contractions or leakage of fluid. Signs and symptoms of  labor as well as labor were reviewed. The patients anatomy ultrasound has been completed and reviewed with patient. A 28 week lab panel was ordered. This includes a (CBC, 1 hr GTT). The patient is to complete this in the next two to four weeks. The S/S of Pre-Eclampsia were reviewed with the patient in detail. She is to report any of these if they occur. She currently denies any of these. The patient is RH negative Rhogam Ordered: Not due at this time. Patient Active Problem List    Diagnosis Date Noted    Heartburn during pregnancy in second trimester 2020    23 weeks gestation of pregnancy 2020    21 weeks gestation of pregnancy 2020    20 weeks gestation of pregnancy 2020   Dharmesh Mcgarry of advanced maternal age in second trimester 2020    History of gestational diabetes mellitus (GDM) 2020    Marijuana use 2017     UDS + THC on 17      HRP (high risk pregnancy), second trimester 2017        Diagnosis Orders   1. HRP (high risk pregnancy), second trimester     2.  Multigravida of advanced maternal age in second trimester     3. 24 weeks gestation of pregnancy     Continue prenatal vitamins, increase water intake and frequent rest periods as needed. The patient will return to the office for her next visit in 1 weeks. See antepartum flow sheet.      Electronically signed by: Bro Camejo CNP

## 2020-10-05 NOTE — PATIENT INSTRUCTIONS
Patient Education        Weeks 22 to 26 of Your Pregnancy: Care Instructions  Your Care Instructions     As you enter your 7th month of pregnancy at week 26, your baby's lungs are growing stronger and getting ready to breathe. You may notice that your baby responds to the sound of your or your partner's voice. You may also notice that your baby does less turning and twisting and more squirming or jerking. Jerking often means that your baby has the hiccups. Hiccups are perfectly normal and are only temporary. You may want to think about attending a childbirth preparation class. This is also a good time to start thinking about whether you want to have pain medicine during labor. Most pregnant women are tested for gestational diabetes between weeks 25 and 28. Gestational diabetes occurs when your blood sugar level gets too high when you're pregnant. The test is important, because you can have gestational diabetes and not know it. But the condition can cause problems for your baby. Follow-up care is a key part of your treatment and safety. Be sure to make and go to all appointments, and call your doctor if you are having problems. It's also a good idea to know your test results and keep a list of the medicines you take. How can you care for yourself at home? Ease discomfort from your baby's kicking  · Change your position. Sometimes this will cause your baby to change position too. · Take a deep breath while you raise your arm over your head. Then breathe out while you drop your arm. Do Kegel exercises to prevent urine from leaking  · You can do Kegel exercises while you stand or sit. ? Squeeze the same muscles you would use to stop your urine. Your belly and thighs should not move. ? Hold the squeeze for 3 seconds, and then relax for 3 seconds. ? Start with 3 seconds. Then add 1 second each week until you are able to squeeze for 10 seconds. ? Repeat the exercise 10 to 15 times for each session.  Do three or more sessions each day. Ease or reduce swelling in your feet, ankles, hands, and fingers  · If your fingers are puffy, take off your rings. · Do not eat high-salt foods, such as potato chips. · Prop up your feet on a stool or couch as much as possible. Sleep with pillows under your feet. · Do not stand for long periods of time or wear tight shoes. · Wear support stockings. Where can you learn more? Go to [email or web address]. org and sign in to your [employer] account. Enter G264 in the [employer] box to learn more about \"Weeks 22 to 26 of Your Pregnancy: Care Instructions. \"     If you do not have an account, please click on the \"Sign Up Now\" link. Current as of: February 11, 2020               Content Version: 12.5  © 1639-4299 [employer], [employer]. Care instructions adapted under license by [facility] ([facility]). If you have questions about a medical condition or this instruction, always ask your healthcare professional. [place] [address] any warranty or liability for your use of this information.

## 2020-10-12 ENCOUNTER — ROUTINE PRENATAL (OUTPATIENT)
Dept: OBGYN CLINIC | Age: 40
End: 2020-10-12
Payer: MEDICAID

## 2020-10-12 VITALS
HEART RATE: 79 BPM | SYSTOLIC BLOOD PRESSURE: 117 MMHG | WEIGHT: 156 LBS | DIASTOLIC BLOOD PRESSURE: 72 MMHG | BODY MASS INDEX: 28.53 KG/M2

## 2020-10-12 PROBLEM — Z3A.27 27 WEEKS GESTATION OF PREGNANCY: Status: ACTIVE | Noted: 2020-10-12

## 2020-10-12 PROBLEM — Z3A.25 25 WEEKS GESTATION OF PREGNANCY: Status: ACTIVE | Noted: 2020-10-12

## 2020-10-12 PROCEDURE — G8419 CALC BMI OUT NRM PARAM NOF/U: HCPCS | Performed by: SPECIALIST

## 2020-10-12 PROCEDURE — 99213 OFFICE O/P EST LOW 20 MIN: CPT | Performed by: SPECIALIST

## 2020-10-12 PROCEDURE — G8427 DOCREV CUR MEDS BY ELIG CLIN: HCPCS | Performed by: SPECIALIST

## 2020-10-12 PROCEDURE — 1036F TOBACCO NON-USER: CPT | Performed by: SPECIALIST

## 2020-10-12 PROCEDURE — G8482 FLU IMMUNIZE ORDER/ADMIN: HCPCS | Performed by: SPECIALIST

## 2020-10-12 NOTE — PROGRESS NOTES
Tiffani Webb is a 42 y.o. female 25w2d    [id number]    OB History    Para Term  AB Living   5 3 3   1 3   SAB TAB Ectopic Molar Multiple Live Births   1         3      # Outcome Date GA Lbr Joel/2nd Weight Sex Delivery Anes PTL Lv   5 Current            4 Term 17 39w0d 00:38 / 00:05 6 lb 14.1 oz (3.12 kg) M Vag-Spont EPI N PALLAVI   3 SAB            2 Term 10/12/10 39w0d  7 lb 7 oz (3.374 kg) F Vag-Spont EPI N PALLAVI   1 Term 08 39w0d  7 lb 14 oz (3.572 kg) F Vag-Spont EPI N PALLAVI       Chief Complaint   Patient presents with    Routine Prenatal Visit     Patient is 25w2d pregnant and here for prental visit    High Risk Pregnancy     AMA        Blood pressure 117/72, pulse 79, weight 156 lb (70.8 kg), last menstrual period 2020, not currently breastfeeding. The patient was seen and evaluated. There was positive fetal movements. No contractions or leakage of fluid. Signs and symptoms of  labor as well as labor were reviewed. The patient's anatomy ultrasound has been completed and reviewed with patient. The S/S of Pre-Eclampsia were reviewed with the patient in detail. She is to report any of these if they occur. She currently denies any of these. The patient is RH negative Rhogam Ordered: Not due yet. Patient Active Problem List    Diagnosis Date Noted    27 weeks gestation of pregnancy 10/12/2020    25 weeks gestation of pregnancy 10/12/2020    Heartburn during pregnancy in second trimester 2020    23 weeks gestation of pregnancy 2020    21 weeks gestation of pregnancy 2020    20 weeks gestation of pregnancy 2020   Marene Embs of advanced maternal age in second trimester 2020    History of gestational diabetes mellitus (GDM) 2020    Marijuana use 2017     UDS + THC on 17      HRP (high risk pregnancy), second trimester 2017        Diagnosis Orders   1.  Multigravida of advanced maternal age in second trimester     2. 25 weeks gestation of pregnancy     3. HRP (high risk pregnancy), second trimester       Patient doing well. Fetal heart rate auscultated at 142 bpm and fundal height is 25 cm. today. Patient advised to continue taking prenatal vitamins and to rest as necessary. 28 week labs have been ordered today including 1 hour glucose testing and CBC. Patient was advised that this will be done between 27 and 28 gestational weeks. The patient will return to the office for her next visit in 1 week. See antepartum flow sheet. Catrachito Lieberman am scribing for, and in the presence of Dr. Sheila Qureshi. Electronically signed by: Cortez Castillo 10/12/20 10:08 AM   I agree to the above documentation placed by my scribe Cortez Castillo. I reviewed the scribe's note and agree with the documented findings and plan of care. Any areas of disagreement are noted on the chart. I have personally evaluated this patient. Additional findings are as noted. I agree with the chief complaint, past medical history, past surgical history, allergies, medications, social and family history as documented unless otherwise noted below.      Electronically signed by Sheila Qureshi MD on 10/12/2020 at 4:17 PM

## 2020-10-16 ENCOUNTER — TELEPHONE (OUTPATIENT)
Dept: OBGYN CLINIC | Age: 40
End: 2020-10-16

## 2020-10-19 ENCOUNTER — ROUTINE PRENATAL (OUTPATIENT)
Dept: OBGYN CLINIC | Age: 40
End: 2020-10-19
Payer: MEDICAID

## 2020-10-19 VITALS
WEIGHT: 160 LBS | HEART RATE: 87 BPM | BODY MASS INDEX: 29.26 KG/M2 | SYSTOLIC BLOOD PRESSURE: 117 MMHG | DIASTOLIC BLOOD PRESSURE: 80 MMHG

## 2020-10-19 PROCEDURE — G8427 DOCREV CUR MEDS BY ELIG CLIN: HCPCS | Performed by: CLINICAL NURSE SPECIALIST

## 2020-10-19 PROCEDURE — 1036F TOBACCO NON-USER: CPT | Performed by: CLINICAL NURSE SPECIALIST

## 2020-10-19 PROCEDURE — G8419 CALC BMI OUT NRM PARAM NOF/U: HCPCS | Performed by: CLINICAL NURSE SPECIALIST

## 2020-10-19 PROCEDURE — 99213 OFFICE O/P EST LOW 20 MIN: CPT | Performed by: CLINICAL NURSE SPECIALIST

## 2020-10-19 PROCEDURE — G8482 FLU IMMUNIZE ORDER/ADMIN: HCPCS | Performed by: CLINICAL NURSE SPECIALIST

## 2020-10-19 NOTE — PROGRESS NOTES
Odalys Patrick is a 42 y.o. female 26w2d, patient complains of nose bleed one time this am, didn't last very long, patient states that she does not feel like she is dry in nasal passages. [id number]    OB History    Para Term  AB Living   5 3 3   1 3   SAB TAB Ectopic Molar Multiple Live Births   1         3      # Outcome Date GA Lbr Joel/2nd Weight Sex Delivery Anes PTL Lv   5 Current            4 Term 17 39w0d 00:38 / 00:05 6 lb 14.1 oz (3.12 kg) M Vag-Spont EPI N PALLAVI   3 SAB            2 Term 10/12/10 39w0d  7 lb 7 oz (3.374 kg) F Vag-Spont EPI N PALLAVI   1 Term 08 39w0d  7 lb 14 oz (3.572 kg) F Vag-Spont EPI N PALLAVI         Blood pressure 117/80, pulse 87, weight 160 lb (72.6 kg), last menstrual period 2020, not currently breastfeeding. The patient was seen and evaluated. There was positive fetal movements. No contractions or leakage of fluid. Signs and symptoms of  labor as well as labor were reviewed. The patients anatomy ultrasound has been completed and reviewed with patient. A 28 week lab panel was ordered. This includes a (CBC, 1 hr GTT). The patient is to complete this in the next two to four weeks. The S/S of Pre-Eclampsia were reviewed with the patient in detail. She is to report any of these if they occur. She currently denies any of these. The patient is RH negative Rhogam Ordered: Not due at this time.     Patient Active Problem List    Diagnosis Date Noted    27 weeks gestation of pregnancy 10/12/2020    25 weeks gestation of pregnancy 10/12/2020    Heartburn during pregnancy in second trimester 2020    23 weeks gestation of pregnancy 2020    21 weeks gestation of pregnancy 2020    20 weeks gestation of pregnancy 2020   Mirna Lala of advanced maternal age in second trimester 2020    History of gestational diabetes mellitus (GDM) 2020    Marijuana use 2017     UDS + THC on 17      HRP (high risk pregnancy), second trimester 07/11/2017        Diagnosis Orders   1. 27 weeks gestation of pregnancy  Glucose tolerance, 1 hour    CBC Auto Differential   2. HRP (high risk pregnancy), second trimester     3. Multigravida of advanced maternal age in second trimester  Rhogam Immune Globulin, Antepartum   4. 26 weeks gestation of pregnancy  Rhogam Immune Globulin, Antepartum   Continue prenatal vitamins, increase water intake and frequent rest periods as needed. Patient instructed to call office if she continues to have nose bleed and patient verbalized understanding. The patient will return to the office for her next visit in 1 weeks. See antepartum flow sheet.      Electronically signed by: Isaura Dai CNP

## 2020-10-26 ENCOUNTER — ROUTINE PRENATAL (OUTPATIENT)
Dept: OBGYN CLINIC | Age: 40
End: 2020-10-26
Payer: MEDICAID

## 2020-10-26 VITALS
DIASTOLIC BLOOD PRESSURE: 81 MMHG | WEIGHT: 160 LBS | BODY MASS INDEX: 29.26 KG/M2 | SYSTOLIC BLOOD PRESSURE: 128 MMHG | HEART RATE: 88 BPM

## 2020-10-26 PROBLEM — Z3A.21 21 WEEKS GESTATION OF PREGNANCY: Status: RESOLVED | Noted: 2020-09-20 | Resolved: 2020-10-26

## 2020-10-26 PROBLEM — Z3A.20 20 WEEKS GESTATION OF PREGNANCY: Status: RESOLVED | Noted: 2020-09-08 | Resolved: 2020-10-26

## 2020-10-26 PROBLEM — Z23 NEED FOR TDAP VACCINATION: Status: ACTIVE | Noted: 2020-10-26

## 2020-10-26 PROBLEM — Z3A.25 25 WEEKS GESTATION OF PREGNANCY: Status: RESOLVED | Noted: 2020-10-12 | Resolved: 2020-10-26

## 2020-10-26 PROBLEM — Z3A.23 23 WEEKS GESTATION OF PREGNANCY: Status: RESOLVED | Noted: 2020-09-28 | Resolved: 2020-10-26

## 2020-10-26 PROCEDURE — G8427 DOCREV CUR MEDS BY ELIG CLIN: HCPCS | Performed by: SPECIALIST

## 2020-10-26 PROCEDURE — 1036F TOBACCO NON-USER: CPT | Performed by: SPECIALIST

## 2020-10-26 PROCEDURE — G8482 FLU IMMUNIZE ORDER/ADMIN: HCPCS | Performed by: SPECIALIST

## 2020-10-26 PROCEDURE — 99213 OFFICE O/P EST LOW 20 MIN: CPT | Performed by: SPECIALIST

## 2020-10-26 PROCEDURE — 90471 IMMUNIZATION ADMIN: CPT | Performed by: SPECIALIST

## 2020-10-26 PROCEDURE — G8419 CALC BMI OUT NRM PARAM NOF/U: HCPCS | Performed by: SPECIALIST

## 2020-10-26 PROCEDURE — 90715 TDAP VACCINE 7 YRS/> IM: CPT | Performed by: SPECIALIST

## 2020-10-26 NOTE — PROGRESS NOTES
Jacklyn Palacio is a 42 y.o. female 27w2d    [id number]    OB History    Para Term  AB Living   5 3 3   1 3   SAB TAB Ectopic Molar Multiple Live Births   1         3      # Outcome Date GA Lbr Joel/2nd Weight Sex Delivery Anes PTL Lv   5 Current            4 Term 17 39w0d 00:38 / 00:05 6 lb 14.1 oz (3.12 kg) M Vag-Spont EPI N PALLAVI   3 SAB            2 Term 10/12/10 39w0d  7 lb 7 oz (3.374 kg) F Vag-Spont EPI N PALLAVI   1 Term 08 39w0d  7 lb 14 oz (3.572 kg) F Vag-Spont EPI N PALLAVI       Chief Complaint   Patient presents with    High Risk Pregnancy        Blood pressure 128/81, pulse 88, weight 160 lb (72.6 kg), last menstrual period 2020, not currently breastfeeding. The patient was seen and evaluated. There was positive fetal movements. No contractions or leakage of fluid. Signs and symptoms of  labor as well as labor were reviewed. The S/S of Pre-Eclampsia were reviewed with the patient in detail. She is to report any of these if they occur. She currently denies any of these. The patient had her 28 week labs ordered. The patient was instructed on fetal kick counts and was given a kick sheet to complete every 8 hours. She was instructed that the baby should move at a minimum of ten times within one hour after a meal. The patient was instructed to lay down on her left side twenty minutes after eating and count movements for up to one hour with a target value of ten movements. She was instructed to notify the office if she did not make that target after two attempts or if after any attempt there was less than four movements. The patient reports that the targets have been made Yes.     Patient Active Problem List    Diagnosis Date Noted    Need for Tdap vaccination 10/26/2020    27 weeks gestation of pregnancy 10/12/2020    Heartburn during pregnancy in second trimester 2020    Multigravida of advanced maternal age in second trimester 2020    History of

## 2020-10-28 ENCOUNTER — HOSPITAL ENCOUNTER (OUTPATIENT)
Age: 40
Discharge: HOME | End: 2020-10-28
Payer: MEDICAID

## 2020-10-28 LAB
ABSOLUTE EOS #: 0.1 K/UL (ref 0–0.4)
ABSOLUTE IMMATURE GRANULOCYTE: ABNORMAL K/UL (ref 0–0.3)
ABSOLUTE LYMPH #: 1.6 K/UL (ref 1–4.8)
ABSOLUTE MONO #: 0.5 K/UL (ref 0.1–1.3)
BASOPHILS # BLD: 1 % (ref 0–2)
BASOPHILS ABSOLUTE: 0 K/UL (ref 0–0.2)
DIFFERENTIAL TYPE: ABNORMAL
EOSINOPHILS RELATIVE PERCENT: 1 % (ref 0–4)
GLUCOSE ADMINISTRATION: ABNORMAL
GLUCOSE TOLERANCE SCREEN 50G: 142 MG/DL (ref 70–135)
HCT VFR BLD CALC: 33.4 % (ref 36–46)
HEMOGLOBIN: 11.6 G/DL (ref 12–16)
IMMATURE GRANULOCYTES: ABNORMAL %
LYMPHOCYTES # BLD: 18 % (ref 24–44)
MCH RBC QN AUTO: 30.3 PG (ref 26–34)
MCHC RBC AUTO-ENTMCNC: 34.6 G/DL (ref 31–37)
MCV RBC AUTO: 87.5 FL (ref 80–100)
MONOCYTES # BLD: 6 % (ref 1–7)
NRBC AUTOMATED: ABNORMAL PER 100 WBC
PDW BLD-RTO: 12.6 % (ref 11.5–14.9)
PLATELET # BLD: 306 K/UL (ref 150–450)
PLATELET ESTIMATE: ABNORMAL
PMV BLD AUTO: 8.1 FL (ref 6–12)
RBC # BLD: 3.81 M/UL (ref 4–5.2)
RBC # BLD: ABNORMAL 10*6/UL
SEG NEUTROPHILS: 74 % (ref 36–66)
SEGMENTED NEUTROPHILS ABSOLUTE COUNT: 6.7 K/UL (ref 1.3–9.1)
WBC # BLD: 9 K/UL (ref 3.5–11)
WBC # BLD: ABNORMAL 10*3/UL

## 2020-10-28 PROCEDURE — 82950 GLUCOSE TEST: CPT

## 2020-10-28 PROCEDURE — 85025 COMPLETE CBC W/AUTO DIFF WBC: CPT

## 2020-10-28 PROCEDURE — 86850 RBC ANTIBODY SCREEN: CPT

## 2020-10-28 PROCEDURE — 86901 BLOOD TYPING SEROLOGIC RH(D): CPT

## 2020-10-28 PROCEDURE — 86900 BLOOD TYPING SEROLOGIC ABO: CPT

## 2020-10-28 PROCEDURE — 6360000002 HC RX W HCPCS: Performed by: CLINICAL NURSE SPECIALIST

## 2020-10-28 PROCEDURE — 36415 COLL VENOUS BLD VENIPUNCTURE: CPT

## 2020-10-28 PROCEDURE — 96372 THER/PROPH/DIAG INJ SC/IM: CPT

## 2020-10-28 RX ADMIN — HUMAN RHO(D) IMMUNE GLOBULIN 300 MCG: 1500 SOLUTION INTRAMUSCULAR; INTRAVENOUS at 08:34

## 2020-10-28 NOTE — FLOWSHEET NOTE
Presents to unit for Rhogam injection. Voices no complaints. See MAR. Information sheet: \"Rhophylac delivers: Protection with you in mind' given. Patient verbalizes understanding. Breastfeeding education information given to patient and she verbalizes understanding about the following:  Skin to Skin Contact for You and Your Baby. Benefits of breastfeeding. Risks of formula given and discussed with mother. What do the experts say about the use of pacifiers/supplementation of a  infant? Questions answered. Mother relates she wants to breastfeed.

## 2020-10-30 ENCOUNTER — TELEPHONE (OUTPATIENT)
Dept: OBGYN CLINIC | Age: 40
End: 2020-10-30

## 2020-10-30 NOTE — TELEPHONE ENCOUNTER
----- Message from VIKAS Spence CNP sent at 10/28/2020  1:22 PM EDT -----  Please let the patient know that she failed her 1 hr GTT and a 3 hr order was placed.

## 2020-11-02 ENCOUNTER — ROUTINE PRENATAL (OUTPATIENT)
Dept: OBGYN CLINIC | Age: 40
End: 2020-11-02
Payer: MEDICAID

## 2020-11-02 VITALS
WEIGHT: 161 LBS | SYSTOLIC BLOOD PRESSURE: 122 MMHG | HEART RATE: 84 BPM | DIASTOLIC BLOOD PRESSURE: 78 MMHG | BODY MASS INDEX: 29.45 KG/M2

## 2020-11-02 PROCEDURE — G8482 FLU IMMUNIZE ORDER/ADMIN: HCPCS | Performed by: CLINICAL NURSE SPECIALIST

## 2020-11-02 PROCEDURE — G8427 DOCREV CUR MEDS BY ELIG CLIN: HCPCS | Performed by: CLINICAL NURSE SPECIALIST

## 2020-11-02 PROCEDURE — 99213 OFFICE O/P EST LOW 20 MIN: CPT | Performed by: CLINICAL NURSE SPECIALIST

## 2020-11-02 PROCEDURE — G8419 CALC BMI OUT NRM PARAM NOF/U: HCPCS | Performed by: CLINICAL NURSE SPECIALIST

## 2020-11-02 PROCEDURE — 1036F TOBACCO NON-USER: CPT | Performed by: CLINICAL NURSE SPECIALIST

## 2020-11-02 NOTE — PATIENT INSTRUCTIONS
Patient Education        Weeks 26 to 30 of Your Pregnancy: Care Instructions  Your Care Instructions     You are now in your last trimester of pregnancy. Your baby is growing rapidly. And you'll probably feel your baby moving around more often. Your doctor may ask you to count your baby's kicks. Your back may ache as your body gets used to your baby's size and length. If you haven't already had the Tdap shot during this pregnancy, talk to your doctor about getting it. It will help protect your  against pertussis infection. During this time, it's important to take care of yourself and pay attention to what your body needs. If you feel sexual, explore ways to be close with your partner that match your comfort and desire. Use the tips provided in this care sheet to find ways to be sexual in your own way. Follow-up care is a key part of your treatment and safety. Be sure to make and go to all appointments, and call your doctor if you are having problems. It's also a good idea to know your test results and keep a list of the medicines you take. How can you care for yourself at home? Take it easy at work  · Take frequent breaks. If possible, stop working when you are tired, and rest during your lunch hour. · Take bathroom breaks every 2 hours. · Change positions often. If you sit for long periods, stand up and walk around. · When you stand for a long time, keep one foot on a low stool with your knee bent. After standing a lot, sit with your feet up. · Avoid fumes, chemicals, and tobacco smoke. Be sexual in your own way  · Having sex during pregnancy is okay, unless your doctor tells you not to. · You may be very interested in sex, or you may have no interest at all. · Your growing belly can make it hard to find a good position during intercourse. [employer] and explore. · You may get cramps in your uterus when your partner touches your breasts.   · A back rub may relieve the backache or cramps that sometimes follow orgasm. Learn about  labor  · Watch for signs of  labor. You may be going into labor if:  ? You have menstrual-like cramps, with or without nausea. ? You have about 6 or more contractions in 1 hour, even after you have had a glass of water and are resting. ? You have a low, dull backache that does not go away when you change your position. ? You have pain or pressure in your pelvis that comes and goes in a pattern. ? You have intestinal cramping or flu-like symptoms, with or without diarrhea.  ? You notice an increase or change in your vaginal discharge. Discharge may be heavy, mucus-like, watery, or streaked with blood. ? Your water breaks. · If you think you have  labor:  ? Drink 2 or 3 glasses of water or juice. Not drinking enough fluids can cause contractions. ? Stop what you are doing, and empty your bladder. Then lie down on your left side for at least 1 hour. ? While lying on your side, find your breast bone. Put your fingers in the soft spot just below it. Move your fingers down toward your belly button to find the top of your uterus. Check to see if it is tight. ? Contractions can be weak or strong. Record your contractions for an hour. Time a contraction from the start of one contraction to the start of the next one.  ? Single or several strong contractions without a pattern are called Austin-Mac contractions. They are practice contractions but not the start of labor. They often stop if you change what you are doing. ? Call your doctor if you have regular contractions. Where can you learn more? Go to [email or web address]://[email or web address] org and sign in to your Profig account. Enter L783 in the [facility] box to learn more about \"Weeks 26 to 30 of Your Pregnancy: Care Instructions. \"     If you do not have an account, please click on the \"Sign Up Now\" link.   Current as of: 2020               Content Version: 12.6  © 0907-3858 Watch closely for changes in your health, and be sure to contact your doctor if you have any problems. Where can you learn more? Go to [email or web address]://[email or web address] org and sign in to your [employer] account. Enter S882 in the [employer] box to learn more about \"Counting Your Baby's Kicks: Care Instructions. \"     If you do not have an account, please click on the \"Sign Up Now\" link. Current as of: February 11, 2020               Content Version: 12.6  © 4375-4212 [employer]. Care instructions adapted under license by [facility] ([facility]). If you have questions about a medical condition or this instruction, always ask your healthcare professional. [address] any warranty or liability for your use of this information.

## 2020-11-02 NOTE — PROGRESS NOTES
Elo Edwards is a 42 y.o. female 28w2d, complains of cramping after walking on halloween night, the cramping has subsided. [id number]    OB History    Para Term  AB Living   5 3 3   1 3   SAB TAB Ectopic Molar Multiple Live Births   1         3      # Outcome Date GA Lbr Joel/2nd Weight Sex Delivery Anes PTL Lv   5 Current            4 Term 17 39w0d 00:38 / 00:05 6 lb 14.1 oz (3.12 kg) M Vag-Spont EPI N PALLAVI   3 SAB            2 Term 10/12/10 39w0d  7 lb 7 oz (3.374 kg) F Vag-Spont EPI N PALLAVI   1 Term 08 39w0d  7 lb 14 oz (3.572 kg) F Vag-Spont EPI N PALLAVI       Blood pressure 122/78, pulse 84, weight 161 lb (73 kg), last menstrual period 2020, not currently breastfeeding. The patient was seen and evaluated. There was positive fetal movements. No contractions or leakage of fluid. Signs and symptoms of  labor as well as labor were reviewed. The S/S of Pre-Eclampsia were reviewed with the patient in detail. She is to report any of these if they occur. She currently denies any of these. The patient had her 28 week labs completed. The patient was instructed on fetal kick counts and was given a kick sheet to complete every 8 hours. She was instructed that the baby should move at a minimum of ten times within one hour after a meal. The patient was instructed to lay down on her left side twenty minutes after eating and count movements for up to one hour with a target value of ten movements. She was instructed to notify the office if she did not make that target after two attempts or if after any attempt there was less than four movements. The patient reports that the targets have been made Yes.     Patient Active Problem List    Diagnosis Date Noted    Need for Tdap vaccination 10/26/2020    27 weeks gestation of pregnancy 10/12/2020    Heartburn during pregnancy in second trimester 2020    Multigravida of advanced maternal age in second trimester 2020   Dossie Frances History of gestational diabetes mellitus (GDM) 06/30/2020    Marijuana use 07/26/2017     UDS + THC on 2/22/17      HRP (high risk pregnancy), second trimester 07/11/2017        Diagnosis Orders   1. Encounter for supervision of high risk pregnancy in third trimester, antepartum     2. AMA (advanced maternal age) multigravida 33+, third trimester     3. 28 weeks gestation of pregnancy     Continue prenatal vitamins, increase water intake and frequent rest periods as needed. Fetal kick counts reviewed. Patient given order for 3 hr GTT and patient is aware she needs to schedule that     The patient will return to the office for her next visit in 1 weeks. See antepartum flow sheet.      Electronically signed by: Aly Magaña CNP

## 2020-11-03 LAB
ABO/RH: NORMAL
ANTIBODY SCREEN: NEGATIVE
BLD PROD TYP BPU: NORMAL
BLD PROD TYP BPU: NORMAL
DISPENSE STATUS BLOOD BANK: NORMAL
HISTORY CHECK: NORMAL
Lab: 1
TRANSFUSION STATUS: NORMAL
UNIT DIVISION: 0
UNIT NUMBER: NORMAL

## 2020-11-09 ENCOUNTER — ROUTINE PRENATAL (OUTPATIENT)
Dept: OBGYN CLINIC | Age: 40
End: 2020-11-09
Payer: MEDICAID

## 2020-11-09 VITALS
BODY MASS INDEX: 30 KG/M2 | WEIGHT: 164 LBS | SYSTOLIC BLOOD PRESSURE: 118 MMHG | DIASTOLIC BLOOD PRESSURE: 79 MMHG | HEART RATE: 86 BPM

## 2020-11-09 PROCEDURE — 1036F TOBACCO NON-USER: CPT | Performed by: CLINICAL NURSE SPECIALIST

## 2020-11-09 PROCEDURE — G8419 CALC BMI OUT NRM PARAM NOF/U: HCPCS | Performed by: CLINICAL NURSE SPECIALIST

## 2020-11-09 PROCEDURE — G8482 FLU IMMUNIZE ORDER/ADMIN: HCPCS | Performed by: CLINICAL NURSE SPECIALIST

## 2020-11-09 PROCEDURE — G8427 DOCREV CUR MEDS BY ELIG CLIN: HCPCS | Performed by: CLINICAL NURSE SPECIALIST

## 2020-11-09 PROCEDURE — 99213 OFFICE O/P EST LOW 20 MIN: CPT | Performed by: CLINICAL NURSE SPECIALIST

## 2020-11-09 NOTE — PROGRESS NOTES
Mily Oseguera is a 42 y.o. female 29w2d    [id number]    OB History    Para Term  AB Living   5 3 3   1 3   SAB TAB Ectopic Molar Multiple Live Births   1         3      # Outcome Date GA Lbr Joel/2nd Weight Sex Delivery Anes PTL Lv   5 Current            4 Term 17 39w0d 00:38 / 00:05 6 lb 14.1 oz (3.12 kg) M Vag-Spont EPI N PALLAVI   3 SAB            2 Term 10/12/10 39w0d  7 lb 7 oz (3.374 kg) F Vag-Spont EPI N PALLAVI   1 Term 08 39w0d  7 lb 14 oz (3.572 kg) F Vag-Spont EPI N PALLAVI       Blood pressure 118/79, pulse 86, weight 164 lb (74.4 kg), last menstrual period 2020, not currently breastfeeding. The patient was seen and evaluated. There was positive fetal movements. No contractions or leakage of fluid. Signs and symptoms of  labor as well as labor were reviewed. The S/S of Pre-Eclampsia were reviewed with the patient in detail. She is to report any of these if they occur. She currently denies any of these. The patient had her 28 week labs completed. The patient was instructed on fetal kick counts and was given a kick sheet to complete every 8 hours. She was instructed that the baby should move at a minimum of ten times within one hour after a meal. The patient was instructed to lay down on her left side twenty minutes after eating and count movements for up to one hour with a target value of ten movements. She was instructed to notify the office if she did not make that target after two attempts or if after any attempt there was less than four movements. The patient reports that the targets have been made Yes.     Patient Active Problem List    Diagnosis Date Noted    Need for Tdap vaccination 10/26/2020    27 weeks gestation of pregnancy 10/12/2020    Heartburn during pregnancy in second trimester 2020   Natalia Casas of advanced maternal age in second trimester 2020    History of gestational diabetes mellitus (GDM) 2020    Marijuana use

## 2020-11-09 NOTE — PATIENT INSTRUCTIONS
Patient Education        Counting Your Baby's Kicks: Care Instructions  Your Care Instructions     Counting your baby's kicks is one way your doctor can tell that your baby is healthy. Most women--especially in a first pregnancy--feel their baby move for the first time between 16 and 22 weeks. The movement may feel like flutters rather than kicks. Your baby may move more at certain times of the day. When you are active, you may notice less kicking than when you are resting. At your prenatal visits, your doctor will ask whether the baby is active. In your last trimester, your doctor may ask you to count the number of times you feel your baby move. Follow-up care is a key part of your treatment and safety. Be sure to make and go to all appointments, and call your doctor if you are having problems. It's also a good idea to know your test results and keep a list of the medicines you take. How do you count fetal kicks? · A common method of checking your baby's movement is to count the number of kicks or moves you feel in 1 hour. Ten movements (such as kicks, flutters, or rolls) in 1 hour are normal. Some doctors suggest that you count in the morning until you get to 10 movements. Then you can quit for that day and start again the next day. · Pick your baby's most active time of day to count. This may be any time from morning to evening. · If you do not feel 10 movements in an hour, your baby may be sleeping. Wait for the next hour and count again. When should you call for help? Call your doctor now or seek immediate medical care if:    · You noticed that your baby has stopped moving or is moving much less than normal.   Watch closely for changes in your health, and be sure to contact your doctor if you have any problems. Where can you learn more? Go to https://chgia.[email or web address] org and sign in to your ActionX account.  Enter N428 in the [employer] box to learn more about \"Counting Your Baby's Kicks: Care Instructions. \"     If you do not have an account, please click on the \"Sign Up Now\" link. Current as of: 2020               Content Version: 12.6   [employer] X[employer]. Care instructions adapted under license by [facility] ([facility]). If you have questions about a medical condition or this instruction, always ask your healthcare professional. [place] [address] any warranty or liability for your use of this information. Patient Education        Weeks 26 to 30 of Your Pregnancy: Care Instructions  Your Care Instructions     You are now in your last trimester of pregnancy. Your baby is growing rapidly. And you'll probably feel your baby moving around more often. Your doctor may ask you to count your baby's kicks. Your back may ache as your body gets used to your baby's size and length. If you haven't already had the Tdap shot during this pregnancy, talk to your doctor about getting it. It will help protect your  against pertussis infection. During this time, it's important to take care of yourself and pay attention to what your body needs. If you feel sexual, explore ways to be close with your partner that match your comfort and desire. Use the tips provided in this care sheet to find ways to be sexual in your own way. Follow-up care is a key part of your treatment and safety. Be sure to make and go to all appointments, and call your doctor if you are having problems. It's also a good idea to know your test results and keep a list of the medicines you take. How can you care for yourself at home? Take it easy at work  · Take frequent breaks. If possible, stop working when you are tired, and rest during your lunch hour. · Take bathroom breaks every 2 hours. · Change positions often. If you sit for long periods, stand up and walk around. · When you stand for a long time, keep one foot on a low stool with your knee bent.  After standing often stop if you change what you are doing. ? Call your doctor if you have regular contractions. Where can you learn more? Go to [email or web address] org and sign in to your [employer] account. Enter I217 in the [facility] box to learn more about \"Weeks 26 to 30 of Your Pregnancy: Care Instructions. \"     If you do not have an account, please click on the \"Sign Up Now\" link. Current as of: February 11, 2020               Content Version: 12.6  © 9433-8243 [employer]. Care instructions adapted under license by [facility] ([facility]). If you have questions about a medical condition or this instruction, always ask your healthcare professional. [address] any warranty or liability for your use of this information.

## 2020-11-13 ENCOUNTER — HOSPITAL ENCOUNTER (OUTPATIENT)
Age: 40
Discharge: HOME | End: 2020-11-13
Payer: MEDICAID

## 2020-11-13 LAB
3 HR GLUCOSE: 99 MG/DL (ref 65–139)
AMOUNT GLUCOSE GIVEN: 100 G
GLUCOSE FASTING: 98 MG/DL (ref 65–94)
GLUCOSE TOLERANCE TEST 1 HOUR: 183 MG/DL (ref 65–179)
GLUCOSE TOLERANCE TEST 2 HOUR: 180 MG/DL (ref 65–154)

## 2020-11-13 PROCEDURE — 82952 GTT-ADDED SAMPLES: CPT

## 2020-11-13 PROCEDURE — 36415 COLL VENOUS BLD VENIPUNCTURE: CPT

## 2020-11-13 PROCEDURE — 82951 GLUCOSE TOLERANCE TEST (GTT): CPT

## 2020-11-16 ENCOUNTER — ROUTINE PRENATAL (OUTPATIENT)
Dept: OBGYN CLINIC | Age: 40
End: 2020-11-16
Payer: MEDICAID

## 2020-11-16 ENCOUNTER — TELEPHONE (OUTPATIENT)
Dept: PERINATAL CARE | Age: 40
End: 2020-11-16

## 2020-11-16 VITALS
SYSTOLIC BLOOD PRESSURE: 131 MMHG | BODY MASS INDEX: 30.33 KG/M2 | HEIGHT: 62 IN | HEART RATE: 99 BPM | WEIGHT: 164.8 LBS | DIASTOLIC BLOOD PRESSURE: 91 MMHG | TEMPERATURE: 98.4 F

## 2020-11-16 PROBLEM — Z67.91 RH NEGATIVE STATE IN ANTEPARTUM PERIOD, THIRD TRIMESTER: Status: ACTIVE | Noted: 2020-11-16

## 2020-11-16 PROBLEM — O26.893 RH NEGATIVE STATE IN ANTEPARTUM PERIOD, THIRD TRIMESTER: Status: ACTIVE | Noted: 2020-11-16

## 2020-11-16 PROCEDURE — 1036F TOBACCO NON-USER: CPT | Performed by: CLINICAL NURSE SPECIALIST

## 2020-11-16 PROCEDURE — 99213 OFFICE O/P EST LOW 20 MIN: CPT | Performed by: CLINICAL NURSE SPECIALIST

## 2020-11-16 PROCEDURE — G8482 FLU IMMUNIZE ORDER/ADMIN: HCPCS | Performed by: CLINICAL NURSE SPECIALIST

## 2020-11-16 PROCEDURE — G8427 DOCREV CUR MEDS BY ELIG CLIN: HCPCS | Performed by: CLINICAL NURSE SPECIALIST

## 2020-11-16 PROCEDURE — G8419 CALC BMI OUT NRM PARAM NOF/U: HCPCS | Performed by: CLINICAL NURSE SPECIALIST

## 2020-11-16 RX ORDER — GLUCOSAMINE HCL/CHONDROITIN SU 500-400 MG
CAPSULE ORAL
Qty: 130 STRIP | Refills: 3 | Status: ON HOLD | OUTPATIENT
Start: 2020-11-16 | End: 2021-01-20 | Stop reason: HOSPADM

## 2020-11-16 RX ORDER — LANCETS 30 GAUGE
1 EACH MISCELLANEOUS 2 TIMES DAILY
Qty: 300 EACH | Refills: 1 | Status: ON HOLD | OUTPATIENT
Start: 2020-11-16 | End: 2021-01-20 | Stop reason: HOSPADM

## 2020-11-16 ASSESSMENT — PATIENT HEALTH QUESTIONNAIRE - PHQ9
SUM OF ALL RESPONSES TO PHQ QUESTIONS 1-9: 0
SUM OF ALL RESPONSES TO PHQ QUESTIONS 1-9: 0
SUM OF ALL RESPONSES TO PHQ9 QUESTIONS 1 & 2: 0
2. FEELING DOWN, DEPRESSED OR HOPELESS: 0
1. LITTLE INTEREST OR PLEASURE IN DOING THINGS: 0
SUM OF ALL RESPONSES TO PHQ QUESTIONS 1-9: 0

## 2020-11-16 NOTE — PROGRESS NOTES
09/28/2020    Multigravida of advanced maternal age in second trimester 08/11/2020    History of gestational diabetes mellitus (GDM) 06/30/2020    Marijuana use 07/26/2017     UDS + THC on 2/22/17      Diet controlled gestational diabetes mellitus (GDM) in third trimester 07/11/2017    HRP (high risk pregnancy), second trimester 07/11/2017        Diagnosis Orders   1. HRP (high risk pregnancy), second trimester     2. Multigravida of advanced maternal age in second trimester     3. Rh negative state in antepartum period, third trimester     4. Diet controlled gestational diabetes mellitus (GDM) in third trimester  blood glucose monitor kit and supplies    Lancets MISC    blood glucose monitor strips   5. 30 weeks gestation of pregnancy     Continue prenatal vitamins, increase water intake and frequent rest periods as needed. Fetal kick counts reviewed. Discussed with patient her 3 hr GTT results and she is very tearful. She was informed that MFM referral was placed, testing kit and supplies ordered and patient given blood glucose monitoring sheets given. The patient will return to the office for her next visit in 1 weeks. See antepartum flow sheet.      Electronically signed by: Geraldine Hernández CNP

## 2020-11-16 NOTE — PATIENT INSTRUCTIONS
Patient Education        Gestational Diabetes Diet: Care Instructions  Your Care Instructions     Gestational diabetes is a form of diabetes that can happen during pregnancy. It usually goes away after the baby is born. Diabetes means that your pancreas can't make enough insulin or your body does not use insulin properly. Insulin helps sugar enter your cells, where it is used for energy. You may be able to control your blood sugar while you are pregnant by eating a healthy diet and getting regular exercise. A dietitian or certified diabetes educator (CDE) can help you make a food plan. This plan will help control your blood sugar and provide good nutrition for you and your baby. If diet and exercise don't lower or control your blood sugar, you may need diabetes medicine or insulin. Follow-up care is a key part of your treatment and safety. Be sure to make and go to all appointments, and call your doctor if you are having problems. It's also a good idea to know your test results and keep a list of the medicines you take. How can you care for yourself at home? · Learn which foods have carbohydrate. Eating too much carbohydrate will cause your blood sugar to go too high. Carbohydrate foods include:  ? Breads, cereals, pasta, and rice. ? Dried beans and starchy vegetables, like corn, peas, and potatoes. ? Fruits and fruit juice, milk, and yogurt. ? Candy, table sugar, soda pop, and drinks sweetened with sugar. · Learn how much carbohydrate you need each day. A dietitian or certified diabetes educator (CDE) can teach you how to keep track of how much carbohydrate you eat. · Try to eat the same amount of carbohydrate at each meal. This will help keep your blood sugar steady. Do not save up your daily allowance of carbohydrate to eat at one meal.  · Limit foods that have added sugar. This includes candy, desserts, and soda pop.  These foods need to be counted as part of your total carbohydrate intake for the day.  · Do not drink alcohol. Alcohol is not safe for you or your baby. · Do not skip meals. Your blood sugar may drop too low if you skip meals and use insulin. · Write down what you eat every day. Review your record with your dietitian or CDE to see if you are eating the right amounts of foods. · Check your blood sugar first thing in the morning before you eat. Then check your blood sugar 1 to 2 hours after the first bite of each meal (or as your doctor recommends). This will help you see how the food you eat affects your blood sugar. Keep track of these levels. Share the record with your doctor. When should you call for help? Watch closely for changes in your health, and be sure to contact your doctor if:    · You have questions about your diet.     · You often have problems with high or low blood sugar. Where can you learn more? Go to https://BridgeCopebrandonewbrandy.Nuggeta. org and sign in to your dooyoo account. Enter M291 in the [employer] box to learn more about \"Gestational Diabetes Diet: Care Instructions. \"     If you do not have an account, please click on the \"Sign Up Now\" link. Current as of: December 20, 2019               Content Version: 12.6  © 3704-9977 [employer]. Care instructions adapted under license by [facility]"[employer]," [facility] ([facility]). If you have questions about a medical condition or this instruction, always ask your healthcare professional. [place] [address] any warranty or liability for your use of this information. Patient Education        Gestational Diabetes Diet: Care Instructions  Your Care Instructions     Gestational diabetes is a form of diabetes that can happen during pregnancy. It usually goes away after the baby is born. Diabetes means that your pancreas can't make enough insulin or your body does not use insulin properly. Insulin helps sugar enter your cells, where it is used for energy.   You may be able to control your blood sugar while you are pregnant by eating a healthy diet and getting regular exercise. A dietitian or certified diabetes educator (CDE) can help you make a food plan. This plan will help control your blood sugar and provide good nutrition for you and your baby. If diet and exercise don't lower or control your blood sugar, you may need diabetes medicine or insulin. Follow-up care is a key part of your treatment and safety. Be sure to make and go to all appointments, and call your doctor if you are having problems. It's also a good idea to know your test results and keep a list of the medicines you take. How can you care for yourself at home? · Learn which foods have carbohydrate. Eating too much carbohydrate will cause your blood sugar to go too high. Carbohydrate foods include:  ? Breads, cereals, pasta, and rice. ? Dried beans and starchy vegetables, like corn, peas, and potatoes. ? Fruits and fruit juice, milk, and yogurt. ? Candy, table sugar, soda pop, and drinks sweetened with sugar. · Learn how much carbohydrate you need each day. A dietitian or certified diabetes educator (CDE) can teach you how to keep track of how much carbohydrate you eat. · Try to eat the same amount of carbohydrate at each meal. This will help keep your blood sugar steady. Do not save up your daily allowance of carbohydrate to eat at one meal.  · Limit foods that have added sugar. This includes candy, desserts, and soda pop. These foods need to be counted as part of your total carbohydrate intake for the day. · Do not drink alcohol. Alcohol is not safe for you or your baby. · Do not skip meals. Your blood sugar may drop too low if you skip meals and use insulin. · Write down what you eat every day. Review your record with your dietitian or CDE to see if you are eating the right amounts of foods. · Check your blood sugar first thing in the morning before you eat.  Then check your blood sugar 1 to 2 hours after the first bite of each meal (or as your doctor recommends). This will help you see how the food you eat affects your blood sugar. Keep track of these levels. Share the record with your doctor. When should you call for help? Watch closely for changes in your health, and be sure to contact your doctor if:    · You have questions about your diet.     · You often have problems with high or low blood sugar. Where can you learn more? Go to https://BilimspeAmigos y Amigoseweb.Currently. org and sign in to your [employer] account. Enter M291 in the [employer] box to learn more about \"Gestational Diabetes Diet: Care Instructions. \"     If you do not have an account, please click on the \"Sign Up Now\" link. Current as of: December 20, 2019               Content Version: 12.6  © 1887-1868 [employer]. Care instructions adapted under license by [facility] ([facility]). If you have questions about a medical condition or this instruction, always ask your healthcare professional. [place] [address] any warranty or liability for your use of this information.

## 2020-11-23 ENCOUNTER — ROUTINE PRENATAL (OUTPATIENT)
Dept: OBGYN CLINIC | Age: 40
End: 2020-11-23
Payer: MEDICAID

## 2020-11-23 VITALS
DIASTOLIC BLOOD PRESSURE: 86 MMHG | SYSTOLIC BLOOD PRESSURE: 134 MMHG | WEIGHT: 167 LBS | BODY MASS INDEX: 30.54 KG/M2 | HEART RATE: 88 BPM

## 2020-11-23 PROBLEM — Z3A.32 32 WEEKS GESTATION OF PREGNANCY: Status: ACTIVE | Noted: 2020-11-23

## 2020-11-23 PROBLEM — O09.523 MULTIGRAVIDA OF ADVANCED MATERNAL AGE IN THIRD TRIMESTER: Status: ACTIVE | Noted: 2020-08-11

## 2020-11-23 PROCEDURE — G8419 CALC BMI OUT NRM PARAM NOF/U: HCPCS | Performed by: SPECIALIST

## 2020-11-23 PROCEDURE — G8427 DOCREV CUR MEDS BY ELIG CLIN: HCPCS | Performed by: SPECIALIST

## 2020-11-23 PROCEDURE — 99213 OFFICE O/P EST LOW 20 MIN: CPT | Performed by: SPECIALIST

## 2020-11-23 PROCEDURE — G8482 FLU IMMUNIZE ORDER/ADMIN: HCPCS | Performed by: SPECIALIST

## 2020-11-23 PROCEDURE — 1036F TOBACCO NON-USER: CPT | Performed by: SPECIALIST

## 2020-11-23 NOTE — PROGRESS NOTES
second trimester 2020   Vicki Hay of advanced maternal age in third trimester 2020    History of gestational diabetes mellitus (GDM) 2020    Marijuana use 2017     UDS + THC on 17      Diet controlled gestational diabetes mellitus (GDM) in third trimester 2017    HRP (high risk pregnancy), second trimester 2017        Diagnosis Orders   1. 31 weeks gestation of pregnancy     2. Diet controlled gestational diabetes mellitus (GDM) in third trimester     3. Multigravida of advanced maternal age in third trimester         Patient states that she slipped yesterday and did the splits and she is sore today. She also states that one of her coworkers has been hospitalized with Covid-19. Patient aware that she will be seen twice a week starting next week for  testing. Fetal heart rate ausculted at 154 bpm and fundal height is 32 cm. today. Patient advised to continue taking prenatal vitamins and to rest as necessary. The patient will return to the office for her next visit in 1 week and  testing will be started at that time. See antepartum flow sheet. Catrachito Lieberman am scribing for, and in the presence of Dr. Sheila Qureshi. Electronically signed by: Cortez Castillo 20 9:51 AM   I agree to the above documentation placed by my scribe Cortez Castillo. I reviewed the scribe's note and agree with the documented findings and plan of care. Any areas of disagreement are noted on the chart. I have personally evaluated this patient. Additional findings are as noted. I agree with the chief complaint, past medical history, past surgical history, allergies, medications, social and family history as documented unless otherwise noted below.      Electronically signed by Sheila Qureshi MD on 2020 at 2:35 AM

## 2020-11-24 ENCOUNTER — ROUTINE PRENATAL (OUTPATIENT)
Dept: PERINATAL CARE | Age: 40
End: 2020-11-24
Payer: MEDICAID

## 2020-11-24 ENCOUNTER — TELEPHONE (OUTPATIENT)
Dept: PERINATAL CARE | Age: 40
End: 2020-11-24

## 2020-11-24 ENCOUNTER — HOSPITAL ENCOUNTER (OUTPATIENT)
Age: 40
Setting detail: SPECIMEN
Discharge: HOME | End: 2020-11-24
Payer: MEDICAID

## 2020-11-24 VITALS
SYSTOLIC BLOOD PRESSURE: 121 MMHG | WEIGHT: 167.5 LBS | HEIGHT: 62 IN | HEART RATE: 112 BPM | BODY MASS INDEX: 30.82 KG/M2 | DIASTOLIC BLOOD PRESSURE: 76 MMHG | TEMPERATURE: 97.2 F | RESPIRATION RATE: 16 BRPM

## 2020-11-24 PROBLEM — Z3A.27 27 WEEKS GESTATION OF PREGNANCY: Status: RESOLVED | Noted: 2020-10-12 | Resolved: 2020-11-24

## 2020-11-24 PROBLEM — R03.0 ELEVATED BP WITHOUT DIAGNOSIS OF HYPERTENSION: Status: ACTIVE | Noted: 2020-11-24

## 2020-11-24 LAB
SEND OUT REPORT: NORMAL
TEST NAME: NORMAL
TOXOPLASM IGM: 0.15 INDEX
TOXOPLASMA BLOOD FOR RATIO: <0.5 IU/ML

## 2020-11-24 PROCEDURE — 86747 PARVOVIRUS ANTIBODY: CPT

## 2020-11-24 PROCEDURE — 76819 FETAL BIOPHYS PROFIL W/O NST: CPT | Performed by: OBSTETRICS & GYNECOLOGY

## 2020-11-24 PROCEDURE — 36415 COLL VENOUS BLD VENIPUNCTURE: CPT

## 2020-11-24 PROCEDURE — G8482 FLU IMMUNIZE ORDER/ADMIN: HCPCS | Performed by: OBSTETRICS & GYNECOLOGY

## 2020-11-24 PROCEDURE — 99243 OFF/OP CNSLTJ NEW/EST LOW 30: CPT | Performed by: OBSTETRICS & GYNECOLOGY

## 2020-11-24 PROCEDURE — G8419 CALC BMI OUT NRM PARAM NOF/U: HCPCS | Performed by: OBSTETRICS & GYNECOLOGY

## 2020-11-24 PROCEDURE — 86777 TOXOPLASMA ANTIBODY: CPT

## 2020-11-24 PROCEDURE — 86778 TOXOPLASMA ANTIBODY IGM: CPT

## 2020-11-24 PROCEDURE — 86644 CMV ANTIBODY: CPT

## 2020-11-24 PROCEDURE — 86658 ENTEROVIRUS ANTIBODY: CPT

## 2020-11-24 PROCEDURE — 76811 OB US DETAILED SNGL FETUS: CPT | Performed by: OBSTETRICS & GYNECOLOGY

## 2020-11-24 PROCEDURE — G8427 DOCREV CUR MEDS BY ELIG CLIN: HCPCS | Performed by: OBSTETRICS & GYNECOLOGY

## 2020-11-24 PROCEDURE — 86645 CMV ANTIBODY IGM: CPT

## 2020-11-25 ENCOUNTER — HOSPITAL ENCOUNTER (OUTPATIENT)
Age: 40
Discharge: HOME | End: 2020-11-25
Attending: SPECIALIST | Admitting: SPECIALIST
Payer: MEDICAID

## 2020-11-25 VITALS
SYSTOLIC BLOOD PRESSURE: 123 MMHG | TEMPERATURE: 98.2 F | RESPIRATION RATE: 16 BRPM | DIASTOLIC BLOOD PRESSURE: 84 MMHG | HEART RATE: 111 BPM

## 2020-11-25 PROBLEM — O09.90 HIGH RISK PREGNANCY, ANTEPARTUM: Status: ACTIVE | Noted: 2020-11-25

## 2020-11-25 PROCEDURE — 59025 FETAL NON-STRESS TEST: CPT

## 2020-11-25 NOTE — PROGRESS NOTES
TESTING NOTE    Amanda Sarah is a 42 y.o. female [id number] at 31w4d    The patient was seen and examined. She is here today for  testing for because she is at high risk for the following reasons: GDMA2 and AMA. The patient was seen at [facility] yesterday and was started on NPH 10 U nightly. She reports FBS 80-98 and 2 hour PP sugars in the 110s. The baby is moving well and she denies any complaints. Patient Active Problem List   Diagnosis    GDMA2    HRP (high risk pregnancy), second trimester    Marijuana use    History of gestational diabetes mellitus (GDM)    Multigravida of advanced maternal age in third trimester    Heartburn during pregnancy in second trimester    Need for Tdap vaccination    Rh negative state in antepartum period, third trimester    32 weeks gestation of pregnancy    Elevated BP x1    High risk pregnancy, antepartum       Vitals:  Vitals:    20 1536   BP: 123/84   Pulse: 111   Resp: 16   Temp: 98.2 °F (36.8 °C)   TempSrc: Oral         NST:   Fetal heart rate baseline: 150, moderate variability, 15x15 accelerations present, isolated variable deceleration with spontaneous returnt to baseline with moderate variability and accelerations     The tracing has been reviewed and is considered reactive. Assessment/Plan:  1. Amanda Sarah is a 42 y.o. female [id number] at 31w4d  2. NST reactive    - The patient will continue with her scheduled office appointments. Next appointment is on . Next [facility] appointment is on .    - She will continue with her  testing as scheduled. - Signs and Symptoms of  labor precautions were reviewed. - She was counseled on adequate hydration prior to discharge   - The patient was instructed on fetal kick counts. 3. Discussed results with Dr. Shabana Barnhart who is agreeable to the above plan.      Enrique Carlisle DO  Ob/Gyn Resident   2020, 3:47 PM

## 2020-11-27 LAB
PARVOVIRUS B19 IGG ANTIBODY: 7.07 IV
PARVOVIRUS B19 IGM ANTIBODY: 2.43 IV

## 2020-11-28 LAB — COXSACKIE A9 AB: NORMAL

## 2020-11-30 ENCOUNTER — ROUTINE PRENATAL (OUTPATIENT)
Dept: OBGYN CLINIC | Age: 40
End: 2020-11-30
Payer: MEDICAID

## 2020-11-30 VITALS
DIASTOLIC BLOOD PRESSURE: 85 MMHG | TEMPERATURE: 98.2 F | BODY MASS INDEX: 30.73 KG/M2 | SYSTOLIC BLOOD PRESSURE: 128 MMHG | WEIGHT: 168 LBS | HEART RATE: 103 BPM

## 2020-11-30 LAB
ACCELERATIONS > 10BPM: NORMAL
ACCELERATIONS > 15 BPM: 4
ACOUSTIC STIMULATION: NO
CMV IGM: 0.3
CYTOMEGALOVIRUS IGG ANTIBODY: 6.8
DECELERATIONS: NORMAL
FHR VARIABILITIES: NORMAL
NST ASSESSMENT: REACTIVE
NST BASELINE: 150
NST DURATION: 20 MINUTES
NST INDICATIONS: NORMAL
NST LOCATIONS: NORMAL
NST READ BY: NORMAL
NST RETURN: NORMAL
UTERINE ACTIVITY: NO

## 2020-11-30 PROCEDURE — G8427 DOCREV CUR MEDS BY ELIG CLIN: HCPCS | Performed by: SPECIALIST

## 2020-11-30 PROCEDURE — 1036F TOBACCO NON-USER: CPT | Performed by: SPECIALIST

## 2020-11-30 PROCEDURE — G8482 FLU IMMUNIZE ORDER/ADMIN: HCPCS | Performed by: SPECIALIST

## 2020-11-30 PROCEDURE — G8419 CALC BMI OUT NRM PARAM NOF/U: HCPCS | Performed by: SPECIALIST

## 2020-11-30 PROCEDURE — 99213 OFFICE O/P EST LOW 20 MIN: CPT | Performed by: SPECIALIST

## 2020-11-30 PROCEDURE — 59025 FETAL NON-STRESS TEST: CPT | Performed by: SPECIALIST

## 2020-11-30 RX ORDER — HUMAN INSULIN 100 [IU]/ML
22 INJECTION, SUSPENSION SUBCUTANEOUS NIGHTLY
Status: ON HOLD | COMMUNITY
Start: 2020-11-24 | End: 2021-01-20 | Stop reason: HOSPADM

## 2020-11-30 RX ORDER — PEN NEEDLE, DIABETIC 31 GX5/16"
1 NEEDLE, DISPOSABLE MISCELLANEOUS NIGHTLY
Status: ON HOLD | COMMUNITY
Start: 2020-11-24 | End: 2021-01-20 | Stop reason: HOSPADM

## 2020-11-30 NOTE — PROGRESS NOTES
Elo Edwards is a 42 y.o. female 32w2d    [id number]    OB History    Para Term  AB Living   5 3 3   1 3   SAB TAB Ectopic Molar Multiple Live Births   1         3      # Outcome Date GA Lbr Joel/2nd Weight Sex Delivery Anes PTL Lv   5 Current            4 Term 17 39w0d 00:38 / 00:05 6 lb 14.1 oz (3.12 kg) M Vag-Spont EPI N PALLAVI   3 SAB            2 Term 10/12/10 39w0d  7 lb 7 oz (3.374 kg) F Vag-Spont EPI N PALLAVI   1 Term 08 39w0d  7 lb 14 oz (3.572 kg) F Vag-Spont EPI N PALLAVI       Chief Complaint   Patient presents with    Routine Prenatal Visit     Patient is 32 weeks and 2 days gestation and is here for supervision of high risk pregnancy.  High Risk Pregnancy     abnormal maternal quad screen    Advanced Maternal Age    Gestational Diabetes    Non-stress Test        Blood pressure 128/85, pulse 103, temperature 98.2 °F (36.8 °C), temperature source Temporal, weight 168 lb (76.2 kg), last menstrual period 2020, not currently breastfeeding. The patient was seen and evaluated. There was positive fetal movements. No contractions or leakage of fluid. Signs and symptoms of  labor as well as labor were reviewed. The S/S of Pre-Eclampsia were reviewed with the patient in detail. She is to report any of these if they occur. She currently denies any of these. The patient had her 28 week labs completed. The patient was instructed on fetal kick counts and was given a kick sheet to complete every 8 hours. She was instructed that the baby should move at a minimum of ten times within one hour after a meal. The patient was instructed to lay down on her left side twenty minutes after eating and count movements for up to one hour with a target value of ten movements. She was instructed to notify the office if she did not make that target after two attempts or if after any attempt there was less than four movements.     The patient reports that the targets have been made Yes.    Patient Active Problem List    Diagnosis Date Noted    High risk pregnancy, antepartum 11/25/2020    Elevated BP x1 11/24/2020 11/16/20: 131/91 at 30w2d      32 weeks gestation of pregnancy 11/23/2020    Rh negative state in antepartum period, third trimester 11/16/2020    Need for Tdap vaccination 10/26/2020    Heartburn during pregnancy in second trimester 09/28/2020    Multigravida of advanced maternal age in third trimester 08/11/2020    History of gestational diabetes mellitus (GDM) 06/30/2020    Marijuana use 07/26/2017     UDS + THC on 2/22/17      GDMA2 07/11/2017     NPH 10 U nightly - Started by MFM on 11/24/20      HRP (high risk pregnancy), second trimester 07/11/2017        Diagnosis Orders   1. 32 weeks gestation of pregnancy     2. Multigravida of advanced maternal age in third trimester     3. History of gestational diabetes mellitus (GDM)         Patient states she has been having some contractions, otherwise doing well. Patient was advised to go to the hospital for further evaluation if she has more than 4 or 5 contractions in an hour. NST done today is reactive (see procedures tab for detail result). Fetal heart rate per NST baseline is 150 bpm and fundal height is 32 cm. today. Patient advised to continue taking prenatal vitamins and to rest as necessary. The patient will return to the office for her next visit in 3 days. See antepartum flow sheet. Starla Bean am scribing for, and in the presence of Dr. Anel Carcamo. Electronically signed by: Vinod Mcdonnell 11/30/20 10:18 AM   I agree to the above documentation placed by my scribe Vinod Mcdonnell. I reviewed the scribe's note and agree with the documented findings and plan of care. Any areas of disagreement are noted on the chart. I have personally evaluated this patient. Additional findings are as noted.  I agree with the chief complaint, past medical history, past surgical history, allergies, medications, social and family history as documented unless otherwise noted below.      Electronically signed by Chema Amaral MD on 12/1/2020 at 2:49 AM

## 2020-12-01 PROBLEM — O24.414 INSULIN CONTROLLED GESTATIONAL DIABETES MELLITUS (GDM) IN FIRST TRIMESTER: Status: ACTIVE | Noted: 2017-07-11

## 2020-12-03 ENCOUNTER — TELEPHONE (OUTPATIENT)
Dept: PERINATAL CARE | Age: 40
End: 2020-12-03

## 2020-12-03 ENCOUNTER — HOSPITAL ENCOUNTER (OUTPATIENT)
Age: 40
Setting detail: SPECIMEN
Discharge: HOME | End: 2020-12-03
Payer: MEDICAID

## 2020-12-03 ENCOUNTER — ROUTINE PRENATAL (OUTPATIENT)
Dept: OBGYN CLINIC | Age: 40
End: 2020-12-03
Payer: MEDICAID

## 2020-12-03 VITALS
BODY MASS INDEX: 30.73 KG/M2 | HEART RATE: 107 BPM | DIASTOLIC BLOOD PRESSURE: 85 MMHG | SYSTOLIC BLOOD PRESSURE: 135 MMHG | WEIGHT: 168 LBS

## 2020-12-03 LAB
DIRECT EXAM: NORMAL
Lab: NORMAL
SPECIMEN DESCRIPTION: NORMAL

## 2020-12-03 PROCEDURE — G8482 FLU IMMUNIZE ORDER/ADMIN: HCPCS | Performed by: CLINICAL NURSE SPECIALIST

## 2020-12-03 PROCEDURE — G8427 DOCREV CUR MEDS BY ELIG CLIN: HCPCS | Performed by: CLINICAL NURSE SPECIALIST

## 2020-12-03 PROCEDURE — G8419 CALC BMI OUT NRM PARAM NOF/U: HCPCS | Performed by: CLINICAL NURSE SPECIALIST

## 2020-12-03 PROCEDURE — 1036F TOBACCO NON-USER: CPT | Performed by: CLINICAL NURSE SPECIALIST

## 2020-12-03 PROCEDURE — 99213 OFFICE O/P EST LOW 20 MIN: CPT | Performed by: CLINICAL NURSE SPECIALIST

## 2020-12-03 RX ORDER — FLUCONAZOLE 100 MG/1
100 TABLET ORAL DAILY
Qty: 7 TABLET | Refills: 0 | Status: SHIPPED | OUTPATIENT
Start: 2020-12-03 | End: 2020-12-10

## 2020-12-03 RX ORDER — METRONIDAZOLE 500 MG/1
500 TABLET ORAL 2 TIMES DAILY
Qty: 14 TABLET | Refills: 0 | Status: SHIPPED | OUTPATIENT
Start: 2020-12-03 | End: 2020-12-10

## 2020-12-03 NOTE — PROGRESS NOTES
Elo Edwards is a 42 y.o. female 32w5d, complains of vaginal leakage, denies itching, odor and irritation. [id number]    OB History    Para Term  AB Living   5 3 3   1 3   SAB TAB Ectopic Molar Multiple Live Births   1         3      # Outcome Date GA Lbr Joel/2nd Weight Sex Delivery Anes PTL Lv   5 Current            4 Term 17 39w0d 00:38 / 00:05 6 lb 14.1 oz (3.12 kg) M Vag-Spont EPI N PALLAVI   3 SAB            2 Term 10/12/10 39w0d  7 lb 7 oz (3.374 kg) F Vag-Spont EPI N PALLAVI   1 Term 08 39w0d  7 lb 14 oz (3.572 kg) F Vag-Spont EPI N PALLAVI       Blood pressure 135/85, pulse 107, weight 168 lb (76.2 kg), last menstrual period 2020, not currently breastfeeding. The patient was seen and evaluated. There was positive fetal movements. No contractions or leakage of fluid. Signs and symptoms of  labor as well as labor were reviewed. The S/S of Pre-Eclampsia were reviewed with the patient in detail. She is to report any of these if they occur. She currently denies any of these. The patient had her 28 week labs completed. The patient was instructed on fetal kick counts and was given a kick sheet to complete every 8 hours. She was instructed that the baby should move at a minimum of ten times within one hour after a meal. The patient was instructed to lay down on her left side twenty minutes after eating and count movements for up to one hour with a target value of ten movements. She was instructed to notify the office if she did not make that target after two attempts or if after any attempt there was less than four movements. The patient reports that the targets have been made Yes.     Patient Active Problem List    Diagnosis Date Noted    High risk pregnancy, antepartum 2020    Elevated BP x1 2020: 131/91 at 30w2d      32 weeks gestation of pregnancy 2020    Rh negative state in antepartum period, third trimester 2020    Need for Tdap vaccination 10/26/2020    Heartburn during pregnancy in second trimester 09/28/2020    Multigravida of advanced maternal age in third trimester 08/11/2020    History of gestational diabetes mellitus (GDM) 06/30/2020    Marijuana use 07/26/2017     UDS + THC on 2/22/17      Insulin controlled gestational diabetes mellitus (GDM) in first trimester 07/11/2017     NPH 10 U nightly - Started by MFM on 11/24/20      HRP (high risk pregnancy), second trimester 07/11/2017        Diagnosis Orders   1. Acute vaginitis     2. Multigravida of advanced maternal age in third trimester     3. High risk pregnancy, antepartum     4. 32 weeks gestation of pregnancy     spec exam reveals cervix closed, no pooling noted, but patient has moderate amount of yellow discharge    The patient will return to the office for her next visit in 1 weeks. See antepartum flow sheet.      Electronically signed by: Yonis Vasquez CNP

## 2020-12-03 NOTE — TELEPHONE ENCOUNTER
Blood sugars faxed in and reviewed by Dr. Timothy Prado who ordered pt to increase NPH insulin at hs to 16u and start Novolog insulin 6u before dinner. Pt notified and agreed- verb. back without questions, asked to have called into [address] [place] [address] in [place], [place].  Increase NPH insulin to 16u at hs, 1 mos supply with needles, syringes and alcohol pads, no refills and start Novolog insulin, 6u before dinner, 1mos supply with needles, syringes and alcohol pads, no refills.

## 2020-12-09 ENCOUNTER — ROUTINE PRENATAL (OUTPATIENT)
Dept: PERINATAL CARE | Age: 40
End: 2020-12-09
Payer: MEDICAID

## 2020-12-09 VITALS
HEIGHT: 62 IN | DIASTOLIC BLOOD PRESSURE: 77 MMHG | BODY MASS INDEX: 31.1 KG/M2 | HEART RATE: 116 BPM | WEIGHT: 169 LBS | SYSTOLIC BLOOD PRESSURE: 123 MMHG | RESPIRATION RATE: 16 BRPM | TEMPERATURE: 98.1 F

## 2020-12-09 PROCEDURE — 76820 UMBILICAL ARTERY ECHO: CPT | Performed by: OBSTETRICS & GYNECOLOGY

## 2020-12-09 PROCEDURE — 76827 ECHO EXAM OF FETAL HEART: CPT | Performed by: OBSTETRICS & GYNECOLOGY

## 2020-12-09 PROCEDURE — G8482 FLU IMMUNIZE ORDER/ADMIN: HCPCS | Performed by: OBSTETRICS & GYNECOLOGY

## 2020-12-09 PROCEDURE — 76825 ECHO EXAM OF FETAL HEART: CPT | Performed by: OBSTETRICS & GYNECOLOGY

## 2020-12-09 PROCEDURE — G8427 DOCREV CUR MEDS BY ELIG CLIN: HCPCS | Performed by: OBSTETRICS & GYNECOLOGY

## 2020-12-09 PROCEDURE — 76815 OB US LIMITED FETUS(S): CPT | Performed by: OBSTETRICS & GYNECOLOGY

## 2020-12-09 PROCEDURE — 76819 FETAL BIOPHYS PROFIL W/O NST: CPT | Performed by: OBSTETRICS & GYNECOLOGY

## 2020-12-09 PROCEDURE — 1036F TOBACCO NON-USER: CPT | Performed by: OBSTETRICS & GYNECOLOGY

## 2020-12-09 PROCEDURE — 93325 DOPPLER ECHO COLOR FLOW MAPG: CPT | Performed by: OBSTETRICS & GYNECOLOGY

## 2020-12-09 PROCEDURE — G8419 CALC BMI OUT NRM PARAM NOF/U: HCPCS | Performed by: OBSTETRICS & GYNECOLOGY

## 2020-12-09 PROCEDURE — 99214 OFFICE O/P EST MOD 30 MIN: CPT | Performed by: OBSTETRICS & GYNECOLOGY

## 2020-12-09 PROCEDURE — 76821 MIDDLE CEREBRAL ARTERY ECHO: CPT | Performed by: OBSTETRICS & GYNECOLOGY

## 2020-12-10 ENCOUNTER — ROUTINE PRENATAL (OUTPATIENT)
Dept: OBGYN CLINIC | Age: 40
End: 2020-12-10
Payer: MEDICAID

## 2020-12-10 VITALS
HEART RATE: 84 BPM | SYSTOLIC BLOOD PRESSURE: 110 MMHG | DIASTOLIC BLOOD PRESSURE: 74 MMHG | WEIGHT: 168 LBS | BODY MASS INDEX: 30.73 KG/M2

## 2020-12-10 LAB
ACCELERATIONS > 10BPM: NORMAL
ACCELERATIONS > 15 BPM: 4
ACOUSTIC STIMULATION: NO
DECELERATIONS: NORMAL
FHR VARIABILITIES: NORMAL
NST ASSESSMENT: REACTIVE
NST BASELINE: 140
NST DURATION: 20 MINUTES
NST INDICATIONS: NORMAL
NST LOCATIONS: NORMAL
NST READ BY: NORMAL
NST RETURN: NORMAL
UTERINE ACTIVITY: NO

## 2020-12-10 PROCEDURE — G8482 FLU IMMUNIZE ORDER/ADMIN: HCPCS | Performed by: CLINICAL NURSE SPECIALIST

## 2020-12-10 PROCEDURE — 1036F TOBACCO NON-USER: CPT | Performed by: CLINICAL NURSE SPECIALIST

## 2020-12-10 PROCEDURE — 59025 FETAL NON-STRESS TEST: CPT | Performed by: CLINICAL NURSE SPECIALIST

## 2020-12-10 PROCEDURE — G8427 DOCREV CUR MEDS BY ELIG CLIN: HCPCS | Performed by: CLINICAL NURSE SPECIALIST

## 2020-12-10 PROCEDURE — 99213 OFFICE O/P EST LOW 20 MIN: CPT | Performed by: CLINICAL NURSE SPECIALIST

## 2020-12-10 PROCEDURE — G8419 CALC BMI OUT NRM PARAM NOF/U: HCPCS | Performed by: CLINICAL NURSE SPECIALIST

## 2020-12-10 NOTE — PROGRESS NOTES
Apolonia Keith is a 42 y.o. female 33w5d    [id number]    OB History    Para Term  AB Living   5 3 3   1 3   SAB TAB Ectopic Molar Multiple Live Births   1         3      # Outcome Date GA Lbr Joel/2nd Weight Sex Delivery Anes PTL Lv   5 Current            4 Term 17 39w0d 00:38 / 00:05 6 lb 14.1 oz (3.12 kg) M Vag-Spont EPI N PALLAVI   3 SAB            2 Term 10/12/10 39w0d  7 lb 7 oz (3.374 kg) F Vag-Spont EPI N PALLAVI   1 Term 08 39w0d  7 lb 14 oz (3.572 kg) F Vag-Spont EPI N PALLAVI       Blood pressure 110/74, pulse 84, weight 168 lb (76.2 kg), last menstrual period 2020, not currently breastfeeding. The patient was seen and evaluated. There was positive fetal movements. No contractions or leakage of fluid. Signs and symptoms of  labor as well as labor were reviewed. The S/S of Pre-Eclampsia were reviewed with the patient in detail. She is to report any of these if they occur. She currently denies any of these. The patient had her 28 week labs completed. The patient was instructed on fetal kick counts and was given a kick sheet to complete every 8 hours. She was instructed that the baby should move at a minimum of ten times within one hour after a meal. The patient was instructed to lay down on her left side twenty minutes after eating and count movements for up to one hour with a target value of ten movements. She was instructed to notify the office if she did not make that target after two attempts or if after any attempt there was less than four movements. The patient reports that the targets have been made Yes.     Patient Active Problem List    Diagnosis Date Noted    High risk pregnancy, antepartum 2020    Elevated BP x1 2020: 131/91 at 30w2d      32 weeks gestation of pregnancy 2020    Rh negative state in antepartum period, third trimester 2020    Need for Tdap vaccination 10/26/2020    Heartburn during pregnancy in second trimester 09/28/2020   Adams Bunde of advanced maternal age in third trimester 08/11/2020    History of gestational diabetes mellitus (GDM) 06/30/2020    Marijuana use 07/26/2017     UDS + THC on 2/22/17      Insulin controlled gestational diabetes mellitus (GDM) in first trimester 07/11/2017     NPH 10 U nightly - Started by MFM on 11/24/20      HRP (high risk pregnancy), second trimester 07/11/2017        Diagnosis Orders   1. High-risk pregnancy, third trimester     2. Multigravida of advanced maternal age in third trimester     3. Insulin controlled gestational diabetes mellitus (GDM) in third trimester     4. 33 weeks gestation of pregnancy     5. Rh negative state in antepartum period, third trimester     patient reports blood glucose levels are good and sometimes low, reviewed foods to eat when she is low and patient verbalized understanding. Continue prenatal vitamins, increase water intake and frequent rest periods as needed. Fetal kick counts reviewed. The patient will return to the office for her next visit in 1 weeks. See antepartum flow sheet.      Electronically signed by: Mendoza Tenorio CNP

## 2020-12-14 ENCOUNTER — ROUTINE PRENATAL (OUTPATIENT)
Dept: OBGYN CLINIC | Age: 40
End: 2020-12-14
Payer: MEDICAID

## 2020-12-14 VITALS
TEMPERATURE: 97.4 F | SYSTOLIC BLOOD PRESSURE: 125 MMHG | WEIGHT: 171 LBS | HEART RATE: 119 BPM | BODY MASS INDEX: 31.28 KG/M2 | DIASTOLIC BLOOD PRESSURE: 81 MMHG

## 2020-12-14 LAB
ACCELERATIONS > 10BPM: ABNORMAL
ACCELERATIONS > 15 BPM: 2
ACOUSTIC STIMULATION: NO
DECELERATIONS: ABNORMAL
FHR VARIABILITIES: ABNORMAL
NST ASSESSMENT: REACTIVE
NST BASELINE: 145
NST DURATION: 20 MINUTES
NST INDICATIONS: ABNORMAL
NST LOCATIONS: ABNORMAL
NST READ BY: ABNORMAL
NST RETURN: ABNORMAL
UTERINE ACTIVITY: YES

## 2020-12-14 PROCEDURE — 59025 FETAL NON-STRESS TEST: CPT | Performed by: SPECIALIST

## 2020-12-14 PROCEDURE — 1036F TOBACCO NON-USER: CPT | Performed by: SPECIALIST

## 2020-12-14 PROCEDURE — G8427 DOCREV CUR MEDS BY ELIG CLIN: HCPCS | Performed by: SPECIALIST

## 2020-12-14 PROCEDURE — 99213 OFFICE O/P EST LOW 20 MIN: CPT | Performed by: SPECIALIST

## 2020-12-14 PROCEDURE — G8482 FLU IMMUNIZE ORDER/ADMIN: HCPCS | Performed by: SPECIALIST

## 2020-12-14 PROCEDURE — G8419 CALC BMI OUT NRM PARAM NOF/U: HCPCS | Performed by: SPECIALIST

## 2020-12-14 NOTE — PROGRESS NOTES
Kristi Sigala is a 47 y.o. female 34w2d    [id number]    OB History    Para Term  AB Living   5 3 3   1 3   SAB TAB Ectopic Molar Multiple Live Births   1         3      # Outcome Date GA Lbr Joel/2nd Weight Sex Delivery Anes PTL Lv   5 Current            4 Term 17 39w0d 00:38 / 00:05 6 lb 14.1 oz (3.12 kg) M Vag-Spont EPI N PALLAVI   3 SAB            2 Term 10/12/10 39w0d  7 lb 7 oz (3.374 kg) F Vag-Spont EPI N PALLAVI   1 Term 08 39w0d  7 lb 14 oz (3.572 kg) F Vag-Spont EPI N PALLAVI       Chief Complaint   Patient presents with    Routine Prenatal Visit     Patient is 34 weeks and 2 days gestation and is here for supervision of high risk pregnancy.  High Risk Pregnancy    Advanced Maternal Age    Gestational Diabetes    Non-stress Test        Blood pressure 125/81, pulse 119, temperature 97.4 °F (36.3 °C), temperature source Temporal, weight 171 lb (77.6 kg), last menstrual period 2020, not currently breastfeeding. The patient was seen and evaluated. There was positive fetal movements. No contractions or leakage of fluid. Signs and symptoms of  labor as well as labor were reviewed. The S/S of Pre-Eclampsia were reviewed with the patient in detail. She is to report any of these if they occur. She currently denies any of these. The patient had her 28 week labs completed. The patient was instructed on fetal kick counts and was given a kick sheet to complete every 8 hours. She was instructed that the baby should move at a minimum of ten times within one hour after a meal. The patient was instructed to lay down on her left side twenty minutes after eating and count movements for up to one hour with a target value of ten movements. She was instructed to notify the office if she did not make that target after two attempts or if after any attempt there was less than four movements. The patient reports that the targets have been made Yes.     Patient Active Problem List Diagnosis Date Noted    High risk pregnancy, antepartum 11/25/2020    Elevated BP x1 11/24/2020 11/16/20: 131/91 at 30w2d      32 weeks gestation of pregnancy 11/23/2020    Rh negative state in antepartum period, third trimester 11/16/2020    Need for Tdap vaccination 10/26/2020    Heartburn during pregnancy in second trimester 09/28/2020    Multigravida of advanced maternal age in third trimester 08/11/2020    History of gestational diabetes mellitus (GDM) 06/30/2020    Marijuana use 07/26/2017     UDS + THC on 2/22/17      Insulin controlled gestational diabetes mellitus (GDM) in first trimester 07/11/2017     NPH 10 U nightly - Started by M on 11/24/20      HRP (high risk pregnancy), second trimester 07/11/2017        Diagnosis Orders   1. 34 weeks gestation of pregnancy     2. Multigravida of advanced maternal age in third trimester     3. Insulin controlled gestational diabetes mellitus (GDM) in first trimester         Patient with positive Parvovirus screening. Patient will be seen at [facility] weekly for BPP on Wednesdays. Explained to patient that NST should also be done twice a week for fetal reassurance per recommendations. Patient states that having 3 different appointments were week is very difficult with her work schedule and 3 children at home and only having 1 vehicle. Patient will see if NST can be done on Wednesdays at [facility] following her BPP. If [facility] is unable to accommodate patient, NST may be arranged in this office on Wednesdays. Should patient decide to stop working at this point of her pregnancy, papers will be provided for her to apply for short term disability. NST done today is reactive (see procedures tab for detail result). Fetal heart rate per NST baseline is 145 bpm and fundal height is 35 cm. today. Patient advised to continue taking prenatal vitamins and to rest as necessary. The patient will return to the office for her next visit in 3 days.  See antepartum flow sheet. Amanda Perez am scribing for, and in the presence of Dr. João Parker. Electronically signed by: Magali Griffiths 12/14/20 10:34 AM   I agree to the above documentation placed by my scribe Magali Griffiths. I reviewed the scribe's note and agree with the documented findings and plan of care. Any areas of disagreement are noted on the chart. I have personally evaluated this patient. Additional findings are as noted. I agree with the chief complaint, past medical history, past surgical history, allergies, medications, social and family history as documented unless otherwise noted below.      Electronically signed by João Parker MD on 12/15/2020 at 3:42 AM

## 2020-12-16 ENCOUNTER — ROUTINE PRENATAL (OUTPATIENT)
Dept: PERINATAL CARE | Age: 40
End: 2020-12-16
Payer: MEDICAID

## 2020-12-16 VITALS
TEMPERATURE: 97.2 F | SYSTOLIC BLOOD PRESSURE: 123 MMHG | WEIGHT: 171 LBS | HEIGHT: 62 IN | BODY MASS INDEX: 31.47 KG/M2 | DIASTOLIC BLOOD PRESSURE: 78 MMHG | HEART RATE: 105 BPM | RESPIRATION RATE: 16 BRPM

## 2020-12-16 PROCEDURE — 76819 FETAL BIOPHYS PROFIL W/O NST: CPT | Performed by: OBSTETRICS & GYNECOLOGY

## 2020-12-16 PROCEDURE — 76821 MIDDLE CEREBRAL ARTERY ECHO: CPT | Performed by: OBSTETRICS & GYNECOLOGY

## 2020-12-16 PROCEDURE — 76816 OB US FOLLOW-UP PER FETUS: CPT | Performed by: OBSTETRICS & GYNECOLOGY

## 2020-12-16 PROCEDURE — 99211 OFF/OP EST MAY X REQ PHY/QHP: CPT | Performed by: OBSTETRICS & GYNECOLOGY

## 2020-12-16 PROCEDURE — 76820 UMBILICAL ARTERY ECHO: CPT | Performed by: OBSTETRICS & GYNECOLOGY

## 2020-12-16 PROCEDURE — G8419 CALC BMI OUT NRM PARAM NOF/U: HCPCS | Performed by: OBSTETRICS & GYNECOLOGY

## 2020-12-16 PROCEDURE — G8427 DOCREV CUR MEDS BY ELIG CLIN: HCPCS | Performed by: OBSTETRICS & GYNECOLOGY

## 2020-12-16 NOTE — PROGRESS NOTES
MFM Office Visit:  Blood sugars reviewed (insulin regimen adjusted, as below). Insulin regimen: Continue 6 units subcutaneous (SQ) Novolog before dinner. Insulin regimen increased to: NPH Insulin subcutaneously 22 units before bedtime  (consistently elevated fasting blood sugars above 90's).

## 2020-12-17 ENCOUNTER — NURSE ONLY (OUTPATIENT)
Dept: OBGYN CLINIC | Age: 40
End: 2020-12-17
Payer: MEDICAID

## 2020-12-17 DIAGNOSIS — O09.523 MULTIGRAVIDA OF ADVANCED MATERNAL AGE IN THIRD TRIMESTER: ICD-10-CM

## 2020-12-17 DIAGNOSIS — Z3A.32 32 WEEKS GESTATION OF PREGNANCY: ICD-10-CM

## 2020-12-17 DIAGNOSIS — O24.414 INSULIN CONTROLLED GESTATIONAL DIABETES MELLITUS (GDM) IN THIRD TRIMESTER: ICD-10-CM

## 2020-12-17 LAB
ACCELERATIONS > 10BPM: NORMAL
ACCELERATIONS > 15 BPM: 6
ACOUSTIC STIMULATION: NO
DECELERATIONS: NORMAL
FHR VARIABILITIES: NORMAL
NST ASSESSMENT: REACTIVE
NST BASELINE: 155
NST DURATION: 20 MINUTES
NST INDICATIONS: NORMAL
NST LOCATIONS: NORMAL
NST READ BY: NORMAL
NST RETURN: NORMAL
UTERINE ACTIVITY: NO

## 2020-12-17 PROCEDURE — 59025 FETAL NON-STRESS TEST: CPT | Performed by: SPECIALIST

## 2020-12-21 ENCOUNTER — ROUTINE PRENATAL (OUTPATIENT)
Dept: OBGYN CLINIC | Age: 40
End: 2020-12-21
Payer: MEDICAID

## 2020-12-21 VITALS
SYSTOLIC BLOOD PRESSURE: 126 MMHG | BODY MASS INDEX: 32.01 KG/M2 | DIASTOLIC BLOOD PRESSURE: 78 MMHG | HEART RATE: 88 BPM | WEIGHT: 175 LBS

## 2020-12-21 LAB
ACCELERATIONS > 10BPM: NORMAL
ACCELERATIONS > 15 BPM: 145
ACOUSTIC STIMULATION: NO
DECELERATIONS: NORMAL
FHR VARIABILITIES: NORMAL
NST ASSESSMENT: REACTIVE
NST BASELINE: 145
NST DURATION: 20 MINUTES
NST INDICATIONS: NORMAL
NST LOCATIONS: NORMAL
NST READ BY: NORMAL
NST RETURN: NORMAL
UTERINE ACTIVITY: NO

## 2020-12-21 PROCEDURE — G8427 DOCREV CUR MEDS BY ELIG CLIN: HCPCS | Performed by: SPECIALIST

## 2020-12-21 PROCEDURE — 99213 OFFICE O/P EST LOW 20 MIN: CPT | Performed by: SPECIALIST

## 2020-12-21 PROCEDURE — G8482 FLU IMMUNIZE ORDER/ADMIN: HCPCS | Performed by: SPECIALIST

## 2020-12-21 PROCEDURE — 1036F TOBACCO NON-USER: CPT | Performed by: SPECIALIST

## 2020-12-21 PROCEDURE — G8419 CALC BMI OUT NRM PARAM NOF/U: HCPCS | Performed by: SPECIALIST

## 2020-12-21 PROCEDURE — 59025 FETAL NON-STRESS TEST: CPT | Performed by: SPECIALIST

## 2020-12-21 NOTE — PROGRESS NOTES
Kesha Rangel is a 47 y.o. female 35w2d    [id number]    OB History    Para Term  AB Living   5 3 3   1 3   SAB TAB Ectopic Molar Multiple Live Births   1         3      # Outcome Date GA Lbr Joel/2nd Weight Sex Delivery Anes PTL Lv   5 Current            4 Term 17 39w0d 00:38 / 00:05 6 lb 14.1 oz (3.12 kg) M Vag-Spont EPI N PALLAVI   3 SAB            2 Term 10/12/10 39w0d  7 lb 7 oz (3.374 kg) F Vag-Spont EPI N PALLAVI   1 Term 08 39w0d  7 lb 14 oz (3.572 kg) F Vag-Spont EPI N PALLAVI       Chief Complaint   Patient presents with    High Risk Pregnancy        Blood pressure 126/78, pulse 88, weight 175 lb (79.4 kg), last menstrual period 2020, not currently breastfeeding. The patient was seen and evaluated. There was positive fetal movements. No contractions or leakage of fluid. Signs and symptoms of labor were reviewed. The S/S of Pre-Eclampsia were reviewed with the patient in detail. She is to report any of these if they occur. She currently denies any of these. The patient was instructed on fetal kick counts and was given a kick sheet to complete every 8 hours. She was instructed that the baby should move at a minimum of ten times within one hour after a meal. The patient was instructed to lay down on her left side twenty minutes after eating and count movements for up to one hour with a target value of ten movements. She was instructed to notify the office if she did not make that target after two attempts or if after any attempt there was less than four movements. The patient reports that the targets have been made Yes. Allergies: Allergies as of 2020    (No Known Allergies)       Group Beta Strep collection was completed. No: not due yet    GC and Chlamydia were previously preformed and reviewed. The results are negative.      She has been instructed to call the office at anytime prior to going into the hospital so the on-call physician may direct her to the appropriate facility for care. Exceptions were reviewed including but not limited to: Decreased fetal movement, vaginal Bleeding or hemorrhage, trauma, readily expectant delivery, or any instance where she feels 911 should be utilized. Patient Active Problem List    Diagnosis Date Noted    High risk pregnancy, antepartum 11/25/2020    Elevated BP x1 11/24/2020     Overview Note:     11/16/20: 131/91 at 30w2d      32 weeks gestation of pregnancy 11/23/2020    Rh negative state in antepartum period, third trimester 11/16/2020    Need for Tdap vaccination 10/26/2020    Heartburn during pregnancy in second trimester 09/28/2020    Multigravida of advanced maternal age in third trimester 08/11/2020    History of gestational diabetes mellitus (GDM) 06/30/2020    Marijuana use 07/26/2017     Overview Note:     UDS + THC on 2/22/17      Insulin controlled gestational diabetes mellitus (GDM) in first trimester 07/11/2017     Overview Note:     NPH 10 U nightly - Started by MFM on 11/24/20      HRP (high risk pregnancy), second trimester 07/11/2017        Diagnosis Orders   1. 35 weeks gestation of pregnancy     2. Multigravida of advanced maternal age in third trimester     3. History of gestational diabetes mellitus (GDM)         Patient doing well. She was advised to stop working at this point of her pregnancy due to high risk pregnancy due to [facility] and gestational diabetes. NST done today is reactive (see procedures tab for detail result). Fetal heart rate per NST baseline is 140 bpm and fundal height is 35 cm. today. Patient advised to continue taking prenatal vitamins and to rest as necessary. The patient will return to the office for her next visit in 1 week. Patient will have BPP/NST at [facility] on Wednesday. See antepartum flow sheet. Bryanna Meng am scribing for, and in the presence of Dr. Emerita Aguillon.    Electronically signed by: Mayda Galindo 12/21/20 9:54 AM   I agree to

## 2020-12-23 ENCOUNTER — ROUTINE PRENATAL (OUTPATIENT)
Dept: PERINATAL CARE | Age: 40
End: 2020-12-23
Payer: MEDICAID

## 2020-12-23 VITALS
RESPIRATION RATE: 16 BRPM | TEMPERATURE: 98.2 F | WEIGHT: 173.72 LBS | HEIGHT: 62 IN | HEART RATE: 96 BPM | BODY MASS INDEX: 31.97 KG/M2

## 2020-12-23 PROCEDURE — 99211 OFF/OP EST MAY X REQ PHY/QHP: CPT | Performed by: OBSTETRICS & GYNECOLOGY

## 2020-12-23 PROCEDURE — 76821 MIDDLE CEREBRAL ARTERY ECHO: CPT | Performed by: OBSTETRICS & GYNECOLOGY

## 2020-12-23 PROCEDURE — G8427 DOCREV CUR MEDS BY ELIG CLIN: HCPCS | Performed by: OBSTETRICS & GYNECOLOGY

## 2020-12-23 PROCEDURE — 76819 FETAL BIOPHYS PROFIL W/O NST: CPT | Performed by: OBSTETRICS & GYNECOLOGY

## 2020-12-23 PROCEDURE — 76815 OB US LIMITED FETUS(S): CPT | Performed by: OBSTETRICS & GYNECOLOGY

## 2020-12-23 PROCEDURE — 76820 UMBILICAL ARTERY ECHO: CPT | Performed by: OBSTETRICS & GYNECOLOGY

## 2020-12-23 PROCEDURE — G8419 CALC BMI OUT NRM PARAM NOF/U: HCPCS | Performed by: OBSTETRICS & GYNECOLOGY

## 2020-12-23 NOTE — PROGRESS NOTES
MFM Office Visit:  Blood sugars reviewed (adequate glycemic control). Insulin regimen: Continue 6 units subcutaneous (SQ) Novolog before dinner and NPH Insulin subcutaneously 22 units before bedtime.

## 2020-12-28 ENCOUNTER — ROUTINE PRENATAL (OUTPATIENT)
Dept: OBGYN CLINIC | Age: 40
End: 2020-12-28
Payer: MEDICAID

## 2020-12-28 ENCOUNTER — HOSPITAL ENCOUNTER (OUTPATIENT)
Age: 40
Setting detail: SPECIMEN
Discharge: HOME | End: 2020-12-28
Payer: MEDICAID

## 2020-12-28 VITALS
TEMPERATURE: 97.9 F | BODY MASS INDEX: 32.15 KG/M2 | SYSTOLIC BLOOD PRESSURE: 123 MMHG | WEIGHT: 175.8 LBS | HEART RATE: 107 BPM | DIASTOLIC BLOOD PRESSURE: 84 MMHG

## 2020-12-28 PROBLEM — Z3A.36 36 WEEKS GESTATION OF PREGNANCY: Status: ACTIVE | Noted: 2020-12-28

## 2020-12-28 LAB
ACCELERATIONS > 10BPM: NORMAL
ACCELERATIONS > 15 BPM: 2
ACOUSTIC STIMULATION: NO
DECELERATIONS: NORMAL
FHR VARIABILITIES: NORMAL
NST ASSESSMENT: REACTIVE
NST BASELINE: 145
NST DURATION: 20 MINUTES
NST INDICATIONS: NORMAL
NST LOCATIONS: NORMAL
NST READ BY: NORMAL
NST RETURN: NORMAL
UTERINE ACTIVITY: NO

## 2020-12-28 PROCEDURE — 1036F TOBACCO NON-USER: CPT | Performed by: SPECIALIST

## 2020-12-28 PROCEDURE — 99213 OFFICE O/P EST LOW 20 MIN: CPT | Performed by: SPECIALIST

## 2020-12-28 PROCEDURE — G8482 FLU IMMUNIZE ORDER/ADMIN: HCPCS | Performed by: SPECIALIST

## 2020-12-28 PROCEDURE — G8427 DOCREV CUR MEDS BY ELIG CLIN: HCPCS | Performed by: SPECIALIST

## 2020-12-28 PROCEDURE — G8419 CALC BMI OUT NRM PARAM NOF/U: HCPCS | Performed by: SPECIALIST

## 2020-12-28 PROCEDURE — 59025 FETAL NON-STRESS TEST: CPT | Performed by: SPECIALIST

## 2020-12-28 NOTE — PROGRESS NOTES
Jayshree Haro is a 47 y.o. female 36w2d    [id number]    OB History    Para Term  AB Living   5 3 3   1 3   SAB TAB Ectopic Molar Multiple Live Births   1         3      # Outcome Date GA Lbr Joel/2nd Weight Sex Delivery Anes PTL Lv   5 Current            4 Term 17 39w0d 00:38 / 00:05 6 lb 14.1 oz (3.12 kg) M Vag-Spont EPI N PALLAVI   3 SAB            2 Term 10/12/10 39w0d  7 lb 7 oz (3.374 kg) F Vag-Spont EPI N PALLAVI   1 Term 08 39w0d  7 lb 14 oz (3.572 kg) F Vag-Spont EPI N PALLAVI       Chief Complaint   Patient presents with    Routine Prenatal Visit     Patient is 36 weeks and 2 days gestation and is here for supervision of high risk pregnancy. Patient is getting GBS culture today.  High Risk Pregnancy    Advanced Maternal Age    Gestational Diabetes    Non-stress Test        Blood pressure 123/84, pulse 107, temperature 97.9 °F (36.6 °C), temperature source Temporal, weight 175 lb 12.8 oz (79.7 kg), last menstrual period 2020, not currently breastfeeding. The patient was seen and evaluated. There was positive fetal movements. No contractions or leakage of fluid. Signs and symptoms of labor were reviewed. The S/S of Pre-Eclampsia were reviewed with the patient in detail. She is to report any of these if they occur. She currently denies any of these. The patient was instructed on fetal kick counts and was given a kick sheet to complete every 8 hours. She was instructed that the baby should move at a minimum of ten times within one hour after a meal. The patient was instructed to lay down on her left side twenty minutes after eating and count movements for up to one hour with a target value of ten movements. She was instructed to notify the office if she did not make that target after two attempts or if after any attempt there was less than four movements. The patient reports that the targets have been made Yes. Allergies:   Allergies as of 2020    (No Known Allergies)       Group Beta Strep collection was completed. Yes    GC and Chlamydia were previously preformed and reviewed. The results are negative. She has been instructed to call the office at anytime prior to going into the hospital so the on-call physician may direct her to the appropriate facility for care. Exceptions were reviewed including but not limited to: Decreased fetal movement, vaginal Bleeding or hemorrhage, trauma, readily expectant delivery, or any instance where she feels 911 should be utilized. Patient Active Problem List    Diagnosis Date Noted    36 weeks gestation of pregnancy 12/28/2020    High risk pregnancy, antepartum 11/25/2020    Elevated BP x1 11/24/2020     Overview Note:     11/16/20: 131/91 at 30w2d      32 weeks gestation of pregnancy 11/23/2020    Rh negative state in antepartum period, third trimester 11/16/2020    Need for Tdap vaccination 10/26/2020    Heartburn during pregnancy in second trimester 09/28/2020    Multigravida of advanced maternal age in third trimester 08/11/2020    History of gestational diabetes mellitus (GDM) 06/30/2020    Marijuana use 07/26/2017     Overview Note:     UDS + THC on 2/22/17      Insulin controlled gestational diabetes mellitus (GDM) in first trimester 07/11/2017     Overview Note:     NPH 10 U nightly - Started by MFM on 11/24/20      HRP (high risk pregnancy), second trimester 07/11/2017        Diagnosis Orders   1. 36 weeks gestation of pregnancy  Group B Strep,PCR   2. Multigravida of advanced maternal age in third trimester     3. Insulin controlled gestational diabetes mellitus (GDM) in first trimester         Patient having contractions today. NST done today is reactive (see procedures tab for detail result). Patient advised to go to hospital for evaluation if she has more than 4 to 5 contractions in an hour. Fetal heart rate per NST baseline is 145 bpm and fundal height is 36 cm. today. Group B done today.   Cervix is closed on exam today. Patient advised to stop working until the completion of her postpartum period due to high risk pregnancy and advanced gestational age. Patient advised to continue taking prenatal vitamins and to rest as necessary. The patient will return to the office for her next visit in 3 days. See antepartum flow sheet. Kodi Aguilar am scribing for, and in the presence of Dr. Benja Hameed. Electronically signed by: Nathen Carter 12/28/20 10:08 AM   I agree to the above documentation placed by my scribe Nathen Carter. I reviewed the scribe's note and agree with the documented findings and plan of care. Any areas of disagreement are noted on the chart. I have personally evaluated this patient. Additional findings are as noted. I agree with the chief complaint, past medical history, past surgical history, allergies, medications, social and family history as documented unless otherwise noted below.      Electronically signed by Benja Hameed MD on 12/29/2020 at 3:56 AM

## 2020-12-29 LAB
DIRECT EXAM: NORMAL
Lab: NORMAL
SPECIMEN DESCRIPTION: NORMAL

## 2020-12-30 ENCOUNTER — ROUTINE PRENATAL (OUTPATIENT)
Dept: PERINATAL CARE | Age: 40
End: 2020-12-30
Payer: MEDICAID

## 2020-12-30 VITALS
SYSTOLIC BLOOD PRESSURE: 119 MMHG | DIASTOLIC BLOOD PRESSURE: 73 MMHG | TEMPERATURE: 98.1 F | HEIGHT: 62 IN | HEART RATE: 86 BPM | WEIGHT: 174.5 LBS | BODY MASS INDEX: 32.11 KG/M2 | RESPIRATION RATE: 16 BRPM

## 2020-12-30 PROCEDURE — 76815 OB US LIMITED FETUS(S): CPT | Performed by: OBSTETRICS & GYNECOLOGY

## 2020-12-30 PROCEDURE — 76821 MIDDLE CEREBRAL ARTERY ECHO: CPT | Performed by: OBSTETRICS & GYNECOLOGY

## 2020-12-30 PROCEDURE — 76819 FETAL BIOPHYS PROFIL W/O NST: CPT | Performed by: OBSTETRICS & GYNECOLOGY

## 2020-12-30 PROCEDURE — 99211 OFF/OP EST MAY X REQ PHY/QHP: CPT | Performed by: OBSTETRICS & GYNECOLOGY

## 2020-12-30 PROCEDURE — G8427 DOCREV CUR MEDS BY ELIG CLIN: HCPCS | Performed by: OBSTETRICS & GYNECOLOGY

## 2020-12-30 PROCEDURE — G8419 CALC BMI OUT NRM PARAM NOF/U: HCPCS | Performed by: OBSTETRICS & GYNECOLOGY

## 2020-12-30 PROCEDURE — 76820 UMBILICAL ARTERY ECHO: CPT | Performed by: OBSTETRICS & GYNECOLOGY

## 2021-01-04 ENCOUNTER — TELEPHONE (OUTPATIENT)
Dept: OBGYN CLINIC | Age: 41
End: 2021-01-04

## 2021-01-04 ENCOUNTER — ROUTINE PRENATAL (OUTPATIENT)
Dept: OBGYN CLINIC | Age: 41
End: 2021-01-04
Payer: MEDICAID

## 2021-01-04 VITALS
DIASTOLIC BLOOD PRESSURE: 82 MMHG | SYSTOLIC BLOOD PRESSURE: 120 MMHG | TEMPERATURE: 98.6 F | WEIGHT: 175.2 LBS | BODY MASS INDEX: 32.04 KG/M2 | HEART RATE: 105 BPM

## 2021-01-04 DIAGNOSIS — O09.523 MULTIGRAVIDA OF ADVANCED MATERNAL AGE IN THIRD TRIMESTER: ICD-10-CM

## 2021-01-04 DIAGNOSIS — Z3A.32 32 WEEKS GESTATION OF PREGNANCY: ICD-10-CM

## 2021-01-04 DIAGNOSIS — O24.414 INSULIN CONTROLLED GESTATIONAL DIABETES MELLITUS (GDM) IN THIRD TRIMESTER: ICD-10-CM

## 2021-01-04 DIAGNOSIS — Z86.32 HISTORY OF GESTATIONAL DIABETES MELLITUS (GDM): ICD-10-CM

## 2021-01-04 DIAGNOSIS — Z3A.37 37 WEEKS GESTATION OF PREGNANCY: Primary | ICD-10-CM

## 2021-01-04 DIAGNOSIS — O09.93 HIGH-RISK PREGNANCY IN THIRD TRIMESTER: ICD-10-CM

## 2021-01-04 LAB
ACCELERATIONS > 10BPM: NORMAL
ACCELERATIONS > 15 BPM: 2
ACOUSTIC STIMULATION: NO
DECELERATIONS: NORMAL
FHR VARIABILITIES: NORMAL
NST ASSESSMENT: REACTIVE
NST BASELINE: 140
NST DURATION: 20 MINUTES
NST INDICATIONS: NORMAL
NST LOCATIONS: NORMAL
NST READ BY: NORMAL
NST RETURN: NORMAL
UTERINE ACTIVITY: NO

## 2021-01-04 PROCEDURE — 59025 FETAL NON-STRESS TEST: CPT | Performed by: SPECIALIST

## 2021-01-04 PROCEDURE — 1036F TOBACCO NON-USER: CPT | Performed by: SPECIALIST

## 2021-01-04 PROCEDURE — 99213 OFFICE O/P EST LOW 20 MIN: CPT | Performed by: SPECIALIST

## 2021-01-04 PROCEDURE — G8482 FLU IMMUNIZE ORDER/ADMIN: HCPCS | Performed by: SPECIALIST

## 2021-01-04 PROCEDURE — G8419 CALC BMI OUT NRM PARAM NOF/U: HCPCS | Performed by: SPECIALIST

## 2021-01-04 PROCEDURE — G8427 DOCREV CUR MEDS BY ELIG CLIN: HCPCS | Performed by: SPECIALIST

## 2021-01-04 NOTE — PROGRESS NOTES
Pilo North is a 47 y.o. female 42w2d    [id number]    OB History    Para Term  AB Living   5 3 3   1 3   SAB TAB Ectopic Molar Multiple Live Births   1         3      # Outcome Date GA Lbr Joel/2nd Weight Sex Delivery Anes PTL Lv   5 Current            4 Term 17 39w0d 00:38 / 00:05 6 lb 14.1 oz (3.12 kg) M Vag-Spont EPI N PALLAVI   3 SAB            2 Term 10/12/10 39w0d  7 lb 7 oz (3.374 kg) F Vag-Spont EPI N PALLAVI   1 Term 08 39w0d  7 lb 14 oz (3.572 kg) F Vag-Spont EPI N PALLAVI       Chief Complaint   Patient presents with    Routine Prenatal Visit     Patient is 37 weeks and 2 days gestation and is here for supervision of high risk pregnancy.  High Risk Pregnancy    Advanced Maternal Age    Gestational Diabetes    Non-stress Test        Blood pressure 120/82, pulse 105, temperature 98.6 °F (37 °C), temperature source Temporal, weight 175 lb 3.2 oz (79.5 kg), last menstrual period 2020, not currently breastfeeding. The patient was seen and evaluated. There was positive fetal movements. No contractions or leakage of fluid. Signs and symptoms of labor were reviewed. The S/S of Pre-Eclampsia were reviewed with the patient in detail. She is to report any of these if they occur. She currently denies any of these. The patient was instructed on fetal kick counts and was given a kick sheet to complete every 8 hours. She was instructed that the baby should move at a minimum of ten times within one hour after a meal. The patient was instructed to lay down on her left side twenty minutes after eating and count movements for up to one hour with a target value of ten movements. She was instructed to notify the office if she did not make that target after two attempts or if after any attempt there was less than four movements. The patient reports that the targets have been made Yes. Allergies:   Allergies as of 2021    (No Known Allergies)       Group Beta Strep collection was will return to the office for her next visit in 3 days. See antepartum flow sheet. Emmanuelle Crowe am scribing for, and in the presence of Dr. Maurisio Macedo. Electronically signed by: Rafy Mcclure 1/4/21 10:49 AM   I agree to the above documentation placed by my scribe Rafy Mcclure. I reviewed the scribe's note and agree with the documented findings and plan of care. Any areas of disagreement are noted on the chart. I have personally evaluated this patient. Additional findings are as noted. I agree with the chief complaint, past medical history, past surgical history, allergies, medications, social and family history as documented unless otherwise noted below.      Electronically signed by Maurisio Macedo MD on 1/5/2021 at 3:11 AM

## 2021-01-06 ENCOUNTER — ROUTINE PRENATAL (OUTPATIENT)
Dept: PERINATAL CARE | Age: 41
End: 2021-01-06
Payer: MEDICAID

## 2021-01-06 VITALS
WEIGHT: 172 LBS | DIASTOLIC BLOOD PRESSURE: 72 MMHG | TEMPERATURE: 98.3 F | HEIGHT: 62 IN | RESPIRATION RATE: 16 BRPM | HEART RATE: 93 BPM | SYSTOLIC BLOOD PRESSURE: 126 MMHG | BODY MASS INDEX: 31.65 KG/M2

## 2021-01-06 DIAGNOSIS — O98.513 PARVOVIRUS AFFECTING PREGNANCY IN THIRD TRIMESTER, ANTEPARTUM: Primary | ICD-10-CM

## 2021-01-06 DIAGNOSIS — Z36.4 ANTENATAL SCREENING FOR FETAL GROWTH RETARDATION USING ULTRASONICS: ICD-10-CM

## 2021-01-06 DIAGNOSIS — B34.3 PARVOVIRUS AFFECTING PREGNANCY IN THIRD TRIMESTER, ANTEPARTUM: Primary | ICD-10-CM

## 2021-01-06 DIAGNOSIS — O24.414 INSULIN CONTROLLED GESTATIONAL DIABETES MELLITUS (GDM) DURING PREGNANCY, ANTEPARTUM: ICD-10-CM

## 2021-01-06 DIAGNOSIS — O09.523 MULTIGRAVIDA OF ADVANCED MATERNAL AGE IN THIRD TRIMESTER: ICD-10-CM

## 2021-01-06 DIAGNOSIS — Z3A.37 37 WEEKS GESTATION OF PREGNANCY: ICD-10-CM

## 2021-01-06 DIAGNOSIS — O35.9XX0 FETAL ABNORMALITY AFFECTING MANAGEMENT OF MOTHER, SINGLE OR UNSPECIFIED FETUS: ICD-10-CM

## 2021-01-06 PROCEDURE — G8427 DOCREV CUR MEDS BY ELIG CLIN: HCPCS | Performed by: OBSTETRICS & GYNECOLOGY

## 2021-01-06 PROCEDURE — 99211 OFF/OP EST MAY X REQ PHY/QHP: CPT | Performed by: OBSTETRICS & GYNECOLOGY

## 2021-01-06 PROCEDURE — 76816 OB US FOLLOW-UP PER FETUS: CPT | Performed by: OBSTETRICS & GYNECOLOGY

## 2021-01-06 PROCEDURE — G8419 CALC BMI OUT NRM PARAM NOF/U: HCPCS | Performed by: OBSTETRICS & GYNECOLOGY

## 2021-01-06 PROCEDURE — 76819 FETAL BIOPHYS PROFIL W/O NST: CPT | Performed by: OBSTETRICS & GYNECOLOGY

## 2021-01-06 PROCEDURE — 76820 UMBILICAL ARTERY ECHO: CPT | Performed by: OBSTETRICS & GYNECOLOGY

## 2021-01-06 PROCEDURE — 76821 MIDDLE CEREBRAL ARTERY ECHO: CPT | Performed by: OBSTETRICS & GYNECOLOGY

## 2021-01-07 ENCOUNTER — NURSE ONLY (OUTPATIENT)
Dept: OBGYN CLINIC | Age: 41
End: 2021-01-07
Payer: MEDICAID

## 2021-01-07 DIAGNOSIS — Z3A.32 32 WEEKS GESTATION OF PREGNANCY: ICD-10-CM

## 2021-01-07 DIAGNOSIS — O09.523 MULTIGRAVIDA OF ADVANCED MATERNAL AGE IN THIRD TRIMESTER: ICD-10-CM

## 2021-01-07 DIAGNOSIS — O24.414 INSULIN CONTROLLED GESTATIONAL DIABETES MELLITUS (GDM) IN THIRD TRIMESTER: ICD-10-CM

## 2021-01-07 LAB
ACCELERATIONS > 10BPM: NORMAL
ACCELERATIONS > 15 BPM: 5
ACOUSTIC STIMULATION: NO
DECELERATIONS: NORMAL
FHR VARIABILITIES: NORMAL
NST ASSESSMENT: REACTIVE
NST BASELINE: 135
NST DURATION: 20 MINUTES
NST INDICATIONS: NORMAL
NST LOCATIONS: NORMAL
NST READ BY: NORMAL
NST RETURN: NORMAL
UTERINE ACTIVITY: NO

## 2021-01-07 PROCEDURE — 59025 FETAL NON-STRESS TEST: CPT | Performed by: SPECIALIST

## 2021-01-11 ENCOUNTER — ROUTINE PRENATAL (OUTPATIENT)
Dept: OBGYN CLINIC | Age: 41
End: 2021-01-11
Payer: MEDICAID

## 2021-01-11 VITALS
BODY MASS INDEX: 32.45 KG/M2 | TEMPERATURE: 97.9 F | WEIGHT: 177.4 LBS | DIASTOLIC BLOOD PRESSURE: 79 MMHG | HEART RATE: 91 BPM | SYSTOLIC BLOOD PRESSURE: 120 MMHG

## 2021-01-11 DIAGNOSIS — Z3A.38 38 WEEKS GESTATION OF PREGNANCY: Primary | ICD-10-CM

## 2021-01-11 DIAGNOSIS — O09.523 MULTIGRAVIDA OF ADVANCED MATERNAL AGE IN THIRD TRIMESTER: ICD-10-CM

## 2021-01-11 DIAGNOSIS — O09.93 HIGH-RISK PREGNANCY IN THIRD TRIMESTER: ICD-10-CM

## 2021-01-11 LAB
ACCELERATIONS > 10BPM: NORMAL
ACCELERATIONS > 15 BPM: 3
ACOUSTIC STIMULATION: NO
DECELERATIONS: NORMAL
FHR VARIABILITIES: NORMAL
NST ASSESSMENT: REACTIVE
NST BASELINE: 135
NST DURATION: 20 MINUTES
NST INDICATIONS: NORMAL
NST LOCATIONS: NORMAL
NST READ BY: NORMAL
NST RETURN: NORMAL
UTERINE ACTIVITY: NO

## 2021-01-11 PROCEDURE — G8482 FLU IMMUNIZE ORDER/ADMIN: HCPCS | Performed by: SPECIALIST

## 2021-01-11 PROCEDURE — G8427 DOCREV CUR MEDS BY ELIG CLIN: HCPCS | Performed by: SPECIALIST

## 2021-01-11 PROCEDURE — G8419 CALC BMI OUT NRM PARAM NOF/U: HCPCS | Performed by: SPECIALIST

## 2021-01-11 PROCEDURE — 1036F TOBACCO NON-USER: CPT | Performed by: SPECIALIST

## 2021-01-11 PROCEDURE — 99213 OFFICE O/P EST LOW 20 MIN: CPT | Performed by: SPECIALIST

## 2021-01-11 PROCEDURE — 59025 FETAL NON-STRESS TEST: CPT | Performed by: SPECIALIST

## 2021-01-11 ASSESSMENT — PATIENT HEALTH QUESTIONNAIRE - PHQ9
2. FEELING DOWN, DEPRESSED OR HOPELESS: 1
3. TROUBLE FALLING OR STAYING ASLEEP: 1
4. FEELING TIRED OR HAVING LITTLE ENERGY: 0
SUM OF ALL RESPONSES TO PHQ QUESTIONS 1-9: 5
6. FEELING BAD ABOUT YOURSELF - OR THAT YOU ARE A FAILURE OR HAVE LET YOURSELF OR YOUR FAMILY DOWN: 1
9. THOUGHTS THAT YOU WOULD BE BETTER OFF DEAD, OR OF HURTING YOURSELF: 0
10. IF YOU CHECKED OFF ANY PROBLEMS, HOW DIFFICULT HAVE THESE PROBLEMS MADE IT FOR YOU TO DO YOUR WORK, TAKE CARE OF THINGS AT HOME, OR GET ALONG WITH OTHER PEOPLE: 1
SUM OF ALL RESPONSES TO PHQ9 QUESTIONS 1 & 2: 3

## 2021-01-11 NOTE — PROGRESS NOTES
Bharti Gottlieb is a 47 y.o. female 36w4d    [id number]    OB History    Para Term  AB Living   5 3 3   1 3   SAB TAB Ectopic Molar Multiple Live Births   1         3      # Outcome Date GA Lbr Joel/2nd Weight Sex Delivery Anes PTL Lv   5 Current            4 Term 17 39w0d 00:38 / 00:05 6 lb 14.1 oz (3.12 kg) M Vag-Spont EPI N PALLAVI   3 SAB            2 Term 10/12/10 39w0d  7 lb 7 oz (3.374 kg) F Vag-Spont EPI N PALLAVI   1 Term 08 39w0d  7 lb 14 oz (3.572 kg) F Vag-Spont EPI N PALLAVI       Chief Complaint   Patient presents with    Routine Prenatal Visit     Patient is 38 weeks and 2 days gestation and is here for supervision of high risk pregnancy.  High Risk Pregnancy    Advanced Maternal Age    Gestational Diabetes    Non-stress Test        Blood pressure 120/79, pulse 91, temperature 97.9 °F (36.6 °C), temperature source Temporal, weight 177 lb 6.4 oz (80.5 kg), last menstrual period 2020, not currently breastfeeding. The patient was seen and evaluated. There was positive fetal movements. No contractions or leakage of fluid. Signs and symptoms of labor were reviewed. The S/S of Pre-Eclampsia were reviewed with the patient in detail. She is to report any of these if they occur. She currently denies any of these. The patient was instructed on fetal kick counts and was given a kick sheet to complete every 8 hours. She was instructed that the baby should move at a minimum of ten times within one hour after a meal. The patient was instructed to lay down on her left side twenty minutes after eating and count movements for up to one hour with a target value of ten movements. She was instructed to notify the office if she did not make that target after two attempts or if after any attempt there was less than four movements. The patient reports that the targets have been made Yes. Allergies:   Allergies as of 2021    (No Known Allergies)       Group Beta Strep collection was completed. Yes    The patient was found to be GBS: negative    Cervical Exam was:   1-2 cm dilated, 50%, high -3    She has been instructed to call the office at anytime prior to going into the hospital so the on-call physician may direct her to the appropriate facility for care. Exceptions were reviewed including but not limited to: Decreased fetal movement, vaginal Bleeding or hemorrhage, trauma, readily expectant delivery, or any instance where she feels 911 should be utilized. Patient Active Problem List    Diagnosis Date Noted    37 weeks gestation of pregnancy 01/04/2021    36 weeks gestation of pregnancy 12/28/2020    High-risk pregnancy in third trimester 11/25/2020    Elevated BP x1 11/24/2020     Overview Note:     11/16/20: 131/91 at 30w2d      32 weeks gestation of pregnancy 11/23/2020    Rh negative state in antepartum period, third trimester 11/16/2020    Need for Tdap vaccination 10/26/2020    Heartburn during pregnancy in second trimester 09/28/2020    Multigravida of advanced maternal age in third trimester 08/11/2020    History of gestational diabetes mellitus (GDM) 06/30/2020    Marijuana use 07/26/2017     Overview Note:     UDS + THC on 2/22/17      Insulin controlled gestational diabetes mellitus (GDM) in third trimester 07/11/2017     Overview Note:     NPH 10 U nightly - Started by MFM on 11/24/20      HRP (high risk pregnancy), second trimester 07/11/2017        Diagnosis Orders   1. 38 weeks gestation of pregnancy     2. High-risk pregnancy in third trimester     3. Multigravida of advanced maternal age in third trimester         Patient doing well. NST done today is reactive (see procedures tab for detail result). Fetal heart rate per NST baseline is 135 bpm and fundal height is 36 cm. today. Patient advised to continue taking prenatal vitamins and to rest as necessary. The patient will return to the office for her next visit in 3 days. See antepartum flow sheet. Ginette Sabillon, am scribing for, and in the presence of Dr. Squire Pallas. Electronically signed by: Francisco Quintanilla 1/11/21 10:03 AM     I agree to the above documentation placed by my scribe Francisco Quintanilla. I reviewed the scribe's note and agree with the documented findings and plan of care. Any areas of disagreement are noted on the chart. I have personally evaluated this patient. Additional findings are as noted. I agree with the chief complaint, past medical history, past surgical history, allergies, medications, social and family history as documented unless otherwise noted below.      Electronically signed by Squire Pallas, MD on 1/12/2021 at 1:23 AM

## 2021-01-13 ENCOUNTER — ROUTINE PRENATAL (OUTPATIENT)
Dept: PERINATAL CARE | Age: 41
End: 2021-01-13
Payer: MEDICAID

## 2021-01-13 VITALS
SYSTOLIC BLOOD PRESSURE: 126 MMHG | HEIGHT: 62 IN | TEMPERATURE: 98.2 F | HEART RATE: 96 BPM | DIASTOLIC BLOOD PRESSURE: 76 MMHG | RESPIRATION RATE: 16 BRPM | WEIGHT: 176.5 LBS | BODY MASS INDEX: 32.48 KG/M2

## 2021-01-13 DIAGNOSIS — O24.414 INSULIN CONTROLLED GESTATIONAL DIABETES MELLITUS (GDM) DURING PREGNANCY, ANTEPARTUM: ICD-10-CM

## 2021-01-13 DIAGNOSIS — O98.513 PARVOVIRUS AFFECTING PREGNANCY IN THIRD TRIMESTER, ANTEPARTUM: Primary | ICD-10-CM

## 2021-01-13 DIAGNOSIS — B34.3 PARVOVIRUS AFFECTING PREGNANCY IN THIRD TRIMESTER, ANTEPARTUM: Primary | ICD-10-CM

## 2021-01-13 DIAGNOSIS — Z3A.38 38 WEEKS GESTATION OF PREGNANCY: ICD-10-CM

## 2021-01-13 DIAGNOSIS — O35.9XX0 FETAL ABNORMALITY AFFECTING MANAGEMENT OF MOTHER, SINGLE OR UNSPECIFIED FETUS: ICD-10-CM

## 2021-01-13 DIAGNOSIS — O09.523 MULTIGRAVIDA OF ADVANCED MATERNAL AGE IN THIRD TRIMESTER: ICD-10-CM

## 2021-01-13 PROCEDURE — 76821 MIDDLE CEREBRAL ARTERY ECHO: CPT | Performed by: OBSTETRICS & GYNECOLOGY

## 2021-01-13 PROCEDURE — G8419 CALC BMI OUT NRM PARAM NOF/U: HCPCS | Performed by: OBSTETRICS & GYNECOLOGY

## 2021-01-13 PROCEDURE — 76815 OB US LIMITED FETUS(S): CPT | Performed by: OBSTETRICS & GYNECOLOGY

## 2021-01-13 PROCEDURE — 76820 UMBILICAL ARTERY ECHO: CPT | Performed by: OBSTETRICS & GYNECOLOGY

## 2021-01-13 PROCEDURE — 1036F TOBACCO NON-USER: CPT | Performed by: OBSTETRICS & GYNECOLOGY

## 2021-01-13 PROCEDURE — 99212 OFFICE O/P EST SF 10 MIN: CPT | Performed by: OBSTETRICS & GYNECOLOGY

## 2021-01-13 PROCEDURE — G8482 FLU IMMUNIZE ORDER/ADMIN: HCPCS | Performed by: OBSTETRICS & GYNECOLOGY

## 2021-01-13 PROCEDURE — 76819 FETAL BIOPHYS PROFIL W/O NST: CPT | Performed by: OBSTETRICS & GYNECOLOGY

## 2021-01-13 PROCEDURE — G8427 DOCREV CUR MEDS BY ELIG CLIN: HCPCS | Performed by: OBSTETRICS & GYNECOLOGY

## 2021-01-14 ENCOUNTER — HOSPITAL ENCOUNTER (OUTPATIENT)
Dept: LAB | Age: 41
Setting detail: SPECIMEN
Discharge: HOME | End: 2021-01-14
Payer: MEDICAID

## 2021-01-14 ENCOUNTER — NURSE ONLY (OUTPATIENT)
Dept: OBGYN CLINIC | Age: 41
End: 2021-01-14
Payer: MEDICAID

## 2021-01-14 DIAGNOSIS — Z01.818 PRE-OP TESTING: Primary | ICD-10-CM

## 2021-01-14 DIAGNOSIS — O09.93 HIGH-RISK PREGNANCY IN THIRD TRIMESTER: Primary | ICD-10-CM

## 2021-01-14 LAB
ACCELERATIONS > 10BPM: NORMAL
ACCELERATIONS > 15 BPM: 2
ACOUSTIC STIMULATION: NO
DECELERATIONS: NORMAL
FHR VARIABILITIES: NORMAL
NST ASSESSMENT: REACTIVE
NST BASELINE: 135
NST DURATION: 20 MINUTES
NST INDICATIONS: NORMAL
NST LOCATIONS: NORMAL
NST READ BY: NORMAL
NST RETURN: NORMAL
UTERINE ACTIVITY: NO

## 2021-01-14 PROCEDURE — U0003 INFECTIOUS AGENT DETECTION BY NUCLEIC ACID (DNA OR RNA); SEVERE ACUTE RESPIRATORY SYNDROME CORONAVIRUS 2 (SARS-COV-2) (CORONAVIRUS DISEASE [COVID-19]), AMPLIFIED PROBE TECHNIQUE, MAKING USE OF HIGH THROUGHPUT TECHNOLOGIES AS DESCRIBED BY CMS-2020-01-R: HCPCS

## 2021-01-14 PROCEDURE — 59025 FETAL NON-STRESS TEST: CPT | Performed by: SPECIALIST

## 2021-01-14 PROCEDURE — U0005 INFEC AGEN DETEC AMPLI PROBE: HCPCS

## 2021-01-15 LAB
SARS-COV-2, RAPID: NORMAL
SARS-COV-2: NORMAL
SARS-COV-2: NOT DETECTED
SOURCE: NORMAL

## 2021-01-16 ENCOUNTER — TELEPHONE (OUTPATIENT)
Dept: PRIMARY CARE CLINIC | Age: 41
End: 2021-01-16

## 2021-01-18 ENCOUNTER — HOSPITAL ENCOUNTER (INPATIENT)
Age: 41
LOS: 2 days | Discharge: HOME | DRG: 560 | End: 2021-01-20
Attending: SPECIALIST | Admitting: SPECIALIST
Payer: MEDICAID

## 2021-01-18 ENCOUNTER — ANESTHESIA EVENT (OUTPATIENT)
Dept: LABOR AND DELIVERY | Age: 41
DRG: 560 | End: 2021-01-18
Payer: MEDICAID

## 2021-01-18 ENCOUNTER — ANESTHESIA (OUTPATIENT)
Dept: LABOR AND DELIVERY | Age: 41
DRG: 560 | End: 2021-01-18
Payer: MEDICAID

## 2021-01-18 ENCOUNTER — ROUTINE PRENATAL (OUTPATIENT)
Dept: OBGYN CLINIC | Age: 41
End: 2021-01-18
Payer: MEDICAID

## 2021-01-18 ENCOUNTER — TELEPHONE (OUTPATIENT)
Dept: OBGYN CLINIC | Age: 41
End: 2021-01-18

## 2021-01-18 VITALS
TEMPERATURE: 97.6 F | BODY MASS INDEX: 32.56 KG/M2 | SYSTOLIC BLOOD PRESSURE: 135 MMHG | DIASTOLIC BLOOD PRESSURE: 89 MMHG | HEART RATE: 96 BPM | WEIGHT: 178 LBS

## 2021-01-18 DIAGNOSIS — O09.93 HIGH-RISK PREGNANCY IN THIRD TRIMESTER: ICD-10-CM

## 2021-01-18 DIAGNOSIS — O09.523 MULTIGRAVIDA OF ADVANCED MATERNAL AGE IN THIRD TRIMESTER: ICD-10-CM

## 2021-01-18 DIAGNOSIS — Z3A.39 39 WEEKS GESTATION OF PREGNANCY: Primary | ICD-10-CM

## 2021-01-18 PROBLEM — B34.1 COXSACKIEVIRUSES: Status: ACTIVE | Noted: 2021-01-18

## 2021-01-18 PROBLEM — Z3A.37 37 WEEKS GESTATION OF PREGNANCY: Status: RESOLVED | Noted: 2021-01-04 | Resolved: 2021-01-18

## 2021-01-18 PROBLEM — O09.92 HRP (HIGH RISK PREGNANCY), SECOND TRIMESTER: Status: RESOLVED | Noted: 2017-07-11 | Resolved: 2021-01-18

## 2021-01-18 PROBLEM — Z82.79 FAMILY HISTORY OF CONGENITAL HEART DISEASE: Status: ACTIVE | Noted: 2021-01-18

## 2021-01-18 PROBLEM — Z3A.36 36 WEEKS GESTATION OF PREGNANCY: Status: RESOLVED | Noted: 2020-12-28 | Resolved: 2021-01-18

## 2021-01-18 PROBLEM — Z20.828 PARVOVIRUS EXPOSURE: Status: ACTIVE | Noted: 2021-01-18

## 2021-01-18 PROBLEM — Z3A.32 32 WEEKS GESTATION OF PREGNANCY: Status: RESOLVED | Noted: 2020-11-23 | Resolved: 2021-01-18

## 2021-01-18 PROBLEM — O28.3 ABNORMAL FETAL ULTRASOUND: Status: ACTIVE | Noted: 2021-01-18

## 2021-01-18 LAB
ABO/RH: NORMAL
ABSOLUTE EOS #: 0 K/UL (ref 0–0.4)
ABSOLUTE IMMATURE GRANULOCYTE: ABNORMAL K/UL (ref 0–0.3)
ABSOLUTE LYMPH #: 1.2 K/UL (ref 1–4.8)
ABSOLUTE MONO #: 0.3 K/UL (ref 0.1–1.3)
ACCELERATIONS > 10BPM: ABNORMAL
ACCELERATIONS > 15 BPM: 0
ACOUSTIC STIMULATION: NO
AMPHETAMINE SCREEN URINE: NEGATIVE
ANTIBODY IDENTIFICATION: NORMAL
ANTIBODY SCREEN: POSITIVE
ARM BAND NUMBER: NORMAL
BARBITURATE SCREEN URINE: NEGATIVE
BASOPHILS # BLD: 0 % (ref 0–2)
BASOPHILS ABSOLUTE: 0 K/UL (ref 0–0.2)
BENZODIAZEPINE SCREEN, URINE: NEGATIVE
BUPRENORPHINE URINE: ABNORMAL
CANNABINOID SCREEN URINE: POSITIVE
COCAINE METABOLITE, URINE: NEGATIVE
DECELERATIONS: ABNORMAL
DIFFERENTIAL TYPE: ABNORMAL
EOSINOPHILS RELATIVE PERCENT: 0 % (ref 0–4)
EXPIRATION DATE: NORMAL
FHR VARIABILITIES: ABNORMAL
GLUCOSE BLD-MCNC: 87 MG/DL (ref 65–105)
GLUCOSE BLD-MCNC: 92 MG/DL (ref 65–105)
GLUCOSE BLD-MCNC: 96 MG/DL (ref 65–105)
HCT VFR BLD CALC: 31 % (ref 36–46)
HEMOGLOBIN: 10.4 G/DL (ref 12–16)
IMMATURE GRANULOCYTES: ABNORMAL %
LYMPHOCYTES # BLD: 17 % (ref 24–44)
MCH RBC QN AUTO: 28.1 PG (ref 26–34)
MCHC RBC AUTO-ENTMCNC: 33.7 G/DL (ref 31–37)
MCV RBC AUTO: 83.4 FL (ref 80–100)
MDMA URINE: ABNORMAL
METHADONE SCREEN, URINE: NEGATIVE
METHAMPHETAMINE, URINE: ABNORMAL
MONOCYTES # BLD: 5 % (ref 1–7)
NRBC AUTOMATED: ABNORMAL PER 100 WBC
NST ASSESSMENT: NONREACTIVE
NST BASELINE: 145
NST DURATION: 20 MINUTES
NST INDICATIONS: ABNORMAL
NST LOCATIONS: ABNORMAL
NST READ BY: ABNORMAL
NST RETURN: ABNORMAL
OPIATES, URINE: NEGATIVE
OXYCODONE SCREEN URINE: NEGATIVE
PDW BLD-RTO: 14 % (ref 11.5–14.9)
PHENCYCLIDINE, URINE: NEGATIVE
PLATELET # BLD: 270 K/UL (ref 150–450)
PLATELET ESTIMATE: ABNORMAL
PMV BLD AUTO: 8.6 FL (ref 6–12)
PROPOXYPHENE, URINE: ABNORMAL
RBC # BLD: 3.71 M/UL (ref 4–5.2)
RBC # BLD: ABNORMAL 10*6/UL
SEG NEUTROPHILS: 78 % (ref 36–66)
SEGMENTED NEUTROPHILS ABSOLUTE COUNT: 5.6 K/UL (ref 1.3–9.1)
T. PALLIDUM, IGG: NONREACTIVE
TEST INFORMATION: ABNORMAL
TRICYCLIC ANTIDEPRESSANTS, UR: ABNORMAL
UTERINE ACTIVITY: YES
WBC # BLD: 7.2 K/UL (ref 3.5–11)
WBC # BLD: ABNORMAL 10*3/UL

## 2021-01-18 PROCEDURE — 2580000003 HC RX 258: Performed by: STUDENT IN AN ORGANIZED HEALTH CARE EDUCATION/TRAINING PROGRAM

## 2021-01-18 PROCEDURE — 96374 THER/PROPH/DIAG INJ IV PUSH: CPT

## 2021-01-18 PROCEDURE — 36415 COLL VENOUS BLD VENIPUNCTURE: CPT

## 2021-01-18 PROCEDURE — 3E033VJ INTRODUCTION OF OTHER HORMONE INTO PERIPHERAL VEIN, PERCUTANEOUS APPROACH: ICD-10-PCS | Performed by: SPECIALIST

## 2021-01-18 PROCEDURE — 90707 MMR VACCINE SC: CPT | Performed by: STUDENT IN AN ORGANIZED HEALTH CARE EDUCATION/TRAINING PROGRAM

## 2021-01-18 PROCEDURE — 6360000002 HC RX W HCPCS: Performed by: ANESTHESIOLOGY

## 2021-01-18 PROCEDURE — 10907ZC DRAINAGE OF AMNIOTIC FLUID, THERAPEUTIC FROM PRODUCTS OF CONCEPTION, VIA NATURAL OR ARTIFICIAL OPENING: ICD-10-PCS | Performed by: SPECIALIST

## 2021-01-18 PROCEDURE — 59025 FETAL NON-STRESS TEST: CPT | Performed by: SPECIALIST

## 2021-01-18 PROCEDURE — 6360000002 HC RX W HCPCS: Performed by: STUDENT IN AN ORGANIZED HEALTH CARE EDUCATION/TRAINING PROGRAM

## 2021-01-18 PROCEDURE — 86900 BLOOD TYPING SEROLOGIC ABO: CPT

## 2021-01-18 PROCEDURE — G8427 DOCREV CUR MEDS BY ELIG CLIN: HCPCS | Performed by: SPECIALIST

## 2021-01-18 PROCEDURE — 82947 ASSAY GLUCOSE BLOOD QUANT: CPT

## 2021-01-18 PROCEDURE — 86780 TREPONEMA PALLIDUM: CPT

## 2021-01-18 PROCEDURE — 76815 OB US LIMITED FETUS(S): CPT

## 2021-01-18 PROCEDURE — 82800 BLOOD PH: CPT

## 2021-01-18 PROCEDURE — 7200000001 HC VAGINAL DELIVERY

## 2021-01-18 PROCEDURE — G8419 CALC BMI OUT NRM PARAM NOF/U: HCPCS | Performed by: SPECIALIST

## 2021-01-18 PROCEDURE — 1036F TOBACCO NON-USER: CPT | Performed by: SPECIALIST

## 2021-01-18 PROCEDURE — G8482 FLU IMMUNIZE ORDER/ADMIN: HCPCS | Performed by: SPECIALIST

## 2021-01-18 PROCEDURE — 80307 DRUG TEST PRSMV CHEM ANLYZR: CPT

## 2021-01-18 PROCEDURE — 88307 TISSUE EXAM BY PATHOLOGIST: CPT

## 2021-01-18 PROCEDURE — 6370000000 HC RX 637 (ALT 250 FOR IP): Performed by: STUDENT IN AN ORGANIZED HEALTH CARE EDUCATION/TRAINING PROGRAM

## 2021-01-18 PROCEDURE — 85025 COMPLETE CBC W/AUTO DIFF WBC: CPT

## 2021-01-18 PROCEDURE — 99213 OFFICE O/P EST LOW 20 MIN: CPT | Performed by: SPECIALIST

## 2021-01-18 PROCEDURE — 3700000025 EPIDURAL BLOCK: Performed by: ANESTHESIOLOGY

## 2021-01-18 PROCEDURE — 90471 IMMUNIZATION ADMIN: CPT | Performed by: STUDENT IN AN ORGANIZED HEALTH CARE EDUCATION/TRAINING PROGRAM

## 2021-01-18 PROCEDURE — 96372 THER/PROPH/DIAG INJ SC/IM: CPT

## 2021-01-18 PROCEDURE — 59409 OBSTETRICAL CARE: CPT | Performed by: SPECIALIST

## 2021-01-18 PROCEDURE — 2500000003 HC RX 250 WO HCPCS: Performed by: ANESTHESIOLOGY

## 2021-01-18 PROCEDURE — 86850 RBC ANTIBODY SCREEN: CPT

## 2021-01-18 PROCEDURE — 1220000000 HC SEMI PRIVATE OB R&B

## 2021-01-18 PROCEDURE — 2580000003 HC RX 258: Performed by: ANESTHESIOLOGY

## 2021-01-18 PROCEDURE — 86901 BLOOD TYPING SEROLOGIC RH(D): CPT

## 2021-01-18 PROCEDURE — 86870 RBC ANTIBODY IDENTIFICATION: CPT

## 2021-01-18 RX ORDER — SODIUM CHLORIDE 9 MG/ML
INJECTION, SOLUTION INTRAVENOUS CONTINUOUS
Status: DISCONTINUED | OUTPATIENT
Start: 2021-01-18 | End: 2021-01-19

## 2021-01-18 RX ORDER — SODIUM CHLORIDE, SODIUM LACTATE, POTASSIUM CHLORIDE, CALCIUM CHLORIDE 600; 310; 30; 20 MG/100ML; MG/100ML; MG/100ML; MG/100ML
INJECTION, SOLUTION INTRAVENOUS CONTINUOUS PRN
Status: DISCONTINUED | OUTPATIENT
Start: 2021-01-18 | End: 2021-01-18 | Stop reason: SDUPTHER

## 2021-01-18 RX ORDER — SODIUM CHLORIDE 0.9 % (FLUSH) 0.9 %
10 SYRINGE (ML) INJECTION PRN
Status: DISCONTINUED | OUTPATIENT
Start: 2021-01-18 | End: 2021-01-20 | Stop reason: HOSPADM

## 2021-01-18 RX ORDER — IBUPROFEN 800 MG/1
800 TABLET ORAL EVERY 8 HOURS PRN
Qty: 30 TABLET | Refills: 0 | Status: SHIPPED | OUTPATIENT
Start: 2021-01-18

## 2021-01-18 RX ORDER — DEXTROSE MONOHYDRATE 50 MG/ML
100 INJECTION, SOLUTION INTRAVENOUS PRN
Status: DISCONTINUED | OUTPATIENT
Start: 2021-01-18 | End: 2021-01-20 | Stop reason: HOSPADM

## 2021-01-18 RX ORDER — EPHEDRINE SULFATE 50 MG/ML
10 INJECTION INTRAVENOUS ONCE
Status: DISCONTINUED | OUTPATIENT
Start: 2021-01-18 | End: 2021-01-20 | Stop reason: HOSPADM

## 2021-01-18 RX ORDER — DIPHENHYDRAMINE HCL 25 MG
25 TABLET ORAL EVERY 4 HOURS PRN
Status: DISCONTINUED | OUTPATIENT
Start: 2021-01-18 | End: 2021-01-20 | Stop reason: HOSPADM

## 2021-01-18 RX ORDER — IBUPROFEN 800 MG/1
800 TABLET ORAL EVERY 8 HOURS PRN
Status: DISCONTINUED | OUTPATIENT
Start: 2021-01-18 | End: 2021-01-20 | Stop reason: HOSPADM

## 2021-01-18 RX ORDER — LIDOCAINE HYDROCHLORIDE 10 MG/ML
30 INJECTION, SOLUTION EPIDURAL; INFILTRATION; INTRACAUDAL; PERINEURAL PRN
Status: DISCONTINUED | OUTPATIENT
Start: 2021-01-18 | End: 2021-01-20 | Stop reason: HOSPADM

## 2021-01-18 RX ORDER — ONDANSETRON 2 MG/ML
4 INJECTION INTRAMUSCULAR; INTRAVENOUS EVERY 6 HOURS PRN
Status: DISCONTINUED | OUTPATIENT
Start: 2021-01-18 | End: 2021-01-19

## 2021-01-18 RX ORDER — NICOTINE POLACRILEX 4 MG
15 LOZENGE BUCCAL PRN
Status: DISCONTINUED | OUTPATIENT
Start: 2021-01-18 | End: 2021-01-20 | Stop reason: HOSPADM

## 2021-01-18 RX ORDER — FENTANYL CITRATE 50 UG/ML
25 INJECTION, SOLUTION INTRAMUSCULAR; INTRAVENOUS
Status: DISCONTINUED | OUTPATIENT
Start: 2021-01-18 | End: 2021-01-20 | Stop reason: HOSPADM

## 2021-01-18 RX ORDER — NALOXONE HYDROCHLORIDE 0.4 MG/ML
0.4 INJECTION, SOLUTION INTRAMUSCULAR; INTRAVENOUS; SUBCUTANEOUS PRN
Status: DISCONTINUED | OUTPATIENT
Start: 2021-01-18 | End: 2021-01-19

## 2021-01-18 RX ORDER — ROPIVACAINE HYDROCHLORIDE 2 MG/ML
INJECTION, SOLUTION EPIDURAL; INFILTRATION; PERINEURAL CONTINUOUS PRN
Status: DISCONTINUED | OUTPATIENT
Start: 2021-01-18 | End: 2021-01-18 | Stop reason: SDUPTHER

## 2021-01-18 RX ORDER — DOCUSATE SODIUM 100 MG/1
100 CAPSULE, LIQUID FILLED ORAL 2 TIMES DAILY PRN
Qty: 60 CAPSULE | Refills: 1 | Status: SHIPPED | OUTPATIENT
Start: 2021-01-18 | End: 2021-02-17

## 2021-01-18 RX ORDER — FAMOTIDINE 20 MG/1
20 TABLET, FILM COATED ORAL 2 TIMES DAILY
Status: DISCONTINUED | OUTPATIENT
Start: 2021-01-18 | End: 2021-01-20 | Stop reason: HOSPADM

## 2021-01-18 RX ORDER — NALBUPHINE HCL 10 MG/ML
10 AMPUL (ML) INJECTION ONCE
Status: COMPLETED | OUTPATIENT
Start: 2021-01-18 | End: 2021-01-18

## 2021-01-18 RX ORDER — SODIUM CHLORIDE 0.9 % (FLUSH) 0.9 %
10 SYRINGE (ML) INJECTION EVERY 12 HOURS SCHEDULED
Status: DISCONTINUED | OUTPATIENT
Start: 2021-01-18 | End: 2021-01-20 | Stop reason: HOSPADM

## 2021-01-18 RX ORDER — ACETAMINOPHEN 500 MG
1000 TABLET ORAL EVERY 6 HOURS PRN
Status: DISCONTINUED | OUTPATIENT
Start: 2021-01-18 | End: 2021-01-20

## 2021-01-18 RX ORDER — DOCUSATE SODIUM 100 MG/1
100 CAPSULE, LIQUID FILLED ORAL 2 TIMES DAILY
Status: DISCONTINUED | OUTPATIENT
Start: 2021-01-18 | End: 2021-01-20 | Stop reason: HOSPADM

## 2021-01-18 RX ORDER — SIMETHICONE 80 MG
80 TABLET,CHEWABLE ORAL EVERY 6 HOURS PRN
Status: DISCONTINUED | OUTPATIENT
Start: 2021-01-18 | End: 2021-01-20 | Stop reason: HOSPADM

## 2021-01-18 RX ORDER — ONDANSETRON 4 MG/1
4 TABLET, ORALLY DISINTEGRATING ORAL EVERY 4 HOURS PRN
Status: DISCONTINUED | OUTPATIENT
Start: 2021-01-18 | End: 2021-01-20 | Stop reason: HOSPADM

## 2021-01-18 RX ORDER — FENTANYL CITRATE 50 UG/ML
INJECTION, SOLUTION INTRAMUSCULAR; INTRAVENOUS PRN
Status: DISCONTINUED | OUTPATIENT
Start: 2021-01-18 | End: 2021-01-18 | Stop reason: SDUPTHER

## 2021-01-18 RX ORDER — SODIUM CHLORIDE 9 MG/ML
INJECTION, SOLUTION INTRAVENOUS CONTINUOUS
Status: DISCONTINUED | OUTPATIENT
Start: 2021-01-18 | End: 2021-01-18

## 2021-01-18 RX ORDER — LANOLIN 100 %
OINTMENT (GRAM) TOPICAL PRN
Status: DISCONTINUED | OUTPATIENT
Start: 2021-01-18 | End: 2021-01-20 | Stop reason: HOSPADM

## 2021-01-18 RX ORDER — NALBUPHINE HCL 10 MG/ML
5 AMPUL (ML) INJECTION EVERY 4 HOURS PRN
Status: DISCONTINUED | OUTPATIENT
Start: 2021-01-18 | End: 2021-01-19

## 2021-01-18 RX ORDER — ACETAMINOPHEN 500 MG
1000 TABLET ORAL EVERY 6 HOURS PRN
Status: DISCONTINUED | OUTPATIENT
Start: 2021-01-18 | End: 2021-01-20 | Stop reason: HOSPADM

## 2021-01-18 RX ORDER — DEXTROSE MONOHYDRATE 25 G/50ML
12.5 INJECTION, SOLUTION INTRAVENOUS PRN
Status: DISCONTINUED | OUTPATIENT
Start: 2021-01-18 | End: 2021-01-20 | Stop reason: HOSPADM

## 2021-01-18 RX ADMIN — FAMOTIDINE 20 MG: 20 TABLET, FILM COATED ORAL at 18:41

## 2021-01-18 RX ADMIN — INSULIN LISPRO 6 UNITS: 100 INJECTION, SOLUTION INTRAVENOUS; SUBCUTANEOUS at 17:29

## 2021-01-18 RX ADMIN — SODIUM CHLORIDE: 9 INJECTION, SOLUTION INTRAVENOUS at 16:49

## 2021-01-18 RX ADMIN — FENTANYL CITRATE 25 MCG: 50 INJECTION, SOLUTION INTRAMUSCULAR; INTRAVENOUS at 17:10

## 2021-01-18 RX ADMIN — SODIUM CHLORIDE, POTASSIUM CHLORIDE, SODIUM LACTATE AND CALCIUM CHLORIDE: 600; 310; 30; 20 INJECTION, SOLUTION INTRAVENOUS at 16:45

## 2021-01-18 RX ADMIN — Medication 10 MG: at 16:11

## 2021-01-18 RX ADMIN — Medication 8 ML/HR: at 16:59

## 2021-01-18 RX ADMIN — MEASLES, MUMPS, AND RUBELLA VIRUS VACCINE LIVE 0.5 ML: 1000; 12500; 1000 INJECTION, POWDER, LYOPHILIZED, FOR SUSPENSION SUBCUTANEOUS at 22:11

## 2021-01-18 RX ADMIN — FENTANYL CITRATE 25 MCG: 50 INJECTION INTRAMUSCULAR; INTRAVENOUS at 16:58

## 2021-01-18 RX ADMIN — ROPIVACAINE HYDROCHLORIDE 8 ML/HR: 2 INJECTION, SOLUTION EPIDURAL; INFILTRATION; PERINEURAL at 17:10

## 2021-01-18 RX ADMIN — Medication 95 MILLI-UNITS/MIN: at 21:07

## 2021-01-18 RX ADMIN — DOCUSATE SODIUM 100 MG: 100 CAPSULE, LIQUID FILLED ORAL at 22:10

## 2021-01-18 RX ADMIN — Medication 1 MILLI-UNITS/MIN: at 13:38

## 2021-01-18 RX ADMIN — SODIUM CHLORIDE: 9 INJECTION, SOLUTION INTRAVENOUS at 19:27

## 2021-01-18 RX ADMIN — SODIUM CHLORIDE: 9 INJECTION, SOLUTION INTRAVENOUS at 11:04

## 2021-01-18 ASSESSMENT — PAIN SCALES - GENERAL: PAINLEVEL_OUTOF10: 10

## 2021-01-18 ASSESSMENT — LIFESTYLE VARIABLES: SMOKING_STATUS: 0

## 2021-01-18 ASSESSMENT — ENCOUNTER SYMPTOMS
SHORTNESS OF BREATH: 0
STRIDOR: 0

## 2021-01-18 NOTE — DISCHARGE SUMMARY
Obstetric Discharge Summary  [facility]    Patient Name: Gilson Mendes  Patient : 1980  Primary Care Physician: No primary care provider on file. Admit Date: 2021    Principal Diagnosis: IUP at 39w2d, admitted for IOL 2/2 NRNST in the office today [facility]    Her pregnancy has been complicated by:   Patient Active Problem List   Diagnosis    Insulin controlled gestational diabetes mellitus (GDM) in third trimester    Marijuana use    Hx GDMA1 (G4)    Multigravida of advanced maternal age in third trimester    Heartburn during pregnancy in second trimester    Need for Tdap vaccination    Rh negative state in antepartum period, third trimester    Elevated BP x1    High-risk pregnancy in third trimester    39 weeks gestation of pregnancy    Dilated fetal bowel (NIPT wnl)    Coxsackie + Antibody serologies    Parvovirus exposure (IgG/IgM + serologies)    FHx CHD (previous child with ASD)     21 M Apg 8/9 Wt 7#10       Infection Present?: No  Hospital Acquired: No    Surgical Operations & Procedures:  [x] Pitocin Induction of Labor  [] Pitocin Augmentation of Labor  [] Prostaglandin Induction of Labor  [] Mechanical Induction of Labor  [x] Artificial Rupture of Membranes  [] Intrauterine Pressure Catheter  [] Fetal Scalp Electrode  [] Amnioinfusion  Analgesia: epidural  Delivery Type: Spontaneous Vaginal Delivery: See Labor and Delivery Summary   Laceration(s): None    Consultations: Anesthesia    Pertinent Findings & Procedures:   Gilson Mendes is a 47 y.o. female [id number] at 39w2d admitted for IOL 2/2 NRNST in the office today with GDMA2; received pitocin per procotol, nubain x1, epidural, AROM (clear fluid). She delivered by induced vaginal a Live Born infant on 21. Information for the patient's :  Kelly Mcgregor [[id number]]   male   Birth Weight: 7 lb 10 oz (3.459 kg)     Apgars: 8 at 1 minute and 9 at 5 minutes. Insulin Aspart FlexPen 100 UNIT/ML Sopn     Lancets Misc  1 each by Does not apply route 2 times daily     NovoLIN N FlexPen ReliOn 100 UNIT/ML injection pen  Generic drug: insulin NPH           Where to Get Your Medications      You can get these medications from any pharmacy    Bring a paper prescription for each of these medications  · docusate sodium 100 MG capsule  · ferrous sulfate 325 (65 Fe) MG EC tablet  · ibuprofen 800 MG tablet          Activity: pelvic rest x 6 weeks  Diet: regular diet  Follow up: 2 weeks     Condition on discharge: stable    Discharge date: 1/20/2021    Shravan Brooks DO  Ob/Gyn Resident    Comments:  Home care and follow-up care were reviewed. Pelvic rest, and birth control were reviewed. Signs and symptoms of mastitis and post partum depression were reviewed. The patient is to notify her physician if any of these occur. The patient was counseled on secondary smoke risks and the increased risk of sudden infant death syndrome and respiratory problems to her baby with exposure. She was counseled on various alternate recommendations to decrease the exposure to secondary smoke to her children.

## 2021-01-18 NOTE — PROGRESS NOTES
Labor Progress Note    Karen Nunes is a 47 y.o. female [id number] at 39w2d  The patient was seen and examined. Her pain is not well controlled and patient is requesting nubain x1. She reports fetal movement is present, complains of contractions, denies loss of fluid, denies vaginal bleeding. Vital Signs:  Vitals:    01/18/21 1413 01/18/21 1443 01/18/21 1513 01/18/21 1543   BP: 118/69 105/61 (!) 105/59 130/80   Pulse: 90 80 75 75   Resp: 16 16 16 16   Temp:  97.3 °F (36.3 °C) 97.2 °F (36.2 °C) 97 °F (36.1 °C)   TempSrc: Infrared Infrared Infrared Infrared   SpO2:    99%   Weight:       Height:             FHT: 135, moderate variability, accelerations present, two late decelerations vs variable decelerations with spontaneous return to baseline, but overall Cat I FHT  Contractions: irregular, every 2-7 minutes  Vona Units: not able to be calculated    Chaperone for Intimate Exam: Chaperone was present for entire exam, Chaperone Name: Parminder Mehta RN  Cervical Exam: 3 dilated, 70% effaced, -1 station (out of 3 station)  Pitocin: @ 4 mu/min    Membranes: Intact  Scalp Electrode in place: absent  Intrauterine Pressure Catheter in Place: absent    Interventions: Will give nubain x1 now    Assessment/Plan:  Karen Nunes is a 47 y.o. female [id number] at 39w2d here for IOL 2/2 NRNST in office with GDMA2   - GBS negative, No indication for GBS prophylaxis   - Overall Cat I FHT, TOCO irregular q2-7min   - MVUs: not able to be calculated   - SVE 3/70/-1 (out of 3 station)   - VSS   - Continue pitocin per protocol   - Will give nubain x 1   - Continue F&2hr PP blood sugars   - Novolog 0/0/6U with meals and 22U NPH nightly ordered    Anemia (10.4)   - Pt denies any s/s anemia    Dr. Joaquín Madrigal updated and in agreement with plan.     Lisa Cohen DO  Ob/Gyn Resident  1/18/2021, 3:58 PM

## 2021-01-18 NOTE — PROGRESS NOTES
Karla Shah is a 47 y.o. female 39w2d    [id number]    OB History    Para Term  AB Living   5 3 3   1 3   SAB TAB Ectopic Molar Multiple Live Births   1         3      # Outcome Date GA Lbr Joel/2nd Weight Sex Delivery Anes PTL Lv   5 Current            4 Term 17 39w0d 00:38 / 00:05 6 lb 14.1 oz (3.12 kg) M Vag-Spont EPI N PALLAVI   3 SAB            2 Term 10/12/10 39w0d  7 lb 7 oz (3.374 kg) F Vag-Spont EPI N PALLAVI   1 Term 08 39w0d  7 lb 14 oz (3.572 kg) F Vag-Spont EPI N PALLAVI       Chief Complaint   Patient presents with    Routine Prenatal Visit     Patient is 39 weeks and 2 days gestation and is here for supervision of high risk pregnancy.  High Risk Pregnancy    Gestational Diabetes    Non-stress Test        Blood pressure 135/89, pulse 96, temperature 97.6 °F (36.4 °C), temperature source Temporal, weight 178 lb (80.7 kg), last menstrual period 2020, not currently breastfeeding. The patient was seen and evaluated. There was positive fetal movements. No contractions or leakage of fluid. Signs and symptoms of labor were reviewed. The S/S of Pre-Eclampsia were reviewed with the patient in detail. She is to report any of these if they occur. She currently denies any of these. The patient was instructed on fetal kick counts and was given a kick sheet to complete every 8 hours. She was instructed that the baby should move at a minimum of ten times within one hour after a meal. The patient was instructed to lay down on her left side twenty minutes after eating and count movements for up to one hour with a target value of ten movements. She was instructed to notify the office if she did not make that target after two attempts or if after any attempt there was less than four movements. The patient reports that the targets have been made Yes. Allergies: Allergies as of 2021    (No Known Allergies)       Group Beta Strep collection was completed.  Yes    The patient was found to be GBS: negative    Cervical Exam was:   2 cm dilated, 60%    She has been instructed to call the office at anytime prior to going into the hospital so the on-call physician may direct her to the appropriate facility for care. Exceptions were reviewed including but not limited to: Decreased fetal movement, vaginal Bleeding or hemorrhage, trauma, readily expectant delivery, or any instance where she feels 911 should be utilized. Patient Active Problem List    Diagnosis Date Noted    39 weeks gestation of pregnancy 01/18/2021    37 weeks gestation of pregnancy 01/04/2021    36 weeks gestation of pregnancy 12/28/2020    High-risk pregnancy in third trimester 11/25/2020    Elevated BP x1 11/24/2020     Overview Note:     11/16/20: 131/91 at 30w2d      32 weeks gestation of pregnancy 11/23/2020    Rh negative state in antepartum period, third trimester 11/16/2020    Need for Tdap vaccination 10/26/2020    Heartburn during pregnancy in second trimester 09/28/2020    Multigravida of advanced maternal age in third trimester 08/11/2020    History of gestational diabetes mellitus (GDM) 06/30/2020    Marijuana use 07/26/2017     Overview Note:     UDS + THC on 2/22/17      Insulin controlled gestational diabetes mellitus (GDM) in third trimester 07/11/2017     Overview Note:     NPH 10 U nightly - Started by MFM on 11/24/20      HRP (high risk pregnancy), second trimester 07/11/2017        Diagnosis Orders   1. 39 weeks gestation of pregnancy     2. High-risk pregnancy in third trimester     3. Multigravida of advanced maternal age in third trimester         Patient with non-reactive NST today. Fetal heart rate per NST baseline is 145 bpm and fundal height is 38 cm. today. Patient advised to continue taking prenatal vitamins and to rest as necessary. Patient sent to [facility] for induction. The patient will return to the office for her next visit postpartum.  See antepartum flow sheet.     I, Leigh Ann Delarosa, am scribing for, and in the presence of Dr. Zulma Lewis. Electronically signed by: Leigh Ann Delarosa 1/18/21 9:55 AM   I agree to the above documentation placed by my scribe Leigh Ann Delarosa. I reviewed the scribe's note and agree with the documented findings and plan of care. Any areas of disagreement are noted on the chart. I have personally evaluated this patient. Additional findings are as noted. I agree with the chief complaint, past medical history, past surgical history, allergies, medications, social and family history as documented unless otherwise noted below.      Electronically signed by Zulma Lewis MD on 1/18/2021 at 7:32 PM

## 2021-01-18 NOTE — ANESTHESIA PROCEDURE NOTES
Epidural Block    Patient location during procedure: OB  Start time: 1/18/2021 4:45 PM  End time: 1/18/2021 5:10 PM  Reason for block: labor epidural  Staffing  Performed: anesthesiologist   Anesthesiologist: Danae Flores MD  Preanesthetic Checklist  Completed: patient identified, IV checked, site marked, risks and benefits discussed, surgical consent, monitors and equipment checked, pre-op evaluation, timeout performed, anesthesia consent given, oxygen available and patient being monitored  Epidural  Patient position: sitting  Prep: Betadine  Patient monitoring: cardiac monitor, continuous pulse ox and frequent blood pressure checks  Approach: midline  Location: lumbar (1-5)  Injection technique: ERA saline  Provider prep: sterile gloves and mask  Needle  Needle type: Tuohy   Needle gauge: 17 G  Needle length: 3.5 in  Needle insertion depth: 6 cm  Catheter type: end hole  Catheter size: 19 G  Catheter at skin depth: 11 cm  Test dose: negative  Assessment  Sensory level: T10  Hemodynamics: stable  Attempts: 3+

## 2021-01-18 NOTE — ANESTHESIA PRE PROCEDURE
Department of Anesthesiology  Preprocedure Note       Name:  India Correa   Age:  47 y.o.  :  1980                                          MRN:  [id number]         Date:  2021      Surgeon: Ced Davis    Procedure:Epidural    Medications prior to admission:   Prior to Admission medications    Medication Sig Start Date End Date Taking? Authorizing Provider   Insulin Aspart FlexPen 100 UNIT/ML SOPN INJECT 6 UNITS SUBCUTANEOUSLY BEFORE SUPPER 20   Historical Provider, MD Randi Howard 100 UNIT/ML injection pen Inject 22 Units into the skin nightly  20   Historical Provider, MD FERRERA UF III MINI PEN NEEDLES 31G X 5 MM MISC Inject 1 Dose as directed nightly 20   Historical Provider, MD   blood glucose monitor kit and supplies Dispense sufficient amount for indicated testing frequency plus additional to accommodate PRN testing needs. Dispense all needed supplies to include: monitor, strips, lancing device, lancets, control solutions, alcohol swabs. 20   Nabil Pack, APRN - CNP   Lancets MISC 1 each by Does not apply route 2 times daily 20   Nabil Pack, APRN - CNP   blood glucose monitor strips Test 4 times a day & as needed for symptoms of irregular blood glucose. Dispense sufficient amount for indicated testing frequency plus additional to accommodate PRN testing needs.  20   Nabil Pack, APRN - CNP   famotidine (PEPCID) 20 MG tablet Take 1 tablet by mouth 2 times daily 20   Karen Tobar MD   senna (SENOKOT) 8.6 MG tablet Take 1 tablet by mouth daily as needed for Constipation  Patient not taking: Reported on 2020  Nabil Pack APRN - CNP   promethazine (PHENERGAN) 25 MG tablet Take 1 tablet by mouth every 8 hours as needed for Nausea  Patient not taking: Reported on 2020   Nabil Pack, APRN - CNP Prenatal Vit-Fe Fumarate-FA (PNV PRENATAL PLUS MULTIVITAMIN) 27-1 MG TABS Take 1 tablet by mouth daily 5/28/20 5/23/21  VIKAS Lezama CNP       Current medications:    Current Facility-Administered Medications   Medication Dose Route Frequency Provider Last Rate Last Admin    0.9 % sodium chloride infusion   Intravenous Continuous Xenia Astrid,  mL/hr at 01/18/21 1104 New Bag at 01/18/21 1104    famotidine (PEPCID) tablet 20 mg  20 mg Oral BID Xenia Astrid, DO        sodium chloride flush 0.9 % injection 10 mL  10 mL Intravenous 2 times per day [facility], DO        sodium chloride flush 0.9 % injection 10 mL  10 mL Intravenous PRN Xenia Astrid, DO        lidocaine PF 1 % injection 30 mL  30 mL Other PRN Xenia Astrid, DO        ondansetron (ZOFRAN) injection 4 mg  4 mg Intravenous Q6H PRN Xenia Astrid, DO        diphenhydrAMINE (BENADRYL) tablet 25 mg  25 mg Oral Q4H PRN Xenia Astrid, DO        acetaminophen (TYLENOL) tablet 1,000 mg  1,000 mg Oral Q6H PRN Xenia Astrid, DO        insulin lispro (HUMALOG) injection vial 6 Units  6 Units Subcutaneous Dinner [facility], DO        insulin NPH (HUMULIN N;NOVOLIN N) injection vial 22 Units  22 Units Subcutaneous Nightly Xenia Astrid, DO        glucose (GLUTOSE) 40 % oral gel 15 g  15 g Oral PRN Xenia Astrid, DO        dextrose 50 % IV solution  12.5 g Intravenous PRN Xenia Astrid, DO        glucagon (rDNA) injection 1 mg  1 mg Intramuscular PRN Xenia Astrid, DO        dextrose 5 % solution  100 mL/hr Intravenous PRN Xenia Astrid, DO        oxytocin (PITOCIN) 30 units in 500 mL infusion  1 tasneem-units/min Intravenous Continuous Xenia Astrid, DO 6 mL/hr at 01/18/21 1554 6 tasneem-units/min at 01/18/21 1554    naloxone (NARCAN) injection 0.4 mg  0.4 mg Intravenous PRN Kanwal Limon MD        nalbuphine (NUBAIN) injection 5 mg  5 mg Intravenous Q4H PRN Kanwal Limon MD  ondansetron (ZOFRAN) injection 4 mg  4 mg Intravenous Q6H PRN Morgan Pascual MD        fentaNYL 2 mcg/mL and ropivacaine 0.2% in sodium chloride 0.9% 100 mL (OB) epidural  8 mL/hr Epidural Continuous Morgan Pascual MD        fentaNYL (SUBLIMAZE) injection 25 mcg  25 mcg Intravenous Q1H PRN Morgan Clamp, MD        ePHEDrine injection 10 mg  10 mg Intravenous Once Morgan Clamp, MD           Allergies:  No Known Allergies    Problem List:    Patient Active Problem List   Diagnosis Code    Insulin controlled gestational diabetes mellitus (GDM) in third trimester O22.5    Marijuana use F12.90    Hx GDMA1 (G4) Z80.34    Multigravida of advanced maternal age in third trimester O09.523    Heartburn during pregnancy in second trimester O26.892, R12    Need for Tdap vaccination Z22    Rh negative state in antepartum period, third trimester O26.893, Z67.91    Elevated BP x1 R03.0    High-risk pregnancy in third trimester O09.93    39 weeks gestation of pregnancy Z3A.39    Dilated fetal bowel (NIPT wnl) O28.3    Coxsackie + Antibody serologies B34.1    Parvovirus exposure (IgG/IgM + serologies) Z20.828    FHx CHD (previous child with ASD) Z82.79       Past Medical History:        Diagnosis Date    Diabetes mellitus ([facility] [address] 2017    gestational diabetes     Rh incompatibility     Rh sensitized        Past Surgical History:        Procedure Laterality Date    LEG SURGERY N/A     WISDOM TOOTH EXTRACTION         Social History:    Social History     Tobacco Use    Smoking status: Former Smoker    Smokeless tobacco: Never Used   Substance Use Topics    Alcohol use: Yes     Comment: Rarely                                Counseling given: Not Answered      Vital Signs (Current):   Vitals:    01/18/21 1443 01/18/21 1513 01/18/21 1543 01/18/21 1613   BP: 105/61 (!) 105/59 130/80 120/78   Pulse: 80 75 75 68   Resp: 16 16 16 16 Temp: 97.3 °F (36.3 °C) 97.2 °F (36.2 °C) 97 °F (36.1 °C) 97 °F (36.1 °C)   TempSrc: Infrared Infrared Infrared Infrared   SpO2:   99%    Weight:       Height:                                                  BP Readings from Last 3 Encounters:   01/18/21 120/78   01/18/21 135/89   01/13/21 126/76       NPO Status:                                                                                 BMI:   Wt Readings from Last 3 Encounters:   01/18/21 178 lb (80.7 kg)   01/18/21 178 lb (80.7 kg)   01/13/21 176 lb 8 oz (80.1 kg)     Body mass index is 32.56 kg/m².     CBC:   Lab Results   Component Value Date    WBC 7.2 01/18/2021    RBC 3.71 01/18/2021    HGB 10.4 01/18/2021    HCT 31.0 01/18/2021    MCV 83.4 01/18/2021    RDW 14.0 01/18/2021     01/18/2021         CMP:   Lab Results   Component Value Date     09/05/2018    K 4.0 09/05/2018     09/05/2018    CO2 26 09/05/2018    BUN 12 09/05/2018    CREATININE 0.80 09/05/2018    GFRAA >60 09/05/2018    LABGLOM >60 09/05/2018    GLUCOSE 142 10/28/2020    GLUCOSE 107 09/05/2018    PROT 7.5 09/05/2018    CALCIUM 9.2 09/05/2018    BILITOT 0.60 09/05/2018    ALKPHOS 104 09/05/2018    AST 11 09/05/2018    ALT 10 09/05/2018       POC Tests:   Recent Labs     01/18/21  1350   POCGLU 96       Coags: No results found for: PROTIME, INR, APTT    HCG (If Applicable):   Lab Results   Component Value Date    PREGTESTUR POSITIVE 05/28/2020    HCGQUANT 13,335 (H) 05/28/2020        ABGs: No results found for: PHART, PO2ART, UTB8QBI, QTR6ZMI, BEART, O8MPXHUU     Type & Screen (If Applicable):  No results found for: LABABO, LABRH    Drug/Infectious Status (If Applicable):  No results found for: HIV, HEPCAB    COVID-19 Screening (If Applicable):   Lab Results   Component Value Date    COVID19 Not Detected 01/14/2021         Anesthesia Evaluation  Patient summary reviewed and Nursing notes reviewed no history of anesthetic complications:   Airway: Mallampati: II TM distance: >3 FB     Mouth opening: > = 3 FB Dental: normal exam         Pulmonary:Negative Pulmonary ROS and normal exam  breath sounds clear to auscultation      (-) pneumonia, COPD, asthma, shortness of breath, recent URI, sleep apnea, rhonchi, wheezes, rales, stridor and not a current smoker          Patient did not smoke on day of surgery. Cardiovascular:Negative CV ROS  Exercise tolerance: good (>4 METS),       (-) pacemaker, hypertension, valvular problems/murmurs, past MI, CAD, CABG/stent, dysrhythmias,  angina,  CHF, orthopnea, PND,  GRAY, murmur, weak pulses,  friction rub, systolic click, carotid bruit,  JVD and peripheral edema    ECG reviewed  Rhythm: regular  Rate: normal           Beta Blocker:  Not on Beta Blocker         Neuro/Psych:   Negative Neuro/Psych ROS     (-) seizures, neuromuscular disease, TIA, CVA, headaches, psychiatric history and depression/anxiety            GI/Hepatic/Renal: Neg GI/Hepatic/Renal ROS       (-) hiatal hernia, GERD, PUD, hepatitis, liver disease, no renal disease, bowel prep and no morbid obesity       Endo/Other:    (+) Diabetes, no malignancy/cancer. (-) hypothyroidism, hyperthyroidism, blood dyscrasia, arthritis, no electrolyte abnormalities, no malignancy/cancer                ROS comment: GDM Abdominal:           Vascular: negative vascular ROS. - PVD, DVT and PE. Anesthesia Plan      spinal and epidural     ASA 2       Induction: intravenous. MIPS: Postoperative opioids intended and Prophylactic antiemetics administered. Anesthetic plan and risks discussed with patient. Plan discussed with CRNA.                   Sonya Preciado MD   1/18/2021

## 2021-01-18 NOTE — FLOWSHEET NOTE
Patient arrived on L&D unit for a scheduled induction. Patient with a nonreactive NST in office, hx GDM. Patient reports +FM at this time. Denies vaginal bleeding, denies leaking of fluid. Oriented to room and call system. Given hospital gown and instructed on clean catch urine specimen. Verbally consents to universal maternal drug screen.     Dr. Gloria Mike notified of patients arrival.

## 2021-01-18 NOTE — PROGRESS NOTES
Labor Progress Note    Maciel Walton is a 47 y.o. female [id number] at 39w2d  The patient was seen and examined. Her pain is well controlled. She reports fetal movement is present, complains of contractions, denies loss of fluid, denies vaginal bleeding. The patient is comfortable with epidural in place.        Vital Signs:  Vitals:    01/18/21 1739 01/18/21 1743 01/18/21 1744 01/18/21 1749   BP:  128/80     Pulse:  83     Resp:  16     Temp:  97.2 °F (36.2 °C)     TempSrc:  Infrared     SpO2: 98%  97% 97%   Weight:       Height:            FHT: 120, moderate variability, accelerations present, decelerations absent  Contractions: regular, every 2-5 minutes    Chaperone for Intimate Exam: Chaperone was present for entire exam, Chaperone Name: Dalton Wahl RN  Cervical Exam: 3 cm dilated, 70 effaced, -1 station  Pitocin: @ 6 mu/min    Membranes: Intact  Scalp Electrode in place: absent  Intrauterine Pressure Catheter in Place: absent    Interventions: SVE performed    Assessment/Plan:  Maciel Walton is a 47 y.o. female [id number] at 39w2d admitted for IOL 2/2 NRNST in the office   - GBS negative, No indication for GBS prophylaxis   - VSS, afebrile   - cEFM/TOCO   - S/p nubain x1   - Continue pitocin per protocol   - Patient comfortable with epidural in place   - Continue to monitor closely    [address]   - Patient currently on Novolog 0/0/6 with meals and NPH 22U qHS   - Insulin held, clear liquid diet   - FBS and 2hr PP    - Pt will need active orders    Attending updated and in agreement with plan    Sylvester Baeza DO  Ob/Gyn Resident  1/18/2021, 5:51 PM

## 2021-01-18 NOTE — H&P
OBSTETRICAL HISTORY [address]    Date: 2021       Time: 11:13 AM   Patient Name: Maciel Walton     Patient : 1980  Room/Bed: [address] [place] [address]    Admission Date/Time: 2021 10:25 AM      CC: IOL 2/2 NRNST in the office today. HPI: Maciel Walton is a 47 y.o. [id number] at 39w2d who presents for IOL 2/2 NRNST in the office today. The patient reports fetal movement is present, complains of contractions that are irregular since this morning, denies loss of fluid, denies vaginal bleeding. Patient denies headache, vision changes, nausea, vomiting, fever, chills, shortness of breath, chest pain, RUQ pain, abdominal pain, diarrhea, change in color/amount/odor of vaginal discharge, dysuria or, hematuria. She is currently on Novolog 0/0/6U with meals and 22U NPH nightly and fasting blood sugars have been 80-90s and 2 hour PP blood sugars have been 110s. Pregnancy is complicated by AMA (NIPT wnl), GDMA2, Dilated fetal bowel, Coxsackie + Ab/Parvo IgG/IgM+, Polyhydramnios (resolved), Rh negative status, Elevated BP x1, Hx GDMA1 (G4), FHx CHD (previous child with ASD), THC use, Former smoker    DATING:  LMP: Patient's last menstrual period was 2020 (approximate).   Estimated Date of Delivery: 21   Based on: early ultrasound, at 7 2/7 weeks GA    PREGNANCY RISK FACTORS:  Patient Active Problem List   Diagnosis    Insulin controlled gestational diabetes mellitus (GDM) in third trimester    HRP (high risk pregnancy), second trimester    Marijuana use    History of gestational diabetes mellitus (GDM)    Multigravida of advanced maternal age in third trimester    Heartburn during pregnancy in second trimester    Need for Tdap vaccination    Rh negative state in antepartum period, third trimester    32 weeks gestation of pregnancy    Elevated BP x1    High-risk pregnancy in third trimester    36 weeks gestation of pregnancy  37 weeks gestation of pregnancy    39 weeks gestation of pregnancy        Steroids Given In This Pregnancy:  no     REVIEW OF SYSTEMS:   Constitutional: negative fever, negative chills, negative weight changes   HEENT: negative visual disturbances, negative headaches, negative dizziness  Breast: negative breast abnormalities, negative breast lumps, negative nipple discharge  Respiratory: negative dyspnea, negative cough, negative SOB  Cardiovascular: negative chest pain,  negative palpitations, negative arrhythmia, negative syncope   Gastrointestinal: positive abdominal pain, negative RUQ pain, negative N/V, negative diarrhea, negative constipation, negative bowel changes  Genitourinary: negative dysuria, negative hematuria, negative urinary incontinence, negative vaginal discharge, negative vaginal bleeding  Dermatological: negative rash, negative pruritis, negative mole or other skin changes  Hematologic: negative bruising, negative personal/family history of DVT/PE  Immunologic/Lymphatic: negative recent illness, negative recent sick contact  Musculoskeletal: negative back pain, negative myalgias, negative arthralgias  Neurological:  negative dizziness, negative migraines, negative seizures, negative weakness  Behavior/Psych: negative depression, negative anxiety, negative SI, negative HI    OBSTETRICAL HISTORY:   OB History    Para Term  AB Living   5 3 3 0 1 3   SAB TAB Ectopic Molar Multiple Live Births   1 0 0 0 0 3      # Outcome Date GA Lbr Joel/2nd Weight Sex Delivery Anes PTL Lv   5 Current            4 Term 17 39w0d 00:38 / 00:05 6 lb 14.1 oz (3.12 kg) M Vag-Spont EPI N PALLAVI      Name: Enid Hu: 8  Apgar5: 8   3 2015           2 Term 10/12/10 39w0d  7 lb 7 oz (3.374 kg) F Vag-Spont EPI N PALLAVI   1 Term 08 39w0d  7 lb 14 oz (3.572 kg) F Vag-Spont EPI N PALLAVI       PAST MEDICAL HISTORY: Prenatal Vit-Fe Fumarate-FA (PNV PRENATAL PLUS MULTIVITAMIN) 27-1 MG TABS Take 1 tablet by mouth daily 5/28/20 5/23/21  Fallon Abiel, APRN - CNP       FAMILY HISTORY:  family history includes Cancer in her maternal grandfather. SOCIAL HISTORY:   reports that she has quit smoking. She has never used smokeless tobacco. She reports current alcohol use. She reports that she does not use drugs.     VITALS:  Vitals:    01/18/21 1043   BP: 133/83   Pulse: 107   Resp: 16   Temp: 97.5 °F (36.4 °C)   TempSrc: Infrared         PHYSICAL EXAM:  Fetal Heart Monitor:  Baseline Heart Rate 140, moderate variability, present accelerations, intermittent late decelerations but overall Cat I FHT  [place]: irregular contractions q 5-9min    General appearance:  no apparent distress, alert and cooperative  HEENT: head atraumatic, normocephalic, moist mucous membranes, trachea midline  Neurologic:  alert, oriented, normal speech, no focal findings or movement disorder noted  Lungs:  No increased work of breathing, good air exchange, clear to auscultation bilaterally, no crackles or wheezing  Heart:  regular rate and rhythm and no murmur, rubs, gallops  Abdomen:  soft, gravid, no rebound, guarding, rigidity, non-tender, no right upper quadrant tenderness, uterus non-tender, no signs of abruption and no signs of chorioamnionitis  Extremities:  no calf tenderness, non edematous, DTRs: normal  Musculoskeletal: Gross strength equal and intact throughout, CVA tenderness: none  Psychiatric: Mood appropriate, normal affect   Rectal Exam: not indicated   Pelvic exam:  Chaperone for Intimate Exam: Chaperone was present for entire exam, Chaperone Name: Jai Silva RN    Sterile Vaginal Exam:  Cervix: 2 cm dilated, 70 % effaced, -1 station, mid position (out of 3 station), soft consistency, FETAL POSITION: Cephalic (confirmed by ultrasound), Membranes intact,    Bishops Score: 8     0 1 2 3   Position Posterior Intermediate Anterior - Consistency Firm Intermediate Soft -   Effacement 0-30% 31-50% 51-80% >80%   Dilation 0cm 1-2cm 3-4cm >5cm   Fetal Station -3 -2 -1, 0 +1, +2       OMM Structural Exam:  Chief Complaint:  Pregnancy    Anterior/ Posterior Spinal Curves: Lumbar Lordosis -  Increased  Scoliosis (Lateral Spinal Curves): None  Assessment Tool:  T= Tenderness, A= Asymmetry, R= Restricted Motion (A=Active, P=Passive), T=Tissue Texture Changes  Region Evaluated : Severity / Specific of Major Somatic Dysfunction  M99.03 Lumbar -  Minor TART - more than BG levels -   Major Correlations with:  Genitourinary  Structural Diagnosis: Increased lumbar lordosis  Treatment Plan: Outpatient     LIMITED BEDSIDE US:  Position: Cephalic  Placental Location: anterior  Fetal Heart Tones: Present  Fetal Movement: Present  Amniotic Fluid Index/Volume: Adequate 2x2cm vertical pocket  Estimated Fetal Weight:  7 lbs 7oz    PRENATAL LAB RESULTS:   Blood Type/Rh: AB neg  Antibody Screen: negative  Hemoglobin, Hematocrit, Platelets: 90.5 / 94.6 / 328  Rubella: equivocal  T.  Pallidum, IgG: non-reactive   Hepatitis B Surface Antigen: non-reactive   HIV: non-reactive   Sickle Cell Screen: negative  Gonorrhea: negative  Chlamydia: negative  Urine culture: negative, date: 20    Early 1 hour Glucose Tolerance Test: 103  Early 3 hour Glucose Tolerance Test: not done  1 hour Glucose Tolerance Test: 142  3 hour Glucose Tolerance Test: Fastin; 1 hour: 183; 2 hour  180; 3 hour: 99    Group B Strep: PCR negative on 20  Cystic Fibrosis Screen: negative  First Trimester Screen: not done  MSAFP/Multiple Markers: Quad screen with increased risk of Tri 18 from 1:500 to 1:364 overall low risk  Non-Invasive Prenatal Testing: no aneuploidy detected  Anatomy US: cephalic presentation, anterior placenta, 3VC, normal anatomy except for dilated fetal bowel, polyhydramnios with LAKE 25.71cm  Fetal echocardiogram (20): no structural anomalies    ASSESSMENT & PLAN: Kelsey Ortega is a 47 y.o. female [id number] at 39w2d IUP   - GBS negative / Rh negative / R equivocal   - Will need Rhogam w/u PP and MMR PP   - No indication for GBS prophylaxis   - COVID 19 negative on 1/14/21    IOL 2/2 NRNST in the office today   - ATSO Dr. Elo Guallpa   - CBC, TPall, T&S   - UDS R/B/A discussed, consent obtained and in chart   - Circumcision desired   - Overall Cat I FHT, TOCO contractions q 5-9 min   - SVE: 2/70/-1 (out of 3 station)   - BSUS: cephalic, anterior placenta, EFW 7#7   - Mode of induction: pitocin per protocol   - Pt desires nubain and then epidural for pain control    AMA (NIPT wnl)    Elevated BP x1   - 131/91 on 11/16/20   - BP normotensive today   - Pt denies any s/s PreE    GDMA2   - Novolog 0/0/6U with meals and 22U NPH nightly    - Will obtain F and 2 hour PP blood sugars and will need active orders of blood sugars q 1-2hours when in active labor   - POCT glucose on admission 87    Coxsackie + Ab/Parvo IgG/IgM+   - Coxsackie B2Ab 1:160, B3Ab 1:80, B4Ab 1:40   - CMV IgG+, IgM negative   - Toxo IgG/IgM negative   - Peds ID referral sent but patient reports she did not feel she needed to meet with them   - Fetal echocardiogram WNL   - She reports exposure to Parvo in her G4 pregnancy and her son has liver calcifications that they are monitoring closely    Polyhydramnios (rslvd)   - Last MFM scan on 1/13/21: LAKE 16.64cm   - BSUS shows no evidence of polyhydramnios     Hx GDMA1 (G4)   - Early 1hr GTT WNL    FHx CHD (previous child with ASD)   - Fetal echocardiogram WNL    THC use   - UDS on 6/30/20 with +THC   - Patient agreeable for UDS on admission    Former smoker   - Encouraged continued cessation    BMI 32      Patient Active Problem List    Diagnosis Date Noted    39 weeks gestation of pregnancy 01/18/2021    High-risk pregnancy in third trimester 11/25/2020    Elevated BP x1 11/24/2020 11/16/20: 131/91 at 30w2d  Rh negative state in antepartum period, third trimester 11/16/2020    Need for Tdap vaccination 10/26/2020    Heartburn during pregnancy in second trimester 09/28/2020    Multigravida of advanced maternal age in third trimester 08/11/2020    History of gestational diabetes mellitus (GDM) 06/30/2020    Marijuana use 07/26/2017     UDS + THC on 2/22/17      Insulin controlled gestational diabetes mellitus (GDM) in third trimester 07/11/2017     NPH 10 U nightly - Started by M on 11/24/20         Plan discussed with Dr. Modesta Gore, who is agreeable. Steroids given this admission: No    Risks, benefits, alternatives and possible complications have been discussed in detail with the patient. Admission, and post admission procedures and expectations were discussed in detail. All questions were answered.     Attending's Name: Dr. Hemal Plata DO  Ob/Gyn Resident  1/18/2021, 11:13 AM

## 2021-01-18 NOTE — FLOWSHEET NOTE
Everyone in room is wearing a mask. [id number] -  Dr. Ginette Vallecillo at bedside and consent form signed. Risks and benefits discussed and patient verbalizes understanding. Patient verbalizes to proceed with procedure. Time out performed. 1647- to dangle position. 16:49P - procedure started. 17:00p  - test dose given . Patient tolerated procedure well. 17:05p -to low fowlers position with left hip wedge in place. 17:15p - epidural pump started. See anesthesia note.

## 2021-01-19 LAB
ABSOLUTE EOS #: 0 K/UL (ref 0–0.4)
ABSOLUTE IMMATURE GRANULOCYTE: ABNORMAL K/UL (ref 0–0.3)
ABSOLUTE LYMPH #: 1.1 K/UL (ref 1–4.8)
ABSOLUTE MONO #: 0.5 K/UL (ref 0.1–1.3)
BASOPHILS # BLD: 0 % (ref 0–2)
BASOPHILS ABSOLUTE: 0 K/UL (ref 0–0.2)
DIFFERENTIAL TYPE: ABNORMAL
EOSINOPHILS RELATIVE PERCENT: 0 % (ref 0–4)
FETAL ROSETTE: NEGATIVE
GLUCOSE BLD-MCNC: 76 MG/DL (ref 65–105)
GLUCOSE BLD-MCNC: 87 MG/DL (ref 65–105)
GLUCOSE BLD-MCNC: 88 MG/DL (ref 65–105)
HCT VFR BLD CALC: 27.1 % (ref 36–46)
HCT VFR BLD CALC: 28.4 % (ref 36–46)
HEMOGLOBIN: 8.7 G/DL (ref 12–16)
HEMOGLOBIN: 9.5 G/DL (ref 12–16)
IMMATURE GRANULOCYTES: ABNORMAL %
LYMPHOCYTES # BLD: 10 % (ref 24–44)
MCH RBC QN AUTO: 26.9 PG (ref 26–34)
MCHC RBC AUTO-ENTMCNC: 32.1 G/DL (ref 31–37)
MCV RBC AUTO: 83.9 FL (ref 80–100)
MONOCYTES # BLD: 5 % (ref 1–7)
NRBC AUTOMATED: ABNORMAL PER 100 WBC
PDW BLD-RTO: 14.1 % (ref 11.5–14.9)
PLATELET # BLD: 233 K/UL (ref 150–450)
PLATELET ESTIMATE: ABNORMAL
PMV BLD AUTO: 8.4 FL (ref 6–12)
RBC # BLD: 3.23 M/UL (ref 4–5.2)
RBC # BLD: ABNORMAL 10*6/UL
SEG NEUTROPHILS: 85 % (ref 36–66)
SEGMENTED NEUTROPHILS ABSOLUTE COUNT: 9.5 K/UL (ref 1.3–9.1)
WBC # BLD: 11.2 K/UL (ref 3.5–11)
WBC # BLD: ABNORMAL 10*3/UL

## 2021-01-19 PROCEDURE — 1220000000 HC SEMI PRIVATE OB R&B

## 2021-01-19 PROCEDURE — 85018 HEMOGLOBIN: CPT

## 2021-01-19 PROCEDURE — 6370000000 HC RX 637 (ALT 250 FOR IP): Performed by: STUDENT IN AN ORGANIZED HEALTH CARE EDUCATION/TRAINING PROGRAM

## 2021-01-19 PROCEDURE — 85025 COMPLETE CBC W/AUTO DIFF WBC: CPT

## 2021-01-19 PROCEDURE — 36415 COLL VENOUS BLD VENIPUNCTURE: CPT

## 2021-01-19 PROCEDURE — 85461 HEMOGLOBIN FETAL: CPT

## 2021-01-19 PROCEDURE — 6360000002 HC RX W HCPCS: Performed by: STUDENT IN AN ORGANIZED HEALTH CARE EDUCATION/TRAINING PROGRAM

## 2021-01-19 PROCEDURE — 85014 HEMATOCRIT: CPT

## 2021-01-19 PROCEDURE — 82947 ASSAY GLUCOSE BLOOD QUANT: CPT

## 2021-01-19 RX ORDER — LANOLIN ALCOHOL/MO/W.PET/CERES
325 CREAM (GRAM) TOPICAL 2 TIMES DAILY
Qty: 60 TABLET | Refills: 3 | Status: SHIPPED | OUTPATIENT
Start: 2021-01-19 | End: 2021-03-02 | Stop reason: ALTCHOICE

## 2021-01-19 RX ADMIN — ACETAMINOPHEN 1000 MG: 500 TABLET, FILM COATED ORAL at 06:42

## 2021-01-19 RX ADMIN — IBUPROFEN 800 MG: 800 TABLET ORAL at 08:21

## 2021-01-19 RX ADMIN — FAMOTIDINE 20 MG: 20 TABLET, FILM COATED ORAL at 20:34

## 2021-01-19 RX ADMIN — IBUPROFEN 800 MG: 800 TABLET ORAL at 16:41

## 2021-01-19 RX ADMIN — DOCUSATE SODIUM 100 MG: 100 CAPSULE, LIQUID FILLED ORAL at 20:34

## 2021-01-19 RX ADMIN — IBUPROFEN 800 MG: 800 TABLET ORAL at 00:18

## 2021-01-19 RX ADMIN — ACETAMINOPHEN 1000 MG: 500 TABLET, FILM COATED ORAL at 14:38

## 2021-01-19 RX ADMIN — HUMAN RHO(D) IMMUNE GLOBULIN 300 MCG: 1500 SOLUTION INTRAMUSCULAR; INTRAVENOUS at 08:56

## 2021-01-19 RX ADMIN — FAMOTIDINE 20 MG: 20 TABLET, FILM COATED ORAL at 08:21

## 2021-01-19 RX ADMIN — DOCUSATE SODIUM 100 MG: 100 CAPSULE, LIQUID FILLED ORAL at 08:21

## 2021-01-19 ASSESSMENT — PAIN SCALES - GENERAL
PAINLEVEL_OUTOF10: 1
PAINLEVEL_OUTOF10: 4
PAINLEVEL_OUTOF10: 3

## 2021-01-19 NOTE — PROGRESS NOTES
Labor Progress Note    Mckenna Fulton is a 47 y.o. female [id number] at 39w2d  The patient was seen and examined. Her pain is well controlled however she feels more pressure. She reports fetal movement is present, complains of contractions, complains of loss of fluid, complains of vaginal bleeding. Small amount of bloody show noted.        Vital Signs:  Vitals:    01/18/21 1854 01/18/21 1913 01/18/21 1943 01/18/21 2013   BP:  130/76 124/84 132/82   Pulse:  94 100 118   Resp:       Temp:       TempSrc:       SpO2: 100%      Weight:       Height:            FHT: 140, moderate variability, accelerations present, some early and variable decelerations noted  Contractions: regular, every 2 minutes    Chaperone for Intimate Exam: RN was present for entire exam  Cervical Exam: 6-7 cm dilated, 90 effaced, 0 station  Pitocin: @ 8 mu/min    Membranes: Ruptured clear fluid  Scalp Electrode in place: absent  Intrauterine Pressure Catheter in Place: absent    Interventions: SVE performed    Assessment/Plan:  Mckenna Fulton is a 47 y.o. female [id number] at 39w2d admitted for IOL 2/2 NRNST in the office   - GBS negative, No indication for GBS prophylaxis   - VSS, afebrile   - cEFM/TOCO   - S/p nubain x1   - Patient comfortable with epidural in place   - AROM (clr) @1940   - Continue pitocin per protocol    GDMA2              - Patient currently on Novolog 0/0/6 with meals and NPH 22U qHS              - Insulin held currently, clear liquid diet ordered              - POCT q4h now    Attending updated and in agreement with plan    Roby Duff DO  Ob/Gyn Resident  1/18/2021, 8:19 PM

## 2021-01-19 NOTE — ANESTHESIA POSTPROCEDURE EVALUATION
POST- ANESTHESIA EVALUATION       Pt Name: Etienne Doty  MRN: [id number]  YOB: 1980  Date of evaluation: 1/19/2021  Time:  5:15 AM      /71   Pulse 92   Temp 98.4 °F (36.9 °C) (Oral)   Resp 16   Ht 5' 2\" (1.575 m)   Wt 178 lb (80.7 kg)   LMP 04/14/2020 (Approximate)   SpO2 90%   Breastfeeding Unknown   BMI 32.56 kg/m²      Consciousness Level  Awake  Cardiopulmonary Status  Stable  Pain Adequately Treated YES  Nausea / Vomiting  NO  Adequate Hydration  YES  Anesthesia Related Complications NONE      Electronically signed by Kinza Ramirez MD on 1/19/2021 at 5:15 AM       Department of Anesthesiology  Postprocedure Note    Patient: Etienne Doty  MRN: [id number]  YOB: 1980  Date of evaluation: 1/19/2021  Time:  5:15 AM     Procedure Summary     Date: 01/18/21 Room / Location:     Anesthesia Start: 0631 Anesthesia Stop: 2035    Procedure: Labor Analgesia Diagnosis:     Scheduled Providers:  Responsible Provider: Kinza Ramirez MD    Anesthesia Type: spinal, epidural ASA Status: 2          Anesthesia Type: spinal, epidural    Boom Phase I: Boom Score: 10    Boom Phase II: Boom Score: 10    Last vitals: Reviewed and per EMR flowsheets.        Anesthesia Post Evaluation

## 2021-01-19 NOTE — LACTATION NOTE
This note was copied from a baby's chart. Lactation round made. Mother states breastfeeding is going well. Mother breastfeed her last child for 1.5 months. Mother has breast pump. Mother states baby nursed well after delivery and denies breast pain. Writer encourages mother to breastfeed every 3 hours and offer the breast. Writer states every 3 hours to skin to skin, hand express and offer the breast. Writer talks about sleepy phase and cluster feeding. Writer encourages mother to put baby to breast when cluster feeding to establish milk supply and avoid nipple confusion. Mother verbalizes understanding. Writer encourages mother to call out for breastfeeding assistance. Handouts given and explained to mother:  Breastfeeding resource Guide; Breastfeeding Log for the first week; Norms in the First 3 days; feeding cues; Baby's second Night; [employer]'s inside track, a resource for breastfeeding mothers: Milk Expression and Pumping; How to Achieve a Deep Latch; Tips for breastfeeding Moms/Daily meal plan; [employer]'s Inside Track, a resource for breastfeeding mother: Managing Your Milk Supply:Going with the Flow;  [employer]'s Inside Track, a resource for breastfeeding mothers: Using Your Hands to Express Your Milk; Signs of a Good Feeding. Discussed handouts with mother verbalizing understanding and encouraged mother  to view video clips.

## 2021-01-19 NOTE — PROGRESS NOTES
Obstetric/Gynecology Resident Interval Note    AM labs reviewed. Hgb decreased from 10.4 on admission to 8.7. Bleeding and vitals remain stable. Will continue to monitor carefully and plan to d/c with Rx for PO iron. Vitals:    01/18/21 2203 01/18/21 2219 01/18/21 2234 01/19/21 0006   BP: 130/77 127/78 127/79 130/81   Pulse: 105 88 95 100   Resp: 16 16 14 14   Temp:   98.9 °F (37.2 °C) 99.4 °F (37.4 °C)   TempSrc:   Oral Oral   SpO2:       Weight:       Height:            Recent Results (from the past 6 hour(s))   CBC auto differential    Collection Time: 01/19/21  1:37 AM   Result Value Ref Range    WBC 11.2 (H) 3.5 - 11.0 k/uL    RBC 3.23 (L) 4.0 - 5.2 m/uL    Hemoglobin 8.7 (L) 12.0 - 16.0 g/dL    Hematocrit 27.1 (L) 36 - 46 %    MCV 83.9 80 - 100 fL    MCH 26.9 26 - 34 pg    MCHC 32.1 31 - 37 g/dL    RDW 14.1 11.5 - 14.9 %    Platelets 215 548 - 698 k/uL    MPV 8.4 6.0 - 12.0 fL    NRBC Automated NOT REPORTED per 100 WBC    Differential Type NOT REPORTED     Immature Granulocytes NOT REPORTED 0 %    Absolute Immature Granulocyte NOT REPORTED 0.00 - 0.30 k/uL    WBC Morphology NOT REPORTED     RBC Morphology NOT REPORTED     Platelet Estimate NOT REPORTED     Seg Neutrophils 85 (H) 36 - 66 %    Lymphocytes 10 (L) 24 - 44 %    Monocytes 5 1 - 7 %    Eosinophils % 0 0 - 4 %    Basophils 0 0 - 2 %    Segs Absolute 9.50 (H) 1.3 - 9.1 k/uL    Absolute Lymph # 1.10 1.0 - 4.8 k/uL    Absolute Mono # 0.50 0.1 - 1.3 k/uL    Absolute Eos # 0.00 0.0 - 0.4 k/uL    Basophils Absolute 0.00 0.0 - 0.2 k/uL        Continue routine postpartum care.     Clinton Castro DO  OB/GYN Resident, PGY2  [facility] [place]  1/19/2021, 2:33 AM

## 2021-01-19 NOTE — PROGRESS NOTES
Labor Progress Note    Etienne Doty is a 47 y.o. female [id number] at 39w2d  The patient was seen and examined. Her pain is well controlled. She reports fetal movement is present, complains of contractions, denies loss of fluid, denies vaginal bleeding. AROM performed, clear fluid noted, patient tolerated well.        Vital Signs:  Vitals:    01/18/21 1848 01/18/21 1849 01/18/21 1854 01/18/21 1913   BP:    130/76   Pulse:    94   Resp:       Temp:       TempSrc:       SpO2: 99% 99% 100%    Weight:       Height:            FHT: 120, moderate variability, accelerations present, decelerations absent  Contractions: regular, every 2-4 minutes    Chaperone for Intimate Exam: RN was present for entire exam  Cervical Exam: 4 cm dilated, 70 effaced, 0 station  Pitocin: @ 8 mu/min    Membranes: Ruptured clear fluid  Scalp Electrode in place: absent  Intrauterine Pressure Catheter in Place: absent    Interventions: AROM performed, clear fluid noted, patient tolerated well    Assessment/Plan:  Etienne Doty is a 47 y.o. female [id number] at 39w2d admitted for IOL 2/2 NRNST in the office   - GBS negative, No indication for GBS prophylaxis   - VSS, afebrile   - cEFM/TOCO   - S/p nubain x1   - Patient comfortable with epidural in place   - AROM performed, clear fluid noted at [address] per protocol    GDMA2              - Patient currently on Novolog 0/0/6 with meals and NPH 22U qHS              - Insulin held currently, clear liquid diet ordered              - Plan to check POCT q4h now    Attending updated and in agreement with plan    Cipriano Urbina DO  Ob/Gyn Resident  1/18/2021, 7:42 PM

## 2021-01-19 NOTE — PROGRESS NOTES
CLINICAL PHARMACY NOTE: MEDS TO [address] Select Patient?: Yes  Total # of Prescriptions Filled: 3   The following medications were delivered to the patient:  · Ibuprofen  · Ferrous sulfate  · Dok  ·   Total # of Interventions Completed: 0  Time Spent (min): 45    Additional Documentation:

## 2021-01-19 NOTE — PROGRESS NOTES
POST PARTUM DAY # 1    Dasia Diaz is a 47 y.o. female  This patient was seen & examined today.  21    Her pregnancy was complicated by:   Patient Active Problem List   Diagnosis    Insulin controlled gestational diabetes mellitus (GDM) in third trimester    Marijuana use    Hx GDMA1 (G4)    Multigravida of advanced maternal age in third trimester    Heartburn during pregnancy in second trimester    Need for Tdap vaccination    Rh negative state in antepartum period, third trimester    Elevated BP x1    High-risk pregnancy in third trimester    39 weeks gestation of pregnancy    Dilated fetal bowel (NIPT wnl)    Coxsackie + Antibody serologies    Parvovirus exposure (IgG/IgM + serologies)    FHx CHD (previous child with ASD)     21 M Apg 8/9 Wt 7#10       Today she is doing well without any chief complaint. Her lochia is light. She denies chest pain, shortness of breath, headache, lightheadedness and blurred vision. She is breast feeding and she denies any breast tenderness. She is ambulating well. Her voiding pattern is normal. I reviewed signs and symptoms of post partum depression with the patient, she currently denies any of these symptoms. She is tolerating solids.      Vital Signs:  Vitals:    21 2203 21 2219 21 2234 21 0006   BP: 130/77 127/78 127/79 130/81   Pulse: 105 88 95 100   Resp: 16 16 14 14   Temp:   98.9 °F (37.2 °C) 99.4 °F (37.4 °C)   TempSrc:   Oral Oral   SpO2:       Weight:       Height:            Physical Exam:  General:  no apparent distress, alert and cooperative  Neurologic:  alert, oriented, normal speech, no focal findings or movement disorder noted  Lungs:  No increased work of breathing, good air exchange, clear to auscultation bilaterally, no crackles or wheezing  Heart:  Normal apical impulse, regular rate and rhythm, normal S1 and S2, no S3 or S4, and no murmur noted    Abdomen: abdomen soft, non-distended, non-tender Fundus: non-tender, firm, below umbilicus  Extremities:  no calf tenderness, non edematous    Lab:  Lab Results   Component Value Date    HGB 10.4 (L) 2021     Lab Results   Component Value Date    HCT 31.0 (L) 2021       Assessment/Plan:  1. Mckenna Fulton is a [id number] PPD # 1 s/p    - Doing well, VSS, afebrile   - Male infant in [address], circumcision desired   - Encourage ambulation   - Postpartum CBC ordered this AM   - Motrin/Tylenol for pain control  2. Rh negative/Rubella equivocal   - Rhogam workup ordered   - MMR ordered prior to d/c  3. Breast feeding    - Denies s/s mastitis  4. GDMA2   - Patient previously on Novolog 0/0/6U with meals and NPH 22U qHS, currently held   - NPO for FBS in the AM, will check 2hr postprandial blood sugars tomorrow   - Blood sugars overall well controlled in pregnancy  5. Anemia   - Hgb on admission of 10.4   - Postpartum CBC ordered this AM   - Bleeding and vitals stable, denies orthostatic s/s  6. THC Use   - UDS positive on admission   - Encourage cessation   - SW consult PP  7. Hx Tobacco Use   - Encourage continued cessation  8. BMI 32  9. Continue post partum care    Counseling Completed:  Secondary Smoke risks and Sudden Infant Death Syndrome were reviewed with recommendations. Infant sleeping, \"back to sleep\" and avoidance of co-sleeping recommendations were reviewed. Signs and Symptoms of Post Partum Depression were reviewed. The patient is to call if any occur. Signs and symptoms of Mastitis were reviewed. The patient is to call if any occur for follow up.   Discharge instructions including pelvic rest, no driving with pain medicine and office follow-up were reviewed with patient     Attending Physician: Dr. Boogie Rosenthal, DO  Ob/Gyn Resident   2021, 12:40 AM I have discussed the care of Elizabeth Junior, including pertinent history and exam findings, with the resident. I have seen and examined the patient and the key elements of all parts of the encounter have been performed by me. I agree with the assessment, plan and orders as documented by the resident.     Duran Hawkins MD  Attending Physician

## 2021-01-19 NOTE — PLAN OF CARE
Problem: Discharge Planning:  Goal: Discharged to appropriate level of care  Outcome: Ongoing     Problem: Constipation:  Goal: Bowel elimination is within specified parameters  Outcome: Ongoing     Problem: Fluid Volume - Imbalance:  Goal: Absence of imbalanced fluid volume signs and symptoms  Outcome: Ongoing  Goal: Absence of postpartum hemorrhage signs and symptoms  Outcome: Ongoing     Problem: Infection - Risk of, Puerperal Infection:  Goal: Will show no infection signs and symptoms  Outcome: Ongoing     Problem: Mood - Altered:  Goal: Mood stable  Outcome: Ongoing

## 2021-01-19 NOTE — CARE COORDINATION
Referral received for THC+ in pregnancy. Review of labs for 1/18/2021 revealed positive for cannabinoid. Met with pt regarding concerns. Pt lives with her  and her 3 other children. She denied currently using marijuana stating the last time she used was \"months ago\". She did share they were around others who were smoking but she did not partake. Pt denied any other substance use/abuse. Pt denied any needs for support system or provisions for baby. Pt notified of notification to [facility]; no questions or concerns noted. Writer contacted Giovanna Martinez at [facility] and report made.

## 2021-01-19 NOTE — PLAN OF CARE
Problem: Discharge Planning:  Goal: Discharged to appropriate level of care  Description: Discharged to appropriate level of care  1/18/2021 2216 by Belgica López RN  Outcome: Ongoing  1/18/2021 1353 by Jeannie Valderrama RN  Outcome: Ongoing     Problem: Constipation:  Goal: Bowel elimination is within specified parameters  Description: Bowel elimination is within specified parameters  1/18/2021 2216 by Belgica López RN  Outcome: Ongoing  1/18/2021 1353 by Jeannie Valderrama RN  Outcome: Ongoing     Problem: Fluid Volume - Imbalance:  Goal: Absence of imbalanced fluid volume signs and symptoms  Description: Absence of imbalanced fluid volume signs and symptoms  1/18/2021 2216 by Belgica López RN  Outcome: Ongoing  1/18/2021 1353 by Jeannie Valderrama RN  Outcome: Ongoing  Goal: Absence of postpartum hemorrhage signs and symptoms  Description: Absence of postpartum hemorrhage signs and symptoms  1/18/2021 2216 by Belgica López RN  Outcome: Ongoing  1/18/2021 1353 by Jeannie Valderrama RN  Outcome: Ongoing     Problem: Infection - Risk of, Puerperal Infection:  Goal: Will show no infection signs and symptoms  Description: Will show no infection signs and symptoms  1/18/2021 2216 by Belgica López RN  Outcome: Ongoing  1/18/2021 1353 by Jeannie Valderrama RN  Outcome: Ongoing     Problem: Mood - Altered:  Goal: Mood stable  Description: Mood stable  1/18/2021 2216 by Belgica López RN  Outcome: Ongoing  1/18/2021 1353 by Jeannie Valderrama RN  Outcome: Ongoing     Problem: Pain - Acute:  Goal: Pain level will decrease  Description: Pain level will decrease  1/18/2021 2216 by Belgica López RN  Outcome: Ongoing  1/18/2021 1353 by Jeannie Valderrama RN  Outcome: Ongoing     Problem: Pain:  Goal: Pain level will decrease  Description: Pain level will decrease  1/18/2021 2216 by Belgica López RN  Outcome: Ongoing  1/18/2021 1353 by Jeannie Valderrama RN Outcome: Ongoing  Goal: Control of acute pain  Description: Control of acute pain  Outcome: Ongoing  Goal: Control of chronic pain  Description: Control of chronic pain  Outcome: Ongoing

## 2021-01-19 NOTE — L&D DELIVERY NOTE
Mother's Information    Labor Events     labor?: No  Rupture type: Artificial=AROM, Intact  Fluid color: Clear  Fluid odor: None     Mother Delivery Information    Episiotomy: None  Lacerations: None  Repair Suture: None  Surgical or Additional Est. Blood Loss (mL): 0 (View Only): Edit in Flowsheets   Combined Est. Blood Loss (mL): 0        Romain Armas [[id number]]    Labor Events     labor?: No   steroids?: None  Cervical ripening date/time:     Antibiotics received during labor?: No  Rupture date/time:     Rupture type: Artificial=AROM, Intact  Fluid color: Clear  Fluid odor: None  Induction: Oxytocin  Indications for induction: Diabetes  Labor complications: None          Labor Event Times    Labor onset date/time: 21   Dilation complete date/time:  21 EST   Start pushin2021   Decision time (emergent ):        Anesthesia    Method: Epidural     Assisted Delivery Details    Forceps attempted?: No  Vacuum extractor attempted?: No     Document Additional Attempt       Document Additional Attempt             Shoulder Dystocia    Shoulder dystocia present?: No  Add Second Maneuver  Add Third Maneuver  Add Fourth Maneuver  Add Fifth Maneuver  Add Sixth Maneuver  Add Seventh Maneuver  Add Eighth Maneuver  Add Ninth Maneuver      Presentation    Presentation: Vertex     [place] Information    Head delivery date/time: 2021 20:35:00   Changing the 's delivery date/time could affect patient care.:    Delivery date/time:  21   Delivery type: Vaginal, Spontaneous    Details:        Delivery Providers    Delivering clinician: Jaky Segovia MD   Provider Role    Aidan Neumann RN Allison Hatch, PAMELA Thomas       Cord    Vessels: 3 Vessels  Complications: Nuchal  Nuchal intervention: reduced  Cord around: head  Delayed cord clamping?: Yes Cord clamped date/time: 2021  Cord blood disposition: Lab  Gases sent?: Yes  Stem cell collection (by provider): No     Placenta    Date/time: 2021 20:38:00  Removal: Spontaneous  Appearance: Intact  Disposition: Pathology     Delivery Resuscitation    Method: Bulb Suction, Stimulation     Apgars    Living status: Living  Apgars   1 Minute:  5 Minute:  10 Minute 15 Minute 20 Minute   Skin Color: 0  1       Heart Rate: 2  2       Reflex Irritability: 2  2       Muscle Tone: 2  2       Respiratory Effort: 2  2       Total: 8  9               Apgars Assigned By: Jey Rodriguez     Skin to Skin    Skin to skin initiation date/time: 21 20:35:00   Skin to skin with:  Mother  Skin to skin end date/time:         Measurements    Weight: 3459 g Length: 50.8 cm      Delivery Information    Episiotomy: None  Perineal lacerations: None    Vaginal laceration: No    Cervical laceration: No    Surgical or additional est. blood loss (mL): 0 (View Only): Edit in Flowsheets   Combined est. blood loss (mL): 0  Repair suture: None     Vaginal Delivery Counts    Initial count personnel: HANNAH  Initial count verified by: CHITRA   4x4:  Needles:  Instruments:  Lap Pads:  Sponges:    Initial counts:         Final counts:         Final count personnel: HANNAH  Final count verified by: Karoline Mcnamara  Accurate final count?: Yes  Final vaginal sweep completed: Yes     Other Procedures    Procedures: None            Vaginal Delivery Note  Department of Obstetrics and Gynecology  [facility]       Patient: Lexus Morales   : 1980  MRN: [id number]   Date of delivery: 21     Pre-operative Diagnosis: Lexus Morales  at 39w2d, IUP     IOL 2/2 NRNST in the office    GDMA2    Anemia (10.4)    AMA (NIPT wnl)    Dilated fetal bowel (fetal echo wnl)    Coxsackie + Ab/Parvo IgG/IgM+    Hx Polyhydramnios (rslvd)    Hx GDMA1 (G4)    FHx CHD (previous child with ASD)    THC use (UDS+)    Former smoker    BMI 32 Post-operative Diagnosis:  Living  male infant, same as above    Delivering Obstetrician & Assistant(s): Dr. Sharmila Calderon; Juanis Ruiz, PGY2    Infant Information:   Information for the patient's :  Payal Porter [[id number]]        Information for the patient's :  Payal Porter [[id number]]        Weight: 7lb 10oz  Apgar scores: 8 at 1 minute and 9 at 5 minutes. Anesthesia:  epidural anesthesia    Application and Delivery:    She was known to be GBS negative. The patient progressed well, received an epidural, became complete and felt the urge to push. After pushing with contractions the fetal head delivered Cephalic, left occiput anterior over an intact perineum, nuchal cord was present and reduced. The anterior, then posterior shoulder delivered easily and atraumatically followed by the rest of the infant. Nose and mouth suctioned with bulb suction, infant was stimulated and dried. Cord was clamped and cut after one minute delayed cord clamping and infant was placed on maternal abdomen, and attended by RN for evaluation. The delivery of the placenta was spontaneous and appeared intact and that the umbilical cord had three vessels noted. Pitocin was started. The vagina was swept of all clots and debris. The perineum and vagina were evaluated and no laceration was found. Mother and baby tolerated procedure well. Dr. Parveen Olson was present for entire delivery.     Delivery Summary:  Labor & Delivery Summary  Dilation Complete Date: 21  Dilation Complete Time:     Specimen: placenta sent to pathology, cord blood and cord gases  Quantitative blood loss:  200ml immediately following delivery  Condition:  infant stable to general nursery and mother stable  Counts: instrument and sponge counts correct  Blood Type and Rh: AB NEGATIVE    Rubella Immunity Status: equivocal  Infant Feeding: breast feeding    Wilian Hauser DO  Ob/Gyn Resident  2021, 8:55 PM

## 2021-01-19 NOTE — PLAN OF CARE
Problem: Constipation:  Goal: Bowel elimination is within specified parameters  Description: Bowel elimination is within specified parameters  1/19/2021 1806 by Odalys Headley RN  Outcome: Met This Shift     Problem: Fluid Volume - Imbalance:  Goal: Absence of imbalanced fluid volume signs and symptoms  Description: Absence of imbalanced fluid volume signs and symptoms  1/19/2021 1806 by Odalys Headley RN  Outcome: Met This Shift     Problem: Fluid Volume - Imbalance:  Goal: Absence of postpartum hemorrhage signs and symptoms  Description: Absence of postpartum hemorrhage signs and symptoms  1/19/2021 1806 by Odalys Headley RN  Outcome: Met This Shift     Problem: Infection - Risk of, Puerperal Infection:  Goal: Will show no infection signs and symptoms  Description: Will show no infection signs and symptoms  1/19/2021 1806 by Odalys Headley RN  Outcome: Met This Shift     Problem: Mood - Altered:  Goal: Mood stable  Description: Mood stable  1/19/2021 1806 by Odalys Headley RN  Outcome: Met This Shift     Problem: Pain - Acute:  Goal: Pain level will decrease  Description: Pain level will decrease  1/19/2021 1806 by Odalys Headley RN  Outcome: Met This Shift     Problem: Pain:  Goal: Pain level will decrease  Description: Pain level will decrease  1/19/2021 1806 by Odalys Headley RN  Outcome: Met This Shift     Problem: Pain:  Goal: Control of acute pain  Description: Control of acute pain  1/19/2021 1806 by Odalys Headley RN  Outcome: Met This Shift     Problem: Discharge Planning:  Goal: Discharged to appropriate level of care  Description: Discharged to appropriate level of care  1/19/2021 1806 by Odalys Headley RN  Outcome: Ongoing     Problem: Pain:  Goal: Control of chronic pain  Description: Control of chronic pain  1/19/2021 1806 by Odalys Headley RN  Outcome: Completed

## 2021-01-20 VITALS
BODY MASS INDEX: 32.76 KG/M2 | OXYGEN SATURATION: 94 % | WEIGHT: 178 LBS | HEART RATE: 82 BPM | TEMPERATURE: 97.2 F | DIASTOLIC BLOOD PRESSURE: 70 MMHG | SYSTOLIC BLOOD PRESSURE: 112 MMHG | HEIGHT: 62 IN | RESPIRATION RATE: 16 BRPM

## 2021-01-20 PROCEDURE — 6370000000 HC RX 637 (ALT 250 FOR IP): Performed by: STUDENT IN AN ORGANIZED HEALTH CARE EDUCATION/TRAINING PROGRAM

## 2021-01-20 RX ADMIN — ACETAMINOPHEN 1000 MG: 500 TABLET, FILM COATED ORAL at 04:07

## 2021-01-20 RX ADMIN — IBUPROFEN 800 MG: 800 TABLET ORAL at 03:05

## 2021-01-20 ASSESSMENT — PAIN DESCRIPTION - PAIN TYPE: TYPE: ACUTE PAIN

## 2021-01-20 ASSESSMENT — PAIN DESCRIPTION - LOCATION: LOCATION: ABDOMEN

## 2021-01-20 ASSESSMENT — PAIN DESCRIPTION - DESCRIPTORS: DESCRIPTORS: CRAMPING

## 2021-01-20 ASSESSMENT — PAIN DESCRIPTION - ORIENTATION: ORIENTATION: LOWER;MID

## 2021-01-20 ASSESSMENT — PAIN DESCRIPTION - PROGRESSION: CLINICAL_PROGRESSION: GRADUALLY WORSENING

## 2021-01-20 ASSESSMENT — PAIN SCALES - GENERAL
PAINLEVEL_OUTOF10: 3
PAINLEVEL_OUTOF10: 5

## 2021-01-20 NOTE — FLOWSHEET NOTE
Discharge instructions given per order, patient signs and states understanding. Copies given. Rx filled yesterday by meds to beds per patient request. Gift bag given. Patient to schedule follow up visit with Dr. Emely Gomez as instructed.

## 2021-01-20 NOTE — PLAN OF CARE
Problem: Discharge Planning:  Goal: Discharged to appropriate level of care  Description: Discharged to appropriate level of care  1/20/2021 0724 by Thelma Álvarez RN  Outcome: Completed  1/20/2021 0415 by Yumiko Soares RN  Outcome: Ongoing  1/19/2021 1806 by Abbi Mansfield RN  Outcome: Ongoing     Problem: Constipation:  Goal: Bowel elimination is within specified parameters  Description: Bowel elimination is within specified parameters  1/20/2021 0724 by Thelma Álvarez RN  Outcome: Completed  1/20/2021 0415 by Yumiko Soares RN  Outcome: Ongoing  1/19/2021 1806 by Abbi Mansfield RN  Outcome: Met This Shift     Problem: Fluid Volume - Imbalance:  Goal: Absence of imbalanced fluid volume signs and symptoms  Description: Absence of imbalanced fluid volume signs and symptoms  1/20/2021 0724 by Thelma Álvarez RN  Outcome: Completed  1/20/2021 0415 by Yumiko Soares RN  Outcome: Ongoing  1/19/2021 1806 by Abbi Mansfield RN  Outcome: Met This Shift  Goal: Absence of postpartum hemorrhage signs and symptoms  Description: Absence of postpartum hemorrhage signs and symptoms  1/20/2021 0724 by Thelma Álvarez RN  Outcome: Completed  1/20/2021 0415 by Yumiko Soares RN  Outcome: Ongoing  1/19/2021 1806 by Abbi Mansfield RN  Outcome: Met This Shift     Problem: Infection - Risk of, Puerperal Infection:  Goal: Will show no infection signs and symptoms  Description: Will show no infection signs and symptoms  1/20/2021 0724 by Thelma Álvarez RN  Outcome: Completed  1/20/2021 0415 by Yumiko Soares RN  Outcome: Ongoing  1/19/2021 1806 by Abbi Mansfield RN  Outcome: Met This Shift     Problem: Mood - Altered:  Goal: Mood stable  Description: Mood stable  1/20/2021 0724 by Thelma Álvarez RN  Outcome: Completed  1/20/2021 0415 by Yumiko Soares RN  Outcome: Ongoing  1/19/2021 1806 by Abbi Mansfield RN  Outcome: Met This Shift     Problem: Pain - Acute:  Goal: Pain level will decrease Description: Pain level will decrease  1/20/2021 0724 by Erick Baker RN  Outcome: Completed  1/20/2021 0415 by Walker Sapp RN  Outcome: Ongoing  1/19/2021 1806 by Anabel Loera RN  Outcome: Met This Shift     Problem: Pain:  Goal: Pain level will decrease  Description: Pain level will decrease  1/20/2021 0724 by Erick Baker RN  Outcome: Completed  1/20/2021 0415 by Walker Sapp RN  Outcome: Ongoing  1/19/2021 1806 by Anabel Loera RN  Outcome: Met This Shift  Goal: Control of acute pain  Description: Control of acute pain  1/20/2021 0724 by Erick Baker RN  Outcome: Completed  1/20/2021 0415 by Walker aSpp RN  Outcome: Ongoing  1/19/2021 1806 by Anabel Loera RN  Outcome: Met This Shift  Goal: Control of chronic pain  Description: Control of chronic pain  1/19/2021 1806 by Anabel Loera RN  Outcome: Completed

## 2021-01-20 NOTE — LACTATION NOTE
This note was copied from a baby's chart. Lactation round made. Mother reports breastfeeding is going well. Shekhar Lovett is nursing currently. Mother denies painful latch. Mother reports right nipple is sore and believes it is related to first nurse which was over 40 minutes long. Mother denies redness or cracked nipple. Lanolin cream and Comfort Hydrogel Pads given. Writer reviews clogged ducts and mastitis. Mother verbalizes understanding. Bilirubin reviewed, 4.5, low risk. Weight loss WNL. Voiding and stooling as expected for age. Parents state green in color. Mother encouraged to call out for breastfeeding assistance over night and to follow up with [facility] at [phone].

## 2021-01-20 NOTE — PLAN OF CARE
Problem: Discharge Planning:  Goal: Discharged to appropriate level of care  Description: Discharged to appropriate level of care  1/20/2021 0415 by Jan Yousif RN  Outcome: Ongoing  1/19/2021 1806 by Ann David RN  Outcome: Ongoing  9/14/5629 0524 by Radha Shrestha RN  Outcome: Ongoing     Problem: Constipation:  Goal: Bowel elimination is within specified parameters  Description: Bowel elimination is within specified parameters  1/20/2021 0415 by Jan Yousif RN  Outcome: Ongoing  1/19/2021 1806 by Ann David RN  Outcome: Met This Shift  7/36/7228 4230 by Radha Shrestha RN  Outcome: Ongoing     Problem: Fluid Volume - Imbalance:  Goal: Absence of imbalanced fluid volume signs and symptoms  Description: Absence of imbalanced fluid volume signs and symptoms  1/20/2021 0415 by Jan Yousif RN  Outcome: Ongoing  1/19/2021 1806 by Ann David RN  Outcome: Met This Shift  3/56/3846 8583 by Radha Shrestha RN  Outcome: Ongoing  Goal: Absence of postpartum hemorrhage signs and symptoms  Description: Absence of postpartum hemorrhage signs and symptoms  1/20/2021 0415 by Jan Yousif RN  Outcome: Ongoing  1/19/2021 1806 by Ann David RN  Outcome: Met This Shift  1/66/1842 9277 by Radha Shrestha RN  Outcome: Ongoing     Problem: Infection - Risk of, Puerperal Infection:  Goal: Will show no infection signs and symptoms  Description: Will show no infection signs and symptoms  1/20/2021 0415 by Jan Yousif RN  Outcome: Ongoing  1/19/2021 1806 by Ann David RN  Outcome: Met This Shift  5/65/0581 8837 by Radha Shrestha RN  Outcome: Ongoing     Problem: Mood - Altered:  Goal: Mood stable  Description: Mood stable  1/20/2021 0415 by Jan Yousif RN  Outcome: Ongoing  1/19/2021 1806 by Ann David RN  Outcome: Met This Shift  4/05/2437 9321 by Radha Shrestha RN  Outcome: Ongoing     Problem: Pain - Acute:  Goal: Pain level will decrease Description: Pain level will decrease  1/20/2021 0415 by Mert Jenkins RN  Outcome: Ongoing  1/19/2021 1806 by Reji Leal RN  Outcome: Met This Shift  9/15/5209 4392 by Lord Sanchez RN  Outcome: Ongoing     Problem: Pain:  Description: Pain management should include both nonpharmacologic and pharmacologic interventions.   Goal: Pain level will decrease  Description: Pain level will decrease  1/20/2021 0415 by Mert Jenkins RN  Outcome: Ongoing  1/19/2021 1806 by Reji Leal RN  Outcome: Met This Shift  7/73/4931 4152 by Lord Sanchez RN  Outcome: Ongoing  Goal: Control of acute pain  Description: Control of acute pain  1/20/2021 0415 by Mert Jenkins RN  Outcome: Ongoing  1/19/2021 1806 by Reji Leal RN  Outcome: Met This Shift  3/45/9850 9714 by Lord Sanchez RN  Outcome: Ongoing

## 2021-01-21 ENCOUNTER — TELEPHONE (OUTPATIENT)
Dept: INFECTIOUS DISEASES | Age: 41
End: 2021-01-21

## 2021-01-21 LAB — SURGICAL PATHOLOGY REPORT: NORMAL

## 2021-01-22 LAB
BLD PROD TYP BPU: NORMAL
DISPENSE STATUS BLOOD BANK: NORMAL
TRANSFUSION STATUS: NORMAL
UNIT DIVISION: 0
UNIT NUMBER: NORMAL

## 2021-02-02 ENCOUNTER — POSTPARTUM VISIT (OUTPATIENT)
Dept: OBGYN CLINIC | Age: 41
End: 2021-02-02

## 2021-02-02 VITALS
HEART RATE: 76 BPM | DIASTOLIC BLOOD PRESSURE: 78 MMHG | HEIGHT: 62 IN | BODY MASS INDEX: 29.63 KG/M2 | SYSTOLIC BLOOD PRESSURE: 124 MMHG | WEIGHT: 161 LBS

## 2021-02-02 PROCEDURE — 99024 POSTOP FOLLOW-UP VISIT: CPT | Performed by: SPECIALIST

## 2021-02-02 RX ORDER — FLUOXETINE HYDROCHLORIDE 40 MG/1
40 CAPSULE ORAL DAILY
Qty: 30 CAPSULE | Refills: 3 | Status: SHIPPED | OUTPATIENT
Start: 2021-02-02 | End: 2022-05-14

## 2021-02-02 ASSESSMENT — ENCOUNTER SYMPTOMS
EYE PAIN: 0
APNEA: 0
CONSTIPATION: 0
ABDOMINAL DISTENTION: 0
ABDOMINAL PAIN: 0
COUGH: 0
VOMITING: 0
DIARRHEA: 0
NAUSEA: 0

## 2021-02-02 NOTE — PROGRESS NOTES
Subjective:      Patient ID: Arnold Ferrari is a 47 y.o. female. Chief Complaint   Patient presents with    Postpartum Care     /78 (Site: Left Upper Arm, Position: Sitting, Cuff Size: Medium Adult)   Pulse 76   Ht 5' 2\" (1.575 m)   Wt 161 lb (73 kg)   LMP 04/14/2020 (Approximate)   Breastfeeding Yes   BMI 29.45 kg/m²   Patient's last menstrual period was 04/14/2020 (approximate). [id number]    Past Medical History:   Diagnosis Date    Diabetes mellitus ([facility] [address] 2017    gestational diabetes     Postpartum depression 2/2/2021    Rh incompatibility     Rh sensitized      Current Outpatient Medications Ordered in Epic   Medication Sig Dispense Refill    FLUoxetine (PROZAC) 40 MG capsule Take 1 capsule by mouth daily 30 capsule 3    ferrous sulfate (FE TABS) 325 (65 Fe) MG EC tablet Take 1 tablet by mouth 2 times daily 60 tablet 3    ibuprofen (ADVIL;MOTRIN) 800 MG tablet Take 1 tablet by mouth every 8 hours as needed for Pain 30 tablet 0    docusate sodium (COLACE) 100 MG capsule Take 1 capsule by mouth 2 times daily as needed for Constipation 60 capsule 1    Prenatal Vit-Fe Fumarate-FA (PNV PRENATAL PLUS MULTIVITAMIN) 27-1 MG TABS Take 1 tablet by mouth daily (Patient not taking: Reported on 2/2/2021) 30 tablet 11     No current [facility]-ordered facility-administered medications on file. Problem List Items Addressed This Visit     Postpartum depression - Primary    Relevant Medications    FLUoxetine (PROZAC) 40 MG capsule        No Known Allergies  No orders of the defined types were placed in this encounter. Postpartum Visit: Patient is here today for postpartum vaginal delivery. I have fully reviewed the prenatal and intrapartum course. She is 2 weeks postpartum. Patient is breast feeding. Her bleeding is light. Patient's pain is 3 out of 10 on the pain scale. Patient is not sexually active. Current contraception method is none. Postpartum depression screening questionnaire provided to patient and is negative with a score of 9 on the EPDS. Patient states that she is having a lot of stress and anxiety due to a teenager at home and trying to buy a house. Review of Systems   Constitutional: Negative for activity change, appetite change and fever. HENT: Negative for ear discharge and ear pain. Eyes: Negative for pain and visual disturbance. Respiratory: Negative for apnea and cough. Cardiovascular: Negative for chest pain, palpitations and leg swelling. Gastrointestinal: Negative for abdominal distention, abdominal pain, constipation, diarrhea, nausea and vomiting. Endocrine: Negative. Genitourinary: Negative for difficulty urinating, dysuria, menstrual problem and pelvic pain. Musculoskeletal: Negative for neck pain and neck stiffness. Skin: Negative. Neurological: Negative for light-headedness and numbness. Hematological: Negative. Does not bruise/bleed easily. Objective:   Physical Exam  Vitals signs and nursing note reviewed. Constitutional:       Appearance: She is well-developed. HENT:      Head: Normocephalic and atraumatic. Neck:      Musculoskeletal: Normal range of motion and neck supple. Thyroid: No thyromegaly. Cardiovascular:      Rate and Rhythm: Normal rate and regular rhythm. Pulmonary:      Effort: Pulmonary effort is normal.      Breath sounds: Normal breath sounds. No wheezing. Abdominal:      General: Bowel sounds are normal. There is no distension. Palpations: Abdomen is soft. There is no mass. Tenderness: There is no abdominal tenderness. There is no guarding. Genitourinary:     Uterus: Normal.    Musculoskeletal: Normal range of motion. Skin:     General: Skin is dry. Neurological:      Mental Status: She is alert and oriented to person, place, and time.    Psychiatric:         Behavior: Behavior normal. Thought Content: Thought content normal.         Assessment:      Patient 2 weeks post vaginal delivery with postpartum depression. Will treat with Prozac. Patient was told to schedule an appointment in 4 weeks for postpartum exam.      Plan:      Orders Placed This Encounter   Medications    FLUoxetine (PROZAC) 40 MG capsule     Sig: Take 1 capsule by mouth daily     Dispense:  30 capsule     Refill:  3      Appointment in 4 weeks for postpartum exam.    I, Renetta Rogers, am scribing for, and in the presence of Dr. Nathanael Harvey. Electronically signed by: Renetta Rogers 2/2/21 4:36 PM       I agree to the above documentation placed by my scribe Renetta Rogers. I reviewed the scribe's note and agree with the documented findings and plan of care. Any areas of disagreement are noted on the chart. I have personally evaluated this patient. Additional findings are as noted. I agree with the chief complaint, past medical history, past surgical history, allergies, medications, social and family history as documented unless otherwise noted below.      Electronically signed by Nathanael Harvey MD on 2/3/2021 at 1:58 AM

## 2021-02-04 ENCOUNTER — TELEPHONE (OUTPATIENT)
Dept: OBGYN CLINIC | Age: 41
End: 2021-02-04

## 2021-02-04 NOTE — TELEPHONE ENCOUNTER
Patient reports having vaginal bleeding while having a bowel movement. Patient reports baring down to have a bowel movement and \"bright red blood starting pouring out of vagina. \" Patient denies seeing any blood clots and denies any pain. Patient reports she is not currently saturating a lubna pad front to back. Patient instructed to monitor vaginal bleeding and to go to the ED if she saturates a lubna pad front to back every hour times 2 hours. Patient gave verbal understanding.

## 2021-02-18 ENCOUNTER — OFFICE VISIT (OUTPATIENT)
Dept: OBGYN CLINIC | Age: 41
End: 2021-02-18
Payer: MEDICAID

## 2021-02-18 ENCOUNTER — HOSPITAL ENCOUNTER (OUTPATIENT)
Age: 41
Setting detail: SPECIMEN
Discharge: HOME | End: 2021-02-18
Payer: MEDICAID

## 2021-02-18 VITALS
SYSTOLIC BLOOD PRESSURE: 116 MMHG | HEART RATE: 82 BPM | BODY MASS INDEX: 28.52 KG/M2 | DIASTOLIC BLOOD PRESSURE: 82 MMHG | HEIGHT: 62 IN | WEIGHT: 155 LBS

## 2021-02-18 DIAGNOSIS — N76.0 ACUTE VAGINITIS: Primary | ICD-10-CM

## 2021-02-18 DIAGNOSIS — N76.0 ACUTE VAGINITIS: ICD-10-CM

## 2021-02-18 PROBLEM — Z3A.39 39 WEEKS GESTATION OF PREGNANCY: Status: RESOLVED | Noted: 2021-01-18 | Resolved: 2021-02-18

## 2021-02-18 PROBLEM — O26.893 RH NEGATIVE STATE IN ANTEPARTUM PERIOD, THIRD TRIMESTER: Status: RESOLVED | Noted: 2020-11-16 | Resolved: 2021-02-18

## 2021-02-18 PROBLEM — O24.414 INSULIN CONTROLLED GESTATIONAL DIABETES MELLITUS (GDM) IN THIRD TRIMESTER: Status: RESOLVED | Noted: 2017-07-11 | Resolved: 2021-02-18

## 2021-02-18 PROBLEM — O26.892 HEARTBURN DURING PREGNANCY IN SECOND TRIMESTER: Status: RESOLVED | Noted: 2020-09-28 | Resolved: 2021-02-18

## 2021-02-18 PROBLEM — O09.93 HIGH-RISK PREGNANCY IN THIRD TRIMESTER: Status: RESOLVED | Noted: 2020-11-25 | Resolved: 2021-02-18

## 2021-02-18 PROBLEM — R03.0 ELEVATED BP WITHOUT DIAGNOSIS OF HYPERTENSION: Status: RESOLVED | Noted: 2020-11-24 | Resolved: 2021-02-18

## 2021-02-18 PROBLEM — Z67.91 RH NEGATIVE STATE IN ANTEPARTUM PERIOD, THIRD TRIMESTER: Status: RESOLVED | Noted: 2020-11-16 | Resolved: 2021-02-18

## 2021-02-18 PROBLEM — R12 HEARTBURN DURING PREGNANCY IN SECOND TRIMESTER: Status: RESOLVED | Noted: 2020-09-28 | Resolved: 2021-02-18

## 2021-02-18 PROBLEM — O09.523 MULTIGRAVIDA OF ADVANCED MATERNAL AGE IN THIRD TRIMESTER: Status: RESOLVED | Noted: 2020-08-11 | Resolved: 2021-02-18

## 2021-02-18 PROBLEM — Z23 NEED FOR TDAP VACCINATION: Status: RESOLVED | Noted: 2020-10-26 | Resolved: 2021-02-18

## 2021-02-18 PROCEDURE — G8482 FLU IMMUNIZE ORDER/ADMIN: HCPCS | Performed by: CLINICAL NURSE SPECIALIST

## 2021-02-18 PROCEDURE — 1111F DSCHRG MED/CURRENT MED MERGE: CPT | Performed by: CLINICAL NURSE SPECIALIST

## 2021-02-18 PROCEDURE — 1036F TOBACCO NON-USER: CPT | Performed by: CLINICAL NURSE SPECIALIST

## 2021-02-18 PROCEDURE — G8419 CALC BMI OUT NRM PARAM NOF/U: HCPCS | Performed by: CLINICAL NURSE SPECIALIST

## 2021-02-18 PROCEDURE — 99213 OFFICE O/P EST LOW 20 MIN: CPT | Performed by: CLINICAL NURSE SPECIALIST

## 2021-02-18 PROCEDURE — G8427 DOCREV CUR MEDS BY ELIG CLIN: HCPCS | Performed by: CLINICAL NURSE SPECIALIST

## 2021-02-18 RX ORDER — METRONIDAZOLE 500 MG/1
500 TABLET ORAL 2 TIMES DAILY
Qty: 14 TABLET | Refills: 0 | Status: SHIPPED | OUTPATIENT
Start: 2021-02-18 | End: 2021-02-25

## 2021-02-18 RX ORDER — FLUCONAZOLE 100 MG/1
100 TABLET ORAL DAILY
Qty: 7 TABLET | Refills: 0 | Status: SHIPPED | OUTPATIENT
Start: 2021-02-18 | End: 2021-02-25

## 2021-02-18 ASSESSMENT — ENCOUNTER SYMPTOMS
RESPIRATORY NEGATIVE: 1
GASTROINTESTINAL NEGATIVE: 1
ALLERGIC/IMMUNOLOGIC NEGATIVE: 1
EYES NEGATIVE: 1

## 2021-02-18 NOTE — PROGRESS NOTES
Subjective:      Patient ID:  Amanda Carter is a 47 y.o. female who presents for   Chief Complaint   Patient presents with    Vaginal Discharge     Pt 5 weeks post partum  with yellow/green vag discharge x5 days       HPI     Patient is a 46 yo female who presents 5 weeks postpartum and reports that she has been having a yellow discharge and now has a green hue to the discharge with odor for the past 5 days. Review of Systems   Constitutional: Negative for chills and fever. HENT: Negative. Eyes: Negative. Respiratory: Negative. Cardiovascular: Negative. Gastrointestinal: Negative. Endocrine: Negative. Genitourinary: Positive for vaginal discharge (yellow to green discharge with odor for approx. 5 days). Negative for dysuria and menstrual problem. Musculoskeletal: Negative. Skin: Negative. Allergic/Immunologic: Negative. Neurological: Negative. Hematological: Negative. Psychiatric/Behavioral: Negative. /82 (Site: Left Upper Arm, Position: Sitting, Cuff Size: Medium Adult)   Pulse 82   Ht 5' 2\" (1.575 m)   Wt 155 lb (70.3 kg)   LMP 2020 (Approximate)   Breastfeeding Yes   BMI 28.35 kg/m²    Patient's last menstrual period was 2020 (approximate).     Family History   Problem Relation Age of Onset    Cancer Maternal Grandfather       Past Medical History:   Diagnosis Date    Diabetes mellitus ([facility] [address] 2017    gestational diabetes     Postpartum depression 2021    Rh incompatibility     Rh sensitized       Past Surgical History:   Procedure Laterality Date    LEG SURGERY N/A     WISDOM TOOTH EXTRACTION        Social History     Socioeconomic History    Marital status:      Spouse name: None    Number of children: None    Years of education: None    Highest education level: None   Occupational History    None   Social Needs    Financial resource strain: None    Food insecurity     Worry: None     Inability: None  Transportation needs     Medical: None     Non-medical: None   Tobacco Use    Smoking status: Former Smoker    Smokeless tobacco: Never Used   Substance and Sexual Activity    Alcohol use: Yes     Comment: Rarely    Drug use: No     Comment: last used in December 2016    Sexual activity: Yes     Partners: Male   Lifestyle    Physical activity     Days per week: None     Minutes per session: None    Stress: None   Relationships    Social connections     Talks on phone: None     Gets together: None     Attends [employer] service: None     Active member of club or organization: None     Attends meetings of clubs or organizations: None     Relationship status: None    Intimate partner violence     Fear of current or ex partner: None     Emotionally abused: None     Physically abused: None     Forced sexual activity: None   Other Topics Concern    None   Social History Narrative    None      Current Outpatient Medications   Medication Sig Dispense Refill    metroNIDAZOLE (FLAGYL) 500 MG tablet Take 1 tablet by mouth 2 times daily for 7 days 14 tablet 0    fluconazole (DIFLUCAN) 100 MG tablet Take 1 tablet by mouth daily for 7 days 7 tablet 0    FLUoxetine (PROZAC) 40 MG capsule Take 1 capsule by mouth daily 30 capsule 3    ferrous sulfate (FE TABS) 325 (65 Fe) MG EC tablet Take 1 tablet by mouth 2 times daily 60 tablet 3    ibuprofen (ADVIL;MOTRIN) 800 MG tablet Take 1 tablet by mouth every 8 hours as needed for Pain 30 tablet 0     No current facility-administered medications for this visit. Objective:   Physical Exam  Vitals signs reviewed. Constitutional:       Appearance: She is well-developed. HENT:      Head: Normocephalic and atraumatic. Eyes:      Conjunctiva/sclera: Conjunctivae normal.   Neck:      Musculoskeletal: Normal range of motion and neck supple. Cardiovascular:      Rate and Rhythm: Normal rate and regular rhythm.    Pulmonary:      Effort: Pulmonary effort is normal. Breath sounds: Normal breath sounds. Abdominal:      General: Bowel sounds are normal.   Genitourinary:     Vagina: Vaginal discharge (scant yellow discharge) present. Musculoskeletal: Normal range of motion. Skin:     General: Skin is warm and dry. Neurological:      Mental Status: She is oriented to person, place, and time. Psychiatric:         Behavior: Behavior normal.         Thought Content: Thought content normal.         Judgment: Judgment normal.         Assessment:      Diagnosis Orders   1. Acute vaginitis  VAGINITIS DNA PROBE    metroNIDAZOLE (FLAGYL) 500 MG tablet    fluconazole (DIFLUCAN) 100 MG tablet           Plan:      Return for as needed. Patient was seen with total face to face time of 20 minutes. More than 50% of this visit was on counseling andeducation regarding the problems listed below and her options. She was also counseled on her preventative health maintenance recommendations and follow-up.     Electronically signed by: Ramu Sheikh CNP

## 2021-02-19 LAB
DIRECT EXAM: ABNORMAL
Lab: ABNORMAL
SPECIMEN DESCRIPTION: ABNORMAL

## 2021-03-02 ENCOUNTER — POSTPARTUM VISIT (OUTPATIENT)
Dept: OBGYN CLINIC | Age: 41
End: 2021-03-02
Payer: MEDICAID

## 2021-03-02 VITALS
WEIGHT: 157 LBS | BODY MASS INDEX: 28.89 KG/M2 | DIASTOLIC BLOOD PRESSURE: 74 MMHG | HEIGHT: 62 IN | TEMPERATURE: 97.9 F | SYSTOLIC BLOOD PRESSURE: 110 MMHG

## 2021-03-02 DIAGNOSIS — Z86.32 HISTORY OF GESTATIONAL DIABETES: ICD-10-CM

## 2021-03-02 DIAGNOSIS — N76.0 ACUTE VAGINITIS: ICD-10-CM

## 2021-03-02 RX ORDER — FLUCONAZOLE 100 MG/1
100 TABLET ORAL DAILY
Qty: 7 TABLET | Refills: 0 | Status: SHIPPED | OUTPATIENT
Start: 2021-03-02 | End: 2021-03-09

## 2021-03-02 RX ORDER — METRONIDAZOLE 500 MG/1
500 TABLET ORAL 2 TIMES DAILY
Qty: 14 TABLET | Refills: 0 | Status: SHIPPED | OUTPATIENT
Start: 2021-03-02 | End: 2021-03-09

## 2021-03-02 ASSESSMENT — ENCOUNTER SYMPTOMS
ABDOMINAL DISTENTION: 0
DIARRHEA: 0
VOMITING: 0
APNEA: 0
NAUSEA: 0
COUGH: 0
EYE PAIN: 0
CONSTIPATION: 0
ABDOMINAL PAIN: 0

## 2021-03-02 NOTE — PROGRESS NOTES
Subjective:      Patient ID: Fer Lux is a 47 y.o. female. Chief Complaint   Patient presents with    Postpartum Care     6 week follow up     /74 (Site: Left Upper Arm, Position: Sitting, Cuff Size: Medium Adult)   Temp 97.9 °F (36.6 °C) (Temporal)   Ht 5' 2\" (1.575 m)   Wt 157 lb (71.2 kg)   LMP 04/14/2020 (Approximate)   BMI 28.72 kg/m²   Patient's last menstrual period was 04/14/2020 (approximate). [id number]    Past Medical History:   Diagnosis Date    Diabetes mellitus ([facility] [address] 2017    gestational diabetes     Postpartum depression 2/2/2021    Rh incompatibility     Rh sensitized      Current Outpatient Medications Ordered in Epic   Medication Sig Dispense Refill    metroNIDAZOLE (FLAGYL) 500 MG tablet Take 1 tablet by mouth 2 times daily for 7 days 14 tablet 0    fluconazole (DIFLUCAN) 100 MG tablet Take 1 tablet by mouth daily for 7 days 7 tablet 0    FLUoxetine (PROZAC) 40 MG capsule Take 1 capsule by mouth daily 30 capsule 3    ibuprofen (ADVIL;MOTRIN) 800 MG tablet Take 1 tablet by mouth every 8 hours as needed for Pain 30 tablet 0     No current Epic-ordered facility-administered medications on file. Problem List Items Addressed This Visit     None      Visit Diagnoses     Routine postpartum follow-up    -  Primary    Relevant Orders    Glucose Tolerance, 2 Hrs    Acute vaginitis        Relevant Orders    Glucose Tolerance, 2 Hrs    History of gestational diabetes        Relevant Orders    Glucose Tolerance, 2 Hrs        No Known Allergies  Orders Placed This Encounter   Procedures    Glucose Tolerance, 2 Hrs     75 grams of glucola     Standing Status:   Future     Standing Expiration Date:   3/2/2022        Postpartum Visit: Patient is here today for postpartum vaginal delivery. I have fully reviewed the prenatal and intrapartum course. She is 6 weeks postpartum. Patient is breast feeding.  Postpartum depression screening questionnaire provided to patient and is negative Patient 6 weeks postpartum with vaginitis, otherwise normal exam.  Will treat with Flagyl and Diflucan. Patient was advised that she may resume normal activity. Due to her history of gestational diabetes, she was told to have 2 hour 75 gram glucose testing done. Patient requesting permanent tubal sterilization. Explained to patient that as a [facility] physician, I am no longer able to perform this procedure. Plan:      Orders Placed This Encounter   Procedures    Glucose Tolerance, 2 Hrs     Orders Placed This Encounter   Medications    metroNIDAZOLE (FLAGYL) 500 MG tablet     Sig: Take 1 tablet by mouth 2 times daily for 7 days     Dispense:  14 tablet     Refill:  0    fluconazole (DIFLUCAN) 100 MG tablet     Sig: Take 1 tablet by mouth daily for 7 days     Dispense:  7 tablet     Refill:  0      Appointment for annual exam.    I, Jasmin Laurent, am scribing for, and in the presence of Dr. Nnea Araujo. Electronically signed by: Jasmin Laurent 3/2/21 3:12 PM       I agree to the above documentation placed by my scribe Jasmin Laurent. I reviewed the scribe's note and agree with the documented findings and plan of care. Any areas of disagreement are noted on the chart. I have personally evaluated this patient. Additional findings are as noted. I agree with the chief complaint, past medical history, past surgical history, allergies, medications, social and family history as documented unless otherwise noted below.      Electronically signed by Nena Araujo MD on 3/3/2021 at 3:51 AM

## 2021-03-03 PROBLEM — N76.0 ACUTE VAGINITIS: Status: ACTIVE | Noted: 2021-03-03

## 2021-08-29 ENCOUNTER — APPOINTMENT (OUTPATIENT)
Dept: GENERAL RADIOLOGY | Age: 41
End: 2021-08-29
Payer: MEDICAID

## 2021-08-29 ENCOUNTER — HOSPITAL ENCOUNTER (EMERGENCY)
Age: 41
Discharge: HOME | End: 2021-08-29
Attending: EMERGENCY MEDICINE
Payer: MEDICAID

## 2021-08-29 VITALS
DIASTOLIC BLOOD PRESSURE: 89 MMHG | RESPIRATION RATE: 14 BRPM | HEART RATE: 78 BPM | TEMPERATURE: 98.8 F | SYSTOLIC BLOOD PRESSURE: 121 MMHG | BODY MASS INDEX: 29.08 KG/M2 | WEIGHT: 158 LBS | OXYGEN SATURATION: 99 % | HEIGHT: 62 IN

## 2021-08-29 DIAGNOSIS — S93.601A SPRAIN OF RIGHT FOOT, INITIAL ENCOUNTER: Primary | ICD-10-CM

## 2021-08-29 PROCEDURE — 73610 X-RAY EXAM OF ANKLE: CPT

## 2021-08-29 PROCEDURE — 73630 X-RAY EXAM OF FOOT: CPT

## 2021-08-29 PROCEDURE — 99284 EMERGENCY DEPT VISIT MOD MDM: CPT

## 2021-08-29 ASSESSMENT — PAIN DESCRIPTION - DESCRIPTORS: DESCRIPTORS: THROBBING

## 2021-08-29 ASSESSMENT — PAIN SCALES - GENERAL: PAINLEVEL_OUTOF10: 4

## 2021-08-29 ASSESSMENT — PAIN DESCRIPTION - PAIN TYPE: TYPE: ACUTE PAIN

## 2021-08-29 ASSESSMENT — PAIN DESCRIPTION - LOCATION: LOCATION: ANKLE

## 2021-08-29 ASSESSMENT — PAIN DESCRIPTION - ORIENTATION: ORIENTATION: RIGHT

## 2021-08-29 NOTE — ED PROVIDER NOTES
[address] [facility] ED  EMERGENCY DEPARTMENT ENCOUNTER      Pt Name: Gino Luis  MRN: [id number]  [place] 1980  Date of evaluation: 8/29/2021  Provider: Charbel José MD    CHIEF COMPLAINT     Chief Complaint   Patient presents with    Ankle Pain     right ankle/foot         HISTORY OF PRESENT ILLNESS   (Location/Symptom, Timing/Onset, Context/Setting,Quality, Duration, Modifying Factors, Severity)  Note limiting factors. Gino Luis is a 47 y.o. female who presents to the emergency department stating she tripped over her child's walker and fell last night injuring the right ankle. She reports pain over the dorsal lateral aspect of the right foot and right ankle with painful weightbearing reported. No other area of injury is reported. The history is provided by the patient. Nursing Notes werereviewed. REVIEW OF SYSTEMS    (2-9 systems for level 4, 10 or more for level 5)     Review of Systems   All other systems reviewed and are negative. Except as noted above the remainder of the review of systems was reviewed and negative. PAST MEDICAL HISTORY     Past Medical History:   Diagnosis Date    Diabetes mellitus ([facility] [address] 2017    gestational diabetes     Postpartum depression 2/2/2021    Rh incompatibility     Rh sensitized          SURGICALHISTORY       Past Surgical History:   Procedure Laterality Date    LEG SURGERY N/A     WISDOM TOOTH EXTRACTION           CURRENT MEDICATIONS       Discharge Medication List as of 8/29/2021  3:38 PM      CONTINUE these medications which have NOT CHANGED    Details   FLUoxetine (PROZAC) 40 MG capsule Take 1 capsule by mouth daily, Disp-30 capsule, R-3Normal      ibuprofen (ADVIL;MOTRIN) 800 MG tablet Take 1 tablet by mouth every 8 hours as needed for Pain, Disp-30 tablet, R-0Print             ALLERGIES     Patient has no known allergies.     FAMILY HISTORY       Family History   Problem Relation Age of Onset    Cancer Maternal Grandfather SOCIAL HISTORY       Social History     Socioeconomic History    Marital status:      Spouse name: None    Number of children: None    Years of education: None    Highest education level: None   Occupational History    None   Tobacco Use    Smoking status: Former Smoker    Smokeless tobacco: Never Used   Vaping Use    Vaping Use: Former    Substances: Occasionally   Substance and Sexual Activity    Alcohol use: Yes     Comment: Rarely    Drug use: No     Comment: last used in December 2016    Sexual activity: Yes     Partners: Male   Other Topics Concern    None   Social History Narrative    None     Social Determinants of Health     Financial Resource Strain:     Difficulty of Paying Living Expenses:    Food Insecurity:     Worried About Running Out of Food in the Last Year:     Ran Out of Food in the Last Year:    Transportation Needs:     Lack of Transportation (Medical):  Lack of Transportation (Non-Medical):    Physical Activity:     Days of Exercise per Week:     Minutes of Exercise per Session:    Stress:     Feeling of Stress :    Social Connections:     Frequency of Communication with Friends and Family:     Frequency of Social Gatherings with Friends and Family:     Attends Mu-ism Services:     Active Member of Clubs or Organizations:     Attends Club or Organization Meetings:     Marital Status:    Intimate Partner Violence:     Fear of Current or Ex-Partner:     Emotionally Abused:     Physically Abused:     Sexually Abused:        SCREENINGS             PHYSICAL EXAM    (up to 7 for level 4, 8 or more for level 5)     ED Triage Vitals [08/29/21 1427]   BP Temp Temp Source Pulse Resp SpO2 Height Weight   121/89 98.8 °F (37.1 °C) Oral 78 14 99 % 5' 2\" (1.575 m) 158 lb (71.7 kg)       Physical Exam  Vitals reviewed. Constitutional:       General: She is not in acute distress.   Musculoskeletal:      Comments: Patient localizes tenderness over the bifurcated ligament region over the right foot and the proximal right fifth metatarsal.  She is not focally tender over the medial or lateral malleoli or associated ligaments. Pulses and sensation are intact. Skin:     General: Skin is warm and dry. Neurological:      Mental Status: She is alert. DIAGNOSTIC RESULTS     EKG: All EKG's are interpreted by the Emergency Department Physician who either signs orCo-signs this chart in the absence of a cardiologist.    RADIOLOGY:     Interpretation per the Radiologist below, ifavailable at the time of this note:    XR ANKLE RIGHT (MIN 3 VIEWS)   Preliminary Result   No evidence of acute fracture. XR FOOT RIGHT (MIN 3 VIEWS)   Preliminary Result   No evidence of acute fracture. ED BEDSIDE ULTRASOUND:   Performed by ED Physician - none    LABS:  Labs Reviewed - No data to display    All other labs were within normal range ornot returned as of this dictation. EMERGENCY DEPARTMENT COURSE and DIFFERENTIAL DIAGNOSIS/MDM:   Vitals:    Vitals:    08/29/21 1427   BP: 121/89   Pulse: 78   Resp: 14   Temp: 98.8 °F (37.1 °C)   TempSrc: Oral   SpO2: 99%   Weight: 71.7 kg (158 lb)   Height: 5' 2\" (1.575 m)            X-rays are negative. There is no appreciable significant swelling either. Ace wrap is applied for compression comfort. At patient's request she was provided crutches for non or partial weight bearing ambulation. She may take ibuprofen for pain and is discharged in stable condition. MDM    CONSULTS:  None    PROCEDURES:  Unlessotherwise noted below, none     Procedures    FINAL IMPRESSION      1.  Sprain of right foot, initial encounter          DISPOSITION/PLAN   DISPOSITION Decision To Discharge 08/29/2021 03:36:09 PM      PATIENT REFERRED TO:  Your physician            DISCHARGE MEDICATIONS:         Problem List:  Patient Active Problem List   Diagnosis Code    Marijuana use F12.90    History of gestational diabetes Z80.34    Routine postpartum follow-up Z39.2    Dilated fetal bowel (NIPT wnl) O28.3    Coxsackie + Antibody serologies B34.1    Parvovirus exposure (IgG/IgM + serologies) Z20.828    FHx CHD (previous child with ASD) Z82.79     23 M Apg 8/9 Wt 7#10 O80    Postpartum depression O99.345, F53.0    Acute vaginitis N76.0           Summation      Patient Course: Discharged. ED Medicationsadministered this visit:  Medications - No data to display    New Prescriptions from this visit:    Discharge Medication List as of 2021  3:38 PM          Follow-up:  Your physician              Final Impression:   1.  Sprain of right foot, initial encounter               (Please note that portions of this note were completed with a voice recognitionprogram.  Efforts were made to edit the dictations but occasionally words are mis-transcribed.)    Susana King MD (electronically signed)  Attending Emergency Physician            Susana King MD  [phone]

## 2022-05-14 ENCOUNTER — HOSPITAL ENCOUNTER (EMERGENCY)
Age: 42
Discharge: HOME | End: 2022-05-14
Attending: EMERGENCY MEDICINE
Payer: MEDICAID

## 2022-05-14 ENCOUNTER — APPOINTMENT (OUTPATIENT)
Dept: GENERAL RADIOLOGY | Age: 42
End: 2022-05-14
Payer: MEDICAID

## 2022-05-14 VITALS
SYSTOLIC BLOOD PRESSURE: 132 MMHG | BODY MASS INDEX: 29.44 KG/M2 | WEIGHT: 160 LBS | RESPIRATION RATE: 16 BRPM | DIASTOLIC BLOOD PRESSURE: 92 MMHG | TEMPERATURE: 98 F | OXYGEN SATURATION: 98 % | HEIGHT: 62 IN | HEART RATE: 91 BPM

## 2022-05-14 DIAGNOSIS — S93.601A SPRAIN OF RIGHT FOOT, INITIAL ENCOUNTER: Primary | ICD-10-CM

## 2022-05-14 PROCEDURE — 73630 X-RAY EXAM OF FOOT: CPT

## 2022-05-14 PROCEDURE — 99283 EMERGENCY DEPT VISIT LOW MDM: CPT

## 2022-05-14 ASSESSMENT — PAIN - FUNCTIONAL ASSESSMENT: PAIN_FUNCTIONAL_ASSESSMENT: 0-10

## 2022-05-14 ASSESSMENT — PAIN DESCRIPTION - ORIENTATION: ORIENTATION: RIGHT

## 2022-05-14 ASSESSMENT — PAIN SCALES - GENERAL: PAINLEVEL_OUTOF10: 8

## 2022-05-14 ASSESSMENT — PAIN DESCRIPTION - LOCATION: LOCATION: FOOT

## 2022-05-15 NOTE — ED PROVIDER NOTES
[address] [facility] ED  [address] [facility] [address] [facility] [address]  Phone: [phone]  Fax: [phone] [facility]          Pt Name: Ayush Hirsch  MRN: [id number]  [place] 1980  Date of evaluation: 5/14/2022      CHIEF COMPLAINT       Chief Complaint   Patient presents with    Foot Injury     right lateral foot        HISTORY OF PRESENT ILLNESS       Ayush Hirsch is a 46 y.o. female who presents with right foot pain at the fifth metatarsal region. States she stepped in a hole that she was unaware of and twisted her foot. No ankle pain. Nothing otherwise makes it better or worse. Took 4 ibuprofen 200s about an hour prior to arrival.  No head injury. No fall or neck or back injury. No other symptoms, injuries or concerns. REVIEW OF SYSTEMS       Review of Systems   All other systems reviewed and are negative. PAST MEDICAL HISTORY    has a past medical history of Diabetes mellitus ([address] Postpartum depression, Rh incompatibility, and Rh sensitized. SURGICAL HISTORY      has a past surgical history that includes Leg Surgery (N/A); [place] tooth extraction; and Tubal ligation. CURRENT MEDICATIONS       Discharge Medication List as of 5/14/2022  9:26 PM      CONTINUE these medications which have NOT CHANGED    Details   ibuprofen (ADVIL;MOTRIN) 800 MG tablet Take 1 tablet by mouth every 8 hours as needed for Pain, Disp-30 tablet, R-0Print             ALLERGIES     has No Known Allergies. FAMILY HISTORY     She indicated that the status of her maternal grandfather is unknown.     family history includes Cancer in her maternal grandfather. SOCIAL HISTORY      reports that she has quit smoking. Her smoking use included e-cigarettes. She uses smokeless tobacco. She reports current alcohol use. She reports current drug use. Drug: Marijuana [place]). PHYSICAL EXAM     INITIAL VITALS:  height is 5' 2\" (1.575 m) and weight is 72.6 kg (160 lb).  Her oral encounter. Vitals:    Vitals:    05/14/22 2029   BP: (!) 132/92   Pulse: 91   Resp: 16   Temp: 98 °F (36.7 °C)   TempSrc: Oral   SpO2: 98%   Weight: 72.6 kg (160 lb)   Height: 5' 2\" (1.575 m)     -------------------------  BP: (!) 132/92, Temp: 98 °F (36.7 °C), Pulse: 91, Resp: 16      Re-evaluation Notes    Patient doing well on reevaluation. Imaging negative for acute findings. We will put in a boot and she already has her own crutches. Advised follow-up with sports medicine for possible physical therapy if needed. Advised to return right away if worsening or for new or concerning symptoms. She is comfortable with this plan. I feel over-the-counter NSAIDs are appropriate and she is comfortable with this. The patient presented with foot pain. A fracture was not detected on X-ray. The ankle and knee joints were not affected. No skin lesions were seen. There are no signs of compartment syndrome. The pulses are 2/4. The motor is 5/5. The sensation is intact. The patient was advised to return to the Emergency Department for increasing pain, numbness, weakness, or coldness to the extremity. The patient was further instructed to follow up in 2-3 days with their family doctor or orthopedics. The patient voiced understanding of these instructions. The patient understands that at this time there is no evidence for a more malignant underlying process, but the patient also understands that early in the process of an illness or injury, an emergency department workup can be falsely reassuring. Routine discharge counseling was given, and the patient understands that worsening, changing or persistent symptoms should prompt an immediate call or follow up with their primary physician or return to the emergency department. The importance of appropriate follow up was also discussed. I have reviewed the disposition diagnosis with the patient and or their family/guardian.   I have answered their questions and given discharge instructions. They voiced understanding of these instructions and did not have any further questions or complaints. CONSULTS:    None    CRITICAL CARE:     None    PROCEDURES:    None    FINAL IMPRESSION      1.  Sprain of right foot, initial encounter          DISPOSITION/PLAN   DISPOSITION Decision To Discharge 05/14/2022 09:11:09 PM      Condition on Disposition    Improved    PATIENT REFERRED TO:  Andres Wells DO  [address] [address]  [phone]    Schedule an appointment as soon as possible for a visit in 3 days        DISCHARGE MEDICATIONS:  Discharge Medication List as of 5/14/2022  9:26 PM          (Please note that portions of this note were completed with a voice recognition program.  Efforts were made to edit the dictations but occasionally words are mis-transcribed.)    Eliana Rojo DO, DO  Attending Emergency Physician       Eliana Rojo DO  05/15/22 0141

## 2022-05-15 NOTE — ED NOTES
Pt to ER with  per wheelchair, transferred self to bed. Pt reports right lateral foot pain, reports there was a hole in the blacktop that she stepped in, resulting in a fall. Pt denies hitting head, denies LOC. Pt rates pain 8/10, reports taking motrin 800 mg at 1900.  Pt calm, cooperative, respers even non labored, skin warm pink, right foot with small area of swelling, skin intact, no obvious deformities, no distress, here for eval.       Irlanda Field RN  05/14/22 2051

## 2022-05-16 ENCOUNTER — OFFICE VISIT (OUTPATIENT)
Dept: ORTHOPEDIC SURGERY | Age: 42
End: 2022-05-16
Payer: MEDICAID

## 2022-05-16 DIAGNOSIS — S93.601A RIGHT FOOT SPRAIN, INITIAL ENCOUNTER: Primary | ICD-10-CM

## 2022-05-16 PROCEDURE — G8427 DOCREV CUR MEDS BY ELIG CLIN: HCPCS | Performed by: FAMILY MEDICINE

## 2022-05-16 PROCEDURE — G8419 CALC BMI OUT NRM PARAM NOF/U: HCPCS | Performed by: FAMILY MEDICINE

## 2022-05-16 PROCEDURE — 99203 OFFICE O/P NEW LOW 30 MIN: CPT | Performed by: FAMILY MEDICINE

## 2022-05-16 PROCEDURE — 4004F PT TOBACCO SCREEN RCVD TLK: CPT | Performed by: FAMILY MEDICINE

## 2022-05-16 NOTE — PROGRESS NOTES
Sports Medicine Consultation      CHIEF COMPLAINT:  Foot Pain (Right. 2 days ago. fell in a hole at a storage unit)  . HPI:   The patient is a 46 y.o. female who is being seen in  new patient being seen for regarding new problem  right foot/ankle pain. The patient states the pain has been present for 2 days. As far as trauma to the area, the patient indicates fell in a hole at a storage unit. There is  pain with weight bearing. The patient states numbness and tingling is  present. Catching and locking has not been present. She has tried boot, ice, ibu slight relief. she has a past medical history of Diabetes mellitus ([facility] [address] Postpartum depression, Rh incompatibility, and Rh sensitized. she has a past surgical history that includes Leg Surgery (N/A); [place] tooth extraction; and Tubal ligation. family history includes Cancer in her maternal grandfather. Social History     Socioeconomic History    Marital status: Single     Spouse name: Not on file    Number of children: Not on file    Years of education: Not on file    Highest education level: Not on file   Occupational History    Not on file   Tobacco Use    Smoking status: Former Smoker     Types: E-Cigarettes    Smokeless tobacco: Current User   Vaping Use    Vaping Use: Former    Substances: Occasionally   Substance and Sexual Activity    Alcohol use: Yes     Comment: Rarely    Drug use: Yes     Types: Marijuana [place])    Sexual activity: Yes     Partners: Male   Other Topics Concern    Not on file   Social History Narrative    Not on file     Social Determinants of Health     Financial Resource Strain:     Difficulty of Paying Living Expenses: Not on file   Food Insecurity:     Worried About Running Out of Food in the Last Year: Not on file    Kassi of Food in the Last Year: Not on file   Transportation Needs:     Lack of Transportation (Medical): Not on file    Lack of Transportation (Non-Medical):  Not on file   Physical Activity:     Days of Exercise per Week: Not on file    Minutes of Exercise per Session: Not on file   Stress:     Feeling of Stress : Not on file   Social Connections:     Frequency of Communication with Friends and Family: Not on file    Frequency of Social Gatherings with Friends and Family: Not on file    Attends Mandaen Services: Not on file    Active Member of [address] [place] [address] or Organizations: Not on file    Attends Club or Organization Meetings: Not on file    Marital Status: Not on file   Intimate Partner Violence:     Fear of Current or Ex-Partner: Not on file    Emotionally Abused: Not on file    Physically Abused: Not on file    Sexually Abused: Not on file   Housing Stability:     Unable to Pay for Housing in the Last Year: Not on file    Number of [place] in the Last Year: Not on file    Unstable Housing in the Last Year: Not on file       Current Outpatient Medications   Medication Sig Dispense Refill    ibuprofen (ADVIL;MOTRIN) 800 MG tablet Take 1 tablet by mouth every 8 hours as needed for Pain 30 tablet 0     No current facility-administered medications for this visit. Allergies:  shehas No Known Allergies. ROS:  CV:  Denies chest pain; palpitations; shortness of breath; swelling of feet, ankles; and loss of consciousness. CON: Denies fever and dizziness. ENT:  Denies hearing loss / ringing, ear infections hoarseness, and swallowing problems. RESP:  Denies chronic cough, spitting up blood, and asthma/wheezing. GI: Denies abdominal pain, change in bowel habits, nausea or vomiting, and blood in stools. :  Denies frequent urination, burning or painful urination, blood in the urine, and bladder incontinence. NEURO:  Denies headache, memory loss, sleep disturbance, and tremor or movement disorder.     11 system review of systems is otherwise negative unless noted in HPI    PHYSICAL EXAM:   LMP 04/30/2022   GENERAL: Elo Edwards is a 46 y.o. female who is alert and oriented and sitting comfortably in our office. SKIN:  Intact without rashes, lesions or ulcerations. No obvious deformity or swelling. NEURO: Musculoskeletal and axillary nerves intact to sensory and motor testing. EYES:  Extraocular muscles intact. MOUTH: Oral mucosa moist.  No perioral lesions. PULM:  Respirations unlabored and regular. VASC:  Capillary refill less than 3 seconds. Distal pulses are palpable. There is no lymphadenopathy. Ankle Exam:    Reveals there is not effusion. Swelling is  present. Edema is  present. Ecchymoses is  present. Palpation-Tenderness cuboid, 5th mt  The foot is in functional planus alignment. ROM:  40 degrees plantarflexion and 20 degrees dorsiflexion. Subtalar motion is 30 degrees inversion and 20 degrees eversion. Strength-WNL  Sensation-normal to light touch  Special Tests-Ankle inversion: laxity negative  Ankle eversion: laxity negative  Ankle drawer: laxity negative  External rotation:negative  Syndesmotic Squeeze test: negative  Gait: Antalgic    PSYCH:  Patient has good fund of knowledge and displays understanding of exam.    RADIOLOGY: XR FOOT RIGHT (MIN 3 VIEWS)    Result Date: 5/14/2022  No fracture or dislocation. IMPRESSION:     1. Right foot sprain, initial encounter        PLAN:   We discussed some of the etiologies and natural histories of     ICD-10-CM    1. Right foot sprain, initial encounter  S93.601A     We discussed the various treatment alternatives including anti-inflammatory medications, physical therapy, injections, further imaging studies and as a last resort surgery.   At this point it appears the patient has a midfoot sprain of that right foot I do think the boot is the most appropriate treatment she may weight-bear as tolerated in the boot and work in the boot at the same time I did advise her that she is unable to drive with the boot on as that is unsafe we will see her back in 2 weeks for reevaluation recommend continued ice and elevation. Patient voiced understanding agreement this plan. No follow-ups on file. Please be aware portions of this note were completed using voice recognition software and unforeseen errors may have occurred    Electronically signed by Hugo Love DO, FAOASM  on 5/16/22 at 1:36 PM EDT    No orders of the defined types were placed in this encounter.

## 2022-06-03 ENCOUNTER — OFFICE VISIT (OUTPATIENT)
Dept: ORTHOPEDIC SURGERY | Age: 42
End: 2022-06-03
Payer: MEDICAID

## 2022-06-03 VITALS — DIASTOLIC BLOOD PRESSURE: 87 MMHG | HEART RATE: 90 BPM | SYSTOLIC BLOOD PRESSURE: 115 MMHG

## 2022-06-03 DIAGNOSIS — S93.601A RIGHT FOOT SPRAIN, INITIAL ENCOUNTER: Primary | ICD-10-CM

## 2022-06-03 PROCEDURE — G8427 DOCREV CUR MEDS BY ELIG CLIN: HCPCS | Performed by: FAMILY MEDICINE

## 2022-06-03 PROCEDURE — G8419 CALC BMI OUT NRM PARAM NOF/U: HCPCS | Performed by: FAMILY MEDICINE

## 2022-06-03 PROCEDURE — 4004F PT TOBACCO SCREEN RCVD TLK: CPT | Performed by: FAMILY MEDICINE

## 2022-06-03 PROCEDURE — 99213 OFFICE O/P EST LOW 20 MIN: CPT | Performed by: FAMILY MEDICINE

## 2022-06-03 NOTE — PROGRESS NOTES
Sports Medicine Consultation      CHIEF COMPLAINT:  Foot Pain (Right f/u. 80% better in boot. still some lateral pain and pain going anterior ankle)  . HPI:   The patient is a 46 y.o. female who is being seen in   established patient being seen for regarding follow up of a pre-existing problem  right foot/ankle pain. The patient states the pain has been present for 2 weeks. As far as trauma to the area, the patient indicates no new trauma. There occ pain with weight bearing. The patient states numbness and tingling is  present. Catching and locking has not been present. She has tried boot with relief. she has a past medical history of Diabetes mellitus ([address] Postpartum depression, Rh incompatibility, and Rh sensitized. she has a past surgical history that includes Leg Surgery (N/A); [place] tooth extraction; and Tubal ligation. family history includes Cancer in her maternal grandfather. Social History     Socioeconomic History    Marital status: Single     Spouse name: Not on file    Number of children: Not on file    Years of education: Not on file    Highest education level: Not on file   Occupational History    Not on file   Tobacco Use    Smoking status: Former Smoker     Types: E-Cigarettes    Smokeless tobacco: Current User   Vaping Use    Vaping Use: Former    Substances: Occasionally   Substance and Sexual Activity    Alcohol use: Yes     Comment: Rarely    Drug use: Yes     Types: Marijuana [place])    Sexual activity: Yes     Partners: Male   Other Topics Concern    Not on file   Social History Narrative    Not on file     Social Determinants of Health     Financial Resource Strain:     Difficulty of Paying Living Expenses: Not on file   Food Insecurity:     Worried About Running Out of Food in the Last Year: Not on file    Kassi of Food in the Last Year: Not on file   Transportation Needs:     Lack of Transportation (Medical):  Not on file    Lack of Faustino Benitez is a 46 y.o. female who is alert and oriented and sitting comfortably in our office. SKIN:  Intact without rashes, lesions or ulcerations. No obvious deformity or swelling. NEURO: Musculoskeletal and axillary nerves intact to sensory and motor testing. EYES:  Extraocular muscles intact. MOUTH: Oral mucosa moist.  No perioral lesions. PULM:  Respirations unlabored and regular. VASC:  Capillary refill less than 3 seconds. Distal pulses are palpable. There is no lymphadenopathy. Ankle Exam:    Reveals there is not effusion. Swelling is not present. Edema is not present. Ecchymoses is not present. Palpation-Tenderness diffuse pain across MT heads  The foot is in functional planus alignment. ROM:  40 degrees plantarflexion and 20 degrees dorsiflexion. Subtalar motion is 30 degrees inversion and 20 degrees eversion. Strength-WNL  Sensation-normal to light touch  Special Tests-Ankle inversion: laxity negative  Ankle eversion: laxity negative  Ankle drawer: laxity negative  External rotation:negative  Syndesmotic Squeeze test: negative  The patient is not able to single toe raise. Single leg hop test: positive  Gait: Antalgic    PSYCH:  Patient has good fund of knowledge and displays understanding of exam.    RADIOLOGY: XR FOOT RIGHT (MIN 3 VIEWS)    Result Date: 5/14/2022  No fracture or dislocation. IMPRESSION:     1. Right foot sprain, initial encounter        PLAN:   We discussed some of the etiologies and natural histories of     ICD-10-CM    1. Right foot sprain, initial encounter  S93.601A     We discussed the various treatment alternatives including anti-inflammatory medications, physical therapy, injections, further imaging studies and as a last resort surgery.   At this point patient continues to have fairly pronounced limp despite feeling better I do think keeping her in the boot for a few more weeks is appropriate we will do so and have her follow-up with us in 3 to 4 weeks.  She voiced understanding and agreement this plan. No follow-ups on file. Please be aware portions of this note were completed using voice recognition software and unforeseen errors may have occurred    Electronically signed by Theo Ramsey DO, FAOASM  on 6/3/22 at 11:24 AM EDT    No orders of the defined types were placed in this encounter.

## 2022-06-22 ENCOUNTER — OFFICE VISIT (OUTPATIENT)
Dept: ORTHOPEDIC SURGERY | Age: 42
End: 2022-06-22
Payer: MEDICAID

## 2022-06-22 DIAGNOSIS — S93.601A RIGHT FOOT SPRAIN, INITIAL ENCOUNTER: Primary | ICD-10-CM

## 2022-06-22 PROCEDURE — 4004F PT TOBACCO SCREEN RCVD TLK: CPT | Performed by: FAMILY MEDICINE

## 2022-06-22 PROCEDURE — G8419 CALC BMI OUT NRM PARAM NOF/U: HCPCS | Performed by: FAMILY MEDICINE

## 2022-06-22 PROCEDURE — 99213 OFFICE O/P EST LOW 20 MIN: CPT | Performed by: FAMILY MEDICINE

## 2022-06-22 PROCEDURE — G8427 DOCREV CUR MEDS BY ELIG CLIN: HCPCS | Performed by: FAMILY MEDICINE

## 2022-06-22 NOTE — PROGRESS NOTES
Sports Medicine Consultation      CHIEF COMPLAINT:  Foot Pain (Right f/u. 90% better. still lateral pain and some stiffness in ankle. numbness in pinky toe)  . HPI:   The patient is a 46 y.o. female who is being seen in   established patient being seen for regarding follow up of a pre-existing problem  right foot/ankle pain. The patient states the pain has been present for 5 weeks. As far as trauma to the area, the patient indicates no new. There minimal pain with weight bearing. The patient states numbness and tingling is  present. Catching and locking has not been present. She has tried boot with relief. she has a past medical history of Diabetes mellitus ([address] Postpartum depression, Rh incompatibility, and Rh sensitized. she has a past surgical history that includes Leg Surgery (N/A); [place] tooth extraction; and Tubal ligation. family history includes Cancer in her maternal grandfather. Social History     Socioeconomic History    Marital status: Single     Spouse name: Not on file    Number of children: Not on file    Years of education: Not on file    Highest education level: Not on file   Occupational History    Not on file   Tobacco Use    Smoking status: Former Smoker     Types: E-Cigarettes    Smokeless tobacco: Current User   Vaping Use    Vaping Use: Former    Substances: Occasionally   Substance and Sexual Activity    Alcohol use: Yes     Comment: Rarely    Drug use: Yes     Types: Marijuana Myrtis [place])    Sexual activity: Yes     Partners: Male   Other Topics Concern    Not on file   Social History Narrative    Not on file     Social Determinants of Health     Financial Resource Strain:     Difficulty of Paying Living Expenses: Not on file   Food Insecurity:     Worried About Running Out of Food in the Last Year: Not on file    Kassi of Food in the Last Year: Not on file   Transportation Needs:     Lack of Transportation (Medical):  Not on file    Lack of Transportation (Non-Medical): Not on file   Physical Activity:     Days of Exercise per Week: Not on file    Minutes of Exercise per Session: Not on file   Stress:     Feeling of Stress : Not on file   Social Connections:     Frequency of Communication with Friends and Family: Not on file    Frequency of Social Gatherings with Friends and Family: Not on file    Attends Adventism Services: Not on file    Active Member of [address] [place] [address] or Organizations: Not on file    Attends Club or Organization Meetings: Not on file    Marital Status: Not on file   Intimate Partner Violence:     Fear of Current or Ex-Partner: Not on file    Emotionally Abused: Not on file    Physically Abused: Not on file    Sexually Abused: Not on file   Housing Stability:     Unable to Pay for Housing in the Last Year: Not on file    Number of [place] in the Last Year: Not on file    Unstable Housing in the Last Year: Not on file       Current Outpatient Medications   Medication Sig Dispense Refill    ibuprofen (ADVIL;MOTRIN) 800 MG tablet Take 1 tablet by mouth every 8 hours as needed for Pain 30 tablet 0     No current facility-administered medications for this visit. Allergies:  shehas No Known Allergies. ROS:  CV:  Denies chest pain; palpitations; shortness of breath; swelling of feet, ankles; and loss of consciousness. CON: Denies fever and dizziness. ENT:  Denies hearing loss / ringing, ear infections hoarseness, and swallowing problems. RESP:  Denies chronic cough, spitting up blood, and asthma/wheezing. GI: Denies abdominal pain, change in bowel habits, nausea or vomiting, and blood in stools. :  Denies frequent urination, burning or painful urination, blood in the urine, and bladder incontinence. NEURO:  Denies headache, memory loss, sleep disturbance, and tremor or movement disorder.     11 system review of systems is otherwise negative unless noted in HPI    PHYSICAL EXAM:   There were no vitals taken for this visit. GENERAL: Kesha Rangel is a 46 y.o. female who is alert and oriented and sitting comfortably in our office. SKIN:  Intact without rashes, lesions or ulcerations. No obvious deformity or swelling. NEURO: Musculoskeletal and axillary nerves intact to sensory and motor testing. EYES:  Extraocular muscles intact. MOUTH: Oral mucosa moist.  No perioral lesions. PULM:  Respirations unlabored and regular. VASC:  Capillary refill less than 3 seconds. Distal pulses are palpable. There is no lymphadenopathy. Ankle Exam:    Reveals there is not effusion. Swelling is not present. Edema is not present. Ecchymoses is not present. Palpation-Tenderness mid foot  The foot is in normal alignment. ROM:  40 degrees plantarflexion and 20 degrees dorsiflexion. Subtalar motion is 30 degrees inversion and 20 degrees eversion. Strength-WNL  Sensation-normal to light touch  Special Tests-Ankle inversion: laxity negative  Ankle eversion: laxity negative  Ankle drawer: laxity negative  External rotation:negative  Syndesmotic Squeeze test: negative    Gait: in boot    PSYCH:  Patient has good fund of knowledge and displays understanding of exam.    RADIOLOGY: No results found. IMPRESSION:     1. Right foot sprain, initial encounter        PLAN:   We discussed some of the etiologies and natural histories of     ICD-10-CM    1. Right foot sprain, initial encounter  S93.601A Ambulatory referral to Physical Therapy    We discussed the various treatment alternatives including anti-inflammatory medications, physical therapy, injections, further imaging studies and as a last resort surgery. Point patient is doing very well we will transition her into formal physical therapy and the boot and have her follow-up with us as needed. She voiced understanding and agreement this plan    No follow-ups on file.     Please be aware portions of this note were completed using voice recognition software and unforeseen errors may have occurred    Electronically signed by Veronica Quiroz DO, FAOASM  on 6/22/22 at 11:32 AM EDT    No orders of the defined types were placed in this encounter.